# Patient Record
Sex: MALE | Race: BLACK OR AFRICAN AMERICAN | NOT HISPANIC OR LATINO | Employment: OTHER | URBAN - NONMETROPOLITAN AREA
[De-identification: names, ages, dates, MRNs, and addresses within clinical notes are randomized per-mention and may not be internally consistent; named-entity substitution may affect disease eponyms.]

---

## 2018-01-02 ENCOUNTER — APPOINTMENT (OUTPATIENT)
Dept: GENERAL RADIOLOGY | Facility: HOSPITAL | Age: 43
End: 2018-01-02

## 2018-01-02 PROCEDURE — 71046 X-RAY EXAM CHEST 2 VIEWS: CPT

## 2018-06-04 ENCOUNTER — OFFICE VISIT (OUTPATIENT)
Dept: INTERNAL MEDICINE | Facility: CLINIC | Age: 43
End: 2018-06-04

## 2018-06-04 VITALS
WEIGHT: 137.19 LBS | HEART RATE: 56 BPM | OXYGEN SATURATION: 98 % | BODY MASS INDEX: 22.86 KG/M2 | SYSTOLIC BLOOD PRESSURE: 106 MMHG | HEIGHT: 65 IN | TEMPERATURE: 98.5 F | DIASTOLIC BLOOD PRESSURE: 63 MMHG | RESPIRATION RATE: 12 BRPM

## 2018-06-04 DIAGNOSIS — Z00.00 ANNUAL PHYSICAL EXAM: Primary | ICD-10-CM

## 2018-06-04 DIAGNOSIS — R63.8 UNABLE TO MAINTAIN WEIGHT: ICD-10-CM

## 2018-06-04 DIAGNOSIS — R53.83 OTHER FATIGUE: ICD-10-CM

## 2018-06-04 DIAGNOSIS — S46.912A STRAIN OF LEFT SHOULDER, INITIAL ENCOUNTER: ICD-10-CM

## 2018-06-04 DIAGNOSIS — B35.3 TINEA PEDIS OF BOTH FEET: ICD-10-CM

## 2018-06-04 DIAGNOSIS — J45.909 ASTHMA DUE TO ENVIRONMENTAL ALLERGIES: ICD-10-CM

## 2018-06-04 DIAGNOSIS — R45.89 MOODINESS: ICD-10-CM

## 2018-06-04 DIAGNOSIS — F41.9 ANXIETY: ICD-10-CM

## 2018-06-04 DIAGNOSIS — Z23 NEED FOR TDAP VACCINATION: ICD-10-CM

## 2018-06-04 DIAGNOSIS — B35.1 FUNGAL INFECTION OF NAIL: ICD-10-CM

## 2018-06-04 PROBLEM — F12.20 MODERATE TETRAHYDROCANNABINOL (THC) DEPENDENCE (HCC): Status: ACTIVE | Noted: 2018-06-04

## 2018-06-04 PROCEDURE — 90471 IMMUNIZATION ADMIN: CPT | Performed by: NURSE PRACTITIONER

## 2018-06-04 PROCEDURE — 90715 TDAP VACCINE 7 YRS/> IM: CPT | Performed by: NURSE PRACTITIONER

## 2018-06-04 PROCEDURE — 99214 OFFICE O/P EST MOD 30 MIN: CPT | Performed by: NURSE PRACTITIONER

## 2018-06-04 RX ORDER — VENLAFAXINE HYDROCHLORIDE 37.5 MG/1
37.5 CAPSULE, EXTENDED RELEASE ORAL DAILY
Qty: 30 CAPSULE | Refills: 3 | Status: SHIPPED | OUTPATIENT
Start: 2018-06-04 | End: 2018-10-19

## 2018-06-04 RX ORDER — CLOTRIMAZOLE 1 G/ML
SOLUTION TOPICAL EVERY 12 HOURS SCHEDULED
Qty: 30 ML | Refills: 3 | Status: SHIPPED | OUTPATIENT
Start: 2018-06-04 | End: 2018-12-10

## 2018-06-04 RX ORDER — LORATADINE 10 MG/1
10 TABLET ORAL DAILY
Qty: 30 TABLET | Refills: 6 | Status: SHIPPED | OUTPATIENT
Start: 2018-06-04 | End: 2019-06-10

## 2018-06-04 RX ORDER — VENLAFAXINE 50 MG/1
50 TABLET ORAL
Qty: 60 TABLET | Refills: 3 | Status: CANCELLED | OUTPATIENT
Start: 2018-06-04

## 2018-06-04 RX ORDER — MONTELUKAST SODIUM 10 MG/1
10 TABLET ORAL NIGHTLY
Qty: 30 TABLET | Refills: 6 | Status: SHIPPED | OUTPATIENT
Start: 2018-06-04 | End: 2019-06-10 | Stop reason: CLARIF

## 2018-06-04 NOTE — PROGRESS NOTES
CC: left shoulder pain, injury to left should in December.     History:  Jose Pace is a 43 y.o. male who presents today for evaluation of the above problems.    Patient presents today for left shoulder injury.  He states that in December he was weightlifting and felt a pop in the left shoulder chest and back around the shoulder blade.  He did see a chiropractor after the injury.  He reports no imaging was done.  He states back has improved slightly but shoulder remains painful with a ball/firm area over his deltoid muscle.  Range of motion is not limited but tender.  Patient also reports generalized fatigue.  He states that he is an avid weightlifter however he has not been able to gain weight.  He also reports that he has athlete's foot on both feet with yellowing of nails.  Patient also reports that he has issues with anxiety and mood.  He states he has a short fuse and is easy to anger or excite.  No clear of her harming himself or others.  He has tried Wellbutrin in the past however this caused him to be more ford.      ROS:  Review of Systems   Constitutional: Positive for fatigue and unexpected weight change. Negative for activity change, appetite change and fever.   HENT: Negative.    Respiratory: Positive for shortness of breath (after exercise). Negative for cough, chest tightness and wheezing.    Cardiovascular: Negative for chest pain, palpitations and leg swelling.   Gastrointestinal: Negative.    Endocrine: Negative.    Genitourinary: Negative.    Musculoskeletal: Positive for myalgias.   Skin: Positive for rash.   Neurological: Negative for dizziness and headaches.   Psychiatric/Behavioral: Positive for agitation. The patient is nervous/anxious.        No Known Allergies  Past Medical History:   Diagnosis Date   • Asthma      Past Surgical History:   Procedure Laterality Date   • HERNIA REPAIR       No family history on file.   reports that he has never smoked. He has never used smokeless tobacco.  "He reports that he uses drugs, including Marijuana. He reports that he does not drink alcohol.      Current Outpatient Prescriptions:   •  albuterol (PROVENTIL HFA;VENTOLIN HFA) 108 (90 Base) MCG/ACT inhaler, Inhale 2 puffs Every 4 (Four) Hours As Needed for Wheezing., Disp: 1 inhaler, Rfl: 0  •  albuterol (PROVENTIL) (2.5 MG/3ML) 0.083% nebulizer solution, Take 2.5 mg by nebulization Every 4 (Four) Hours As Needed for Wheezing., Disp: 30 vial, Rfl: 0  •  clotrimazole (LOTRIMIN) 1 % external solution, Apply  topically Every 12 (Twelve) Hours., Disp: 30 mL, Rfl: 3  •  loratadine (CLARITIN) 10 MG tablet, Take 1 tablet by mouth Daily., Disp: 30 tablet, Rfl: 6  •  montelukast (SINGULAIR) 10 MG tablet, Take 1 tablet by mouth Every Night., Disp: 30 tablet, Rfl: 6  •  venlafaxine XR (EFFEXOR-XR) 37.5 MG 24 hr capsule, Take 1 capsule by mouth Daily., Disp: 30 capsule, Rfl: 3    OBJECTIVE:  /63 (BP Location: Left arm, Patient Position: Sitting, Cuff Size: Adult)   Pulse 56   Temp 98.5 °F (36.9 °C) (Oral)   Resp 12   Ht 165.1 cm (65\")   Wt 62.2 kg (137 lb 3 oz)   SpO2 98%   BMI 22.83 kg/m²    Physical Exam   Constitutional: He is oriented to person, place, and time. He appears well-developed and well-nourished.   HENT:   Head: Normocephalic and atraumatic.   Eyes: Conjunctivae and EOM are normal. Pupils are equal, round, and reactive to light.   Neck: Normal range of motion. Neck supple.   Cardiovascular: Normal rate, regular rhythm and normal heart sounds.    Pulmonary/Chest: Effort normal and breath sounds normal.   Abdominal: Soft. Bowel sounds are normal.   Musculoskeletal:        Left shoulder: He exhibits tenderness, swelling, deformity (deltoid pain with swelling), pain and spasm.   Neurological: He is alert and oriented to person, place, and time. He has normal reflexes.   Skin: Skin is warm and dry. Rash (athletes foot bilaterally) noted.   Psychiatric: He has a normal mood and affect.   Vitals " reviewed.      Assessment/Plan    Jose was seen today for annual exam.    Diagnoses and all orders for this visit:    Annual physical exam  -     Lipid panel; Future  -     Comprehensive metabolic panel; Future  -     CBC No Differential; Future    Need for Tdap vaccination  -     Tdap Vaccine Greater Than or Equal To 8yo IM    Other fatigue  -     TSH; Future  -     Testosterone; Future  -     Vitamin D 25 hydroxy; Future  -     Vitamin B12; Future    Moodiness  -     Testosterone; Future  -     venlafaxine XR (EFFEXOR-XR) 37.5 MG 24 hr capsule; Take 1 capsule by mouth Daily.    Anxiety  -     venlafaxine XR (EFFEXOR-XR) 37.5 MG 24 hr capsule; Take 1 capsule by mouth Daily.    Strain of left shoulder, initial encounter  -     MRI Shoulder Left Without Contrast; Future    Tinea pedis of both feet  -     clotrimazole (LOTRIMIN) 1 % external solution; Apply  topically Every 12 (Twelve) Hours.    Fungal infection of nail  Will check labs. If lever enzymes normal can do oral terbinafine.     Unable to maintain weight  Labs pending.     Asthma due to environmental allergies  -     loratadine (CLARITIN) 10 MG tablet; Take 1 tablet by mouth Daily.  -     montelukast (SINGULAIR) 10 MG tablet; Take 1 tablet by mouth Every Night.          An After Visit Summary was printed and given to the patient at discharge.  Return in about 6 months (around 12/4/2018) for new patient today was not scheduled as new. May need additional paper work. .         Sanjuanita Canales APRN 6/4/2018

## 2018-06-04 NOTE — PATIENT INSTRUCTIONS
"Other Preventive Recommendations:    · A preventive eye exam performed by an eye specialist is recommended every 1-2 years to screen for glaucoma; cataracts, macular degeneration, and other eye disorders.  · A preventive dental visit is recommended every 6 months.  · Try to get at least 150 minutes of exercise per week or 10,000 steps per day on a pedometer .  · Order or download the FREE \"Exercise & Physical Activity: Your Everyday Guide\" from [employer]. Call 1-655.988.9274 or search [employer] online.  · You need 0926-3839 mg of calcium and 8395-3314 IU of vitamin D per day. It is possible to meet your calcium requirement with diet alone, but a vitamin D supplement is usually necessary to meet this goal.  · When exposed to the sun, use a sunscreen that protects against both UVA and UVB radiation with an SPF of 30 or greater. Reapply every 2 to 3 hours or after sweating, drying off with a towel, or swimming.  · Always wear a seat belt when traveling in a car. Always wear a helmet when riding a bicycle or motorcycle.      Generalized Anxiety Disorder, Adult  Generalized anxiety disorder (ELENI) is a mental health disorder. People with this condition constantly worry about everyday events. Unlike normal anxiety, worry related to ELENI is not triggered by a specific event. These worries also do not fade or get better with time. ELENI interferes with life functions, including relationships, work, and school.  ELENI can vary from mild to severe. People with severe ELENI can have intense waves of anxiety with physical symptoms (panic attacks).  What are the causes?  The exact cause of ELENI is not known.  What increases the risk?  This condition is more likely to develop in:  · Women.  · People who have a family history of anxiety disorders.  · People who are very shy.  · People who experience very stressful life events, such as the death of a loved one.  · People who have a very stressful " family environment.  What are the signs or symptoms?  People with ELENI often worry excessively about many things in their lives, such as their health and family. They may also be overly concerned about:  · Doing well at work.  · Being on time.  · Natural disasters.  · Friendships.  Physical symptoms of ELENI include:  · Fatigue.  · Muscle tension or having muscle twitches.  · Trembling or feeling shaky.  · Being easily startled.  · Feeling like your heart is pounding or racing.  · Feeling out of breath or like you cannot take a deep breath.  · Having trouble falling asleep or staying asleep.  · Sweating.  · Nausea, diarrhea, or irritable bowel syndrome (IBS).  · Headaches.  · Trouble concentrating or remembering facts.  · Restlessness.  · Irritability.  How is this diagnosed?  Your health care provider can diagnose ELENI based on your symptoms and medical history. You will also have a physical exam. The health care provider will ask specific questions about your symptoms, including how severe they are, when they started, and if they come and go. Your health care provider may ask you about your use of alcohol or drugs, including prescription medicines. Your health care provider may refer you to a mental health specialist for further evaluation.  Your health care provider will do a thorough examination and may perform additional tests to rule out other possible causes of your symptoms.  To be diagnosed with ELENI, a person must have anxiety that:  · Is out of his or her control.  · Affects several different aspects of his or her life, such as work and relationships.  · Causes distress that makes him or her unable to take part in normal activities.  · Includes at least three physical symptoms of ELENI, such as restlessness, fatigue, trouble concentrating, irritability, muscle tension, or sleep problems.  Before your health care provider can confirm a diagnosis of ELENI, these symptoms must be present more days than they are not,  and they must last for six months or longer.  How is this treated?  The following therapies are usually used to treat ELENI:  · Medicine. Antidepressant medicine is usually prescribed for long-term daily control. Antianxiety medicines may be added in severe cases, especially when panic attacks occur.  · Talk therapy (psychotherapy). Certain types of talk therapy can be helpful in treating ELENI by providing support, education, and guidance. Options include:  ¨ Cognitive behavioral therapy (CBT). People learn coping skills and techniques to ease their anxiety. They learn to identify unrealistic or negative thoughts and behaviors and to replace them with positive ones.  ¨ Acceptance and commitment therapy (ACT). This treatment teaches people how to be mindful as a way to cope with unwanted thoughts and feelings.  ¨ Biofeedback. This process trains you to manage your body's response (physiological response) through breathing techniques and relaxation methods. You will work with a therapist while machines are used to monitor your physical symptoms.  · Stress management techniques. These include yoga, meditation, and exercise.  A mental health specialist can help determine which treatment is best for you. Some people see improvement with one type of therapy. However, other people require a combination of therapies.  Follow these instructions at home:  · Take over-the-counter and prescription medicines only as told by your health care provider.  · Try to maintain a normal routine.  · Try to anticipate stressful situations and allow extra time to manage them.  · Practice any stress management or self-calming techniques as taught by your health care provider.  · Do not punish yourself for setbacks or for not making progress.  · Try to recognize your accomplishments, even if they are small.  · Keep all follow-up visits as told by your health care provider. This is important.  Contact a health care provider if:  · Your symptoms  do not get better.  · Your symptoms get worse.  · You have signs of depression, such as:  ¨ A persistently sad, cranky, or irritable mood.  ¨ Loss of enjoyment in activities that used to bring you ace.  ¨ Change in weight or eating.  ¨ Changes in sleeping habits.  ¨ Avoiding friends or family members.  ¨ Loss of energy for normal tasks.  ¨ Feelings of guilt or worthlessness.  Get help right away if:  · You have serious thoughts about hurting yourself or others.  If you ever feel like you may hurt yourself or others, or have thoughts about taking your own life, get help right away. You can go to your nearest emergency department or call:  · Your local emergency services (911 in the [place]  · A suicide crisis helpline, such as the National Suicide Prevention Lifeline at 1-922.657.7378. This is open 24 hours a day.  Summary  · Generalized anxiety disorder (ELENI) is a mental health disorder that involves worry that is not triggered by a specific event.  · People with ELENI often worry excessively about many things in their lives, such as their health and family.  · ELENI may cause physical symptoms such as restlessness, trouble concentrating, sleep problems, frequent sweating, nausea, diarrhea, headaches, and trembling or muscle twitching.  · A mental health specialist can help determine which treatment is best for you. Some people see improvement with one type of therapy. However, other people require a combination of therapies.  This information is not intended to replace advice given to you by your health care provider. Make sure you discuss any questions you have with your health care provider.  Document Released: 04/14/2014 Document Revised: 11/07/2017 Document Reviewed: 11/07/2017  [employer] Interactive Patient Education © 2017 [employer]

## 2018-06-05 ENCOUNTER — LAB (OUTPATIENT)
Dept: LAB | Facility: HOSPITAL | Age: 43
End: 2018-06-05

## 2018-06-05 DIAGNOSIS — R53.83 OTHER FATIGUE: ICD-10-CM

## 2018-06-05 DIAGNOSIS — R45.89 MOODINESS: ICD-10-CM

## 2018-06-05 DIAGNOSIS — Z00.00 ANNUAL PHYSICAL EXAM: ICD-10-CM

## 2018-06-05 LAB
25(OH)D3 SERPL-MCNC: 29.7 NG/ML (ref 30–100)
ALBUMIN SERPL-MCNC: 4.3 G/DL (ref 3.5–5)
ALBUMIN/GLOB SERPL: 1.5 G/DL (ref 1.1–2.5)
ALP SERPL-CCNC: 63 U/L (ref 24–120)
ALT SERPL W P-5'-P-CCNC: 35 U/L (ref 0–54)
ANION GAP SERPL CALCULATED.3IONS-SCNC: 9 MMOL/L (ref 4–13)
ARTICHOKE IGE QN: 74 MG/DL (ref 0–99)
AST SERPL-CCNC: 37 U/L (ref 7–45)
BILIRUB SERPL-MCNC: 1.8 MG/DL (ref 0.1–1)
BUN BLD-MCNC: 15 MG/DL (ref 5–21)
BUN/CREAT SERPL: 11 (ref 7–25)
CALCIUM SPEC-SCNC: 9.2 MG/DL (ref 8.4–10.4)
CHLORIDE SERPL-SCNC: 105 MMOL/L (ref 98–110)
CHOLEST SERPL-MCNC: 154 MG/DL (ref 130–200)
CO2 SERPL-SCNC: 30 MMOL/L (ref 24–31)
CREAT BLD-MCNC: 1.36 MG/DL (ref 0.5–1.4)
DEPRECATED RDW RBC AUTO: 37.7 FL (ref 40–54)
ERYTHROCYTE [DISTWIDTH] IN BLOOD BY AUTOMATED COUNT: 11.8 % (ref 12–15)
GFR SERPL CREATININE-BSD FRML MDRD: 70 ML/MIN/1.73
GLOBULIN UR ELPH-MCNC: 2.9 GM/DL
GLUCOSE BLD-MCNC: 101 MG/DL (ref 70–100)
HCT VFR BLD AUTO: 44.9 % (ref 40–52)
HDLC SERPL-MCNC: 60 MG/DL
HGB BLD-MCNC: 14.6 G/DL (ref 14–18)
LDLC/HDLC SERPL: 1.37 {RATIO}
MCH RBC QN AUTO: 28.6 PG (ref 28–32)
MCHC RBC AUTO-ENTMCNC: 32.5 G/DL (ref 33–36)
MCV RBC AUTO: 87.9 FL (ref 82–95)
PLATELET # BLD AUTO: 197 10*3/MM3 (ref 130–400)
PMV BLD AUTO: 10.9 FL (ref 6–12)
POTASSIUM BLD-SCNC: 4.2 MMOL/L (ref 3.5–5.3)
PROT SERPL-MCNC: 7.2 G/DL (ref 6.3–8.7)
RBC # BLD AUTO: 5.11 10*6/MM3 (ref 4.8–5.9)
SODIUM BLD-SCNC: 144 MMOL/L (ref 135–145)
TRIGL SERPL-MCNC: 60 MG/DL (ref 0–149)
TSH SERPL DL<=0.05 MIU/L-ACNC: 1.48 MIU/ML (ref 0.47–4.68)
VIT B12 BLD-MCNC: 595 PG/ML (ref 239–931)
WBC NRBC COR # BLD: 8.45 10*3/MM3 (ref 4.8–10.8)

## 2018-06-05 PROCEDURE — 82306 VITAMIN D 25 HYDROXY: CPT | Performed by: NURSE PRACTITIONER

## 2018-06-05 PROCEDURE — 36415 COLL VENOUS BLD VENIPUNCTURE: CPT

## 2018-06-05 PROCEDURE — 82607 VITAMIN B-12: CPT | Performed by: NURSE PRACTITIONER

## 2018-06-05 PROCEDURE — 84403 ASSAY OF TOTAL TESTOSTERONE: CPT | Performed by: NURSE PRACTITIONER

## 2018-06-05 PROCEDURE — 80061 LIPID PANEL: CPT | Performed by: NURSE PRACTITIONER

## 2018-06-05 PROCEDURE — 80050 GENERAL HEALTH PANEL: CPT | Performed by: NURSE PRACTITIONER

## 2018-06-06 LAB — TESTOST SERPL-MCNC: 547 NG/DL (ref 264–916)

## 2018-06-07 ENCOUNTER — TELEPHONE (OUTPATIENT)
Dept: INTERNAL MEDICINE | Facility: CLINIC | Age: 43
End: 2018-06-07

## 2018-06-07 NOTE — TELEPHONE ENCOUNTER
Patient informed.  Patient asked with his thyroid and testosterone levels being normal, are there any other possible causes for his inability to gain weight?  Patient asked if there are any suggestions for weight gain.

## 2018-06-07 NOTE — PATIENT INSTRUCTIONS
High-Protein and High-Calorie Diet  Eating high-protein and high-calorie foods can help you to gain weight, heal after an injury, and recover after an illness or surgery.  What is my plan?  The specific amount of daily protein and calories you need depends on:  · Your body weight.  · The reason this diet is recommended for you.  Generally, a high-protein, high-calorie diet involves:  · Eating 250-500 extra calories each day.  · Making sure that 10-35% of your daily calories come from protein.  Talk to your health care provider about how much protein and how many calories you need each day. Follow the diet as directed by your health care provider.  What do I need to know about this diet?  · Ask your health care provider if you should take a nutritional supplement.  · Try to eat six small meals each day instead of three large meals.  · Eat a balanced diet, including one food that is high in protein at each meal.  · Keep nutritious snacks handy, such as nuts, trail mixes, dried fruit, and yogurt.  · If you have kidney disease or diabetes, eating too much protein may put extra stress on your kidneys. Talk to your health care provider if you have either of those conditions.  What are some high-protein foods?  Grains   Quinoa. Bulgur wheat.  Vegetables   Soybeans. Peas.  Meats and Other Protein Sources   Beef, pork, and poultry. Fish and seafood. Eggs. Tofu. Textured vegetable protein (TVP). Peanut butter. Nuts and seeds. Dried beans. Protein powders.  Dairy   Whole milk. Whole-milk yogurt. Powdered milk. Cheese. Cottage Cheese. Eggnog.  Beverages   High-protein supplement drinks. Soy milk.  Other   Protein bars.  The items listed above may not be a complete list of recommended foods or beverages. Contact your dietitian for more options.   What are some high-calorie foods?  Grains   Pasta. Quick breads. Muffins. Pancakes. Ready-to-eat cereal.  Vegetables   Vegetables cooked in oil or butter. Fried potatoes.  Fruits    Dried fruit. Fruit leather. Canned fruit in syrup. Fruit juice. Avocados.  Meats and Other Protein Sources   Peanut butter. Nuts and seeds.  Dairy   Heavy cream. Whipped cream. Cream cheese. Sour cream. Ice cream. Custard. Pudding.  Beverages   Meal-replacement beverages. Nutrition shakes. Fruit juice. Sugar-sweetened soft drinks.  Condiments   Salad dressing. Mayonnaise. Ozzy sauce. Fruit preserves or jelly. Honey. Syrup.  Sweets/Desserts   Cake. Cookies. Pie. Pastries. Candy bars. Chocolate.  Fats and Oils   Butter or margarine. Oil. Gravy.  Other   Meal-replacement bars.  The items listed above may not be a complete list of recommended foods or beverages. Contact your dietitian for more options.   What are some tips for including high-protein and high-calorie foods in my diet?  · Add whole milk, half-and-half, or heavy cream to cereal, pudding, soup, or hot cocoa.  · Add whole milk to instant breakfast drinks.  · Add peanut butter to oatmeal or smoothies.  · Add powdered milk to baked goods, smoothies, or milkshakes.  · Add powdered milk, cream, or butter to mashed potatoes.  · Add cheese to cooked vegetables.  · Make whole-milk yogurt parfaits. Top them with granola, fruit, or nuts.  · Add cottage cheese to your fruit.  · Add avocados, cheese, or both to sandwiches or salads.  · Add meat, poultry, or seafood to rice, pasta, casseroles, salads, and soups.  · Use mayonnaise when making egg salad, chicken salad, or tuna salad.  · Use peanut butter as a topping for pretzels, celery, or crackers.  · Add beans to casseroles, dips, and spreads.  · Add pureed beans to sauces and soups.  · Replace calorie-free drinks with calorie-containing drinks, such as milk and fruit juice.  This information is not intended to replace advice given to you by your health care provider. Make sure you discuss any questions you have with your health care provider.  Document Released: 12/18/2006 Document Revised: 05/25/2017 Document  Reviewed: 06/02/2015  [employer] Interactive Patient Education © 2017 [employer]

## 2018-06-07 NOTE — TELEPHONE ENCOUNTER
Diet that increases weight is one way to gain weight. He is considered a health weight at this time.    High-Protein and High-Calorie Diet  Eating high-protein and high-calorie foods can help you to gain weight, heal after an injury, and recover after an illness or surgery.  What is my plan?  The specific amount of daily protein and calories you need depends on:  Your body weight.  The reason this diet is recommended for you.  Generally, a high-protein, high-calorie diet involves:  Eating 250–500 extra calories each day.  Making sure that 10–35% of your daily calories come from protein.  Talk to your health care provider about how much protein and how many calories you need each day. Follow the diet as directed by your health care provider.  What do I need to know about this diet?  Ask your health care provider if you should take a nutritional supplement.  Try to eat six small meals each day instead of three large meals.  Eat a balanced diet, including one food that is high in protein at each meal.  Keep nutritious snacks handy, such as nuts, trail mixes, dried fruit, and yogurt.  If you have kidney disease or diabetes, eating too much protein may put extra stress on your kidneys. Talk to your health care provider if you have either of those conditions.  What are some high-protein foods?  Grains   Quinoa. Bulgur wheat.  Vegetables   Soybeans. Peas.  Meats and Other Protein Sources   Beef, pork, and poultry. Fish and seafood. Eggs. Tofu. Textured vegetable protein (TVP). Peanut butter. Nuts and seeds. Dried beans. Protein powders.  Dairy   Whole milk. Whole-milk yogurt. Powdered milk. Cheese. Cottage Cheese. Eggnog.  Beverages   High-protein supplement drinks. Soy milk.  Other   Protein bars.  The items listed above may not be a complete list of recommended foods or beverages. Contact your dietitian for more options.   What are some high-calorie foods?  Grains   Pasta. Quick breads. Muffins. Pancakes. Ready-to-eat  cereal.  Vegetables   Vegetables cooked in oil or butter. Fried potatoes.  Fruits   Dried fruit. Fruit leather. Canned fruit in syrup. Fruit juice. Avocados.  Meats and Other Protein Sources   Peanut butter. Nuts and seeds.  Dairy   Heavy cream. Whipped cream. Cream cheese. Sour cream. Ice cream. Custard. Pudding.  Beverages   Meal-replacement beverages. Nutrition shakes. Fruit juice. Sugar-sweetened soft drinks.  Condiments   Salad dressing. Mayonnaise. Ozzy sauce. Fruit preserves or jelly. Honey. Syrup.  Sweets/Desserts   Cake. Cookies. Pie. Pastries. Candy bars. Chocolate.  Fats and Oils   Butter or margarine. Oil. Gravy.  Other   Meal-replacement bars.  The items listed above may not be a complete list of recommended foods or beverages. Contact your dietitian for more options.   What are some tips for including high-protein and high-calorie foods in my diet?  Add whole milk, half-and-half, or heavy cream to cereal, pudding, soup, or hot cocoa.  Add whole milk to instant breakfast drinks.  Add peanut butter to oatmeal or smoothies.  Add powdered milk to baked goods, smoothies, or milkshakes.  Add powdered milk, cream, or butter to mashed potatoes.  Add cheese to cooked vegetables.  Make whole-milk yogurt parfaits. Top them with granola, fruit, or nuts.  Add cottage cheese to your fruit.  Add avocados, cheese, or both to sandwiches or salads.  Add meat, poultry, or seafood to rice, pasta, casseroles, salads, and soups.  Use mayonnaise when making egg salad, chicken salad, or tuna salad.  Use peanut butter as a topping for pretzels, celery, or crackers.  Add beans to casseroles, dips, and spreads.  Add pureed beans to sauces and soups.  Replace calorie-free drinks with calorie-containing drinks, such as milk and fruit juice.  This information is not intended to replace advice given to you by your health care provider. Make sure you discuss any questions you have with your health care provider.  Document  Released: 12/18/2006 Document Revised: 05/25/2017 Document Reviewed: 06/02/2015  [employer] Interactive Patient Education © 2017 [employer]

## 2018-06-07 NOTE — TELEPHONE ENCOUNTER
----- Message from ABIMBOLA Escalera sent at 6/7/2018 11:04 AM CDT -----  Please let patient know that his labs are back. He has very slightly low vitamin D. An OTC multivitamin with vitamin D would be best to supplement this at this time. Otherwise labs are normal.

## 2018-06-08 NOTE — TELEPHONE ENCOUNTER
Patient advised he is considered a healthy weight at this time, and was informed the information regarding diet was printed and left at the  for him to  at his convenience if he's interested in doing so.

## 2018-06-11 DIAGNOSIS — J20.9 ACUTE BRONCHITIS, UNSPECIFIED ORGANISM: ICD-10-CM

## 2018-06-11 RX ORDER — ALBUTEROL SULFATE 2.5 MG/3ML
2.5 SOLUTION RESPIRATORY (INHALATION) EVERY 4 HOURS PRN
Qty: 30 VIAL | Refills: 0 | Status: SHIPPED | OUTPATIENT
Start: 2018-06-11 | End: 2018-11-05 | Stop reason: SDUPTHER

## 2018-06-26 ENCOUNTER — TELEPHONE (OUTPATIENT)
Dept: INTERNAL MEDICINE | Facility: CLINIC | Age: 43
End: 2018-06-26

## 2018-06-26 NOTE — TELEPHONE ENCOUNTER
Patient is still having issues with his shoulder. He said that he has noticed more knots throughout the muscles, along with pain and discomfort.    He is also experiencing side effects from the anxiety medication: night sweats, nausea, loss of appetite, fatigue, sleepy, and has recently noticed that his urination flow has slowed tremendously.

## 2018-06-26 NOTE — TELEPHONE ENCOUNTER
MRI was denied on 6/11/18.  I told him about the denial.  I explained to him he needs to try conservative measures before his MRI was approved.  I suggested for him to do physical therapy.  He didn't want to do that at the time, but I explained to him that it would be necessary to obtain an MRI approval.  He said that he would think about the therapy and let me know what he decided.

## 2018-07-17 ENCOUNTER — TELEPHONE (OUTPATIENT)
Dept: INTERNAL MEDICINE | Facility: CLINIC | Age: 43
End: 2018-07-17

## 2018-07-17 ENCOUNTER — LAB (OUTPATIENT)
Dept: LAB | Facility: HOSPITAL | Age: 43
End: 2018-07-17
Attending: INTERNAL MEDICINE

## 2018-07-17 DIAGNOSIS — Z00.00 ENCOUNTER FOR PREVENTIVE HEALTH EXAMINATION: ICD-10-CM

## 2018-07-17 DIAGNOSIS — Z00.00 ENCOUNTER FOR PREVENTIVE HEALTH EXAMINATION: Primary | ICD-10-CM

## 2018-07-17 LAB — HIV1+2 AB SER QL: NEGATIVE

## 2018-07-17 PROCEDURE — 36415 COLL VENOUS BLD VENIPUNCTURE: CPT

## 2018-07-17 PROCEDURE — 87591 N.GONORRHOEAE DNA AMP PROB: CPT | Performed by: INTERNAL MEDICINE

## 2018-07-17 PROCEDURE — 86592 SYPHILIS TEST NON-TREP QUAL: CPT | Performed by: INTERNAL MEDICINE

## 2018-07-17 PROCEDURE — G0432 EIA HIV-1/HIV-2 SCREEN: HCPCS | Performed by: INTERNAL MEDICINE

## 2018-07-17 PROCEDURE — 87491 CHLMYD TRACH DNA AMP PROBE: CPT | Performed by: INTERNAL MEDICINE

## 2018-07-17 NOTE — TELEPHONE ENCOUNTER
I tried calling to inform patient orders have been placed for test and he can go to the lab at his convenience.  There was no answer and the voice mailbox was full so I could not leave a message.

## 2018-07-19 LAB
C TRACH RRNA SPEC DONR QL NAA+PROBE: NEGATIVE
N GONORRHOEA DNA SPEC QL NAA+PROBE: NEGATIVE
RPR SER QL: NON REACTIVE

## 2018-07-20 ENCOUNTER — TELEPHONE (OUTPATIENT)
Dept: INTERNAL MEDICINE | Facility: CLINIC | Age: 43
End: 2018-07-20

## 2018-07-20 NOTE — TELEPHONE ENCOUNTER
----- Message from Reji Hardy DO sent at 7/19/2018  9:37 PM CDT -----  Testing for HIV, syphillis, chlamydia and gonorrhea were negative. We will likely plan on rechecking in December, but this is great news. Please notify if there are any new symptoms before then.      I called and spoke with the patient, informed him of results and the need to f/u for repeat in December.   MM

## 2018-07-31 DIAGNOSIS — J20.9 ACUTE BRONCHITIS, UNSPECIFIED ORGANISM: ICD-10-CM

## 2018-07-31 RX ORDER — ALBUTEROL SULFATE 90 UG/1
2 AEROSOL, METERED RESPIRATORY (INHALATION) EVERY 4 HOURS PRN
Qty: 1 INHALER | Refills: 3 | Status: SHIPPED | OUTPATIENT
Start: 2018-07-31 | End: 2018-11-05 | Stop reason: SDUPTHER

## 2018-10-13 PROCEDURE — 99284 EMERGENCY DEPT VISIT MOD MDM: CPT

## 2018-10-14 ENCOUNTER — APPOINTMENT (OUTPATIENT)
Dept: GENERAL RADIOLOGY | Facility: HOSPITAL | Age: 43
End: 2018-10-14

## 2018-10-14 ENCOUNTER — HOSPITAL ENCOUNTER (EMERGENCY)
Facility: HOSPITAL | Age: 43
Discharge: HOME | End: 2018-10-14
Attending: EMERGENCY MEDICINE | Admitting: EMERGENCY MEDICINE

## 2018-10-14 ENCOUNTER — APPOINTMENT (OUTPATIENT)
Dept: CT IMAGING | Facility: HOSPITAL | Age: 43
End: 2018-10-14

## 2018-10-14 VITALS
HEART RATE: 50 BPM | TEMPERATURE: 98 F | RESPIRATION RATE: 14 BRPM | DIASTOLIC BLOOD PRESSURE: 68 MMHG | SYSTOLIC BLOOD PRESSURE: 114 MMHG | HEIGHT: 65 IN | OXYGEN SATURATION: 98 % | BODY MASS INDEX: 24.16 KG/M2 | WEIGHT: 145 LBS

## 2018-10-14 VITALS
DIASTOLIC BLOOD PRESSURE: 73 MMHG | WEIGHT: 144 LBS | BODY MASS INDEX: 23.99 KG/M2 | SYSTOLIC BLOOD PRESSURE: 136 MMHG | TEMPERATURE: 97.9 F | HEIGHT: 65 IN | OXYGEN SATURATION: 100 % | RESPIRATION RATE: 14 BRPM | HEART RATE: 48 BPM

## 2018-10-14 DIAGNOSIS — S16.1XXA STRAIN OF NECK MUSCLE, INITIAL ENCOUNTER: ICD-10-CM

## 2018-10-14 DIAGNOSIS — S06.0X0D CONCUSSION WITHOUT LOSS OF CONSCIOUSNESS, SUBSEQUENT ENCOUNTER: Primary | ICD-10-CM

## 2018-10-14 DIAGNOSIS — S61.422A LACERATION OF LEFT HAND WITH FOREIGN BODY, INITIAL ENCOUNTER: Primary | ICD-10-CM

## 2018-10-14 DIAGNOSIS — S20.211A CHEST WALL CONTUSION, RIGHT, INITIAL ENCOUNTER: ICD-10-CM
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
## 2018-10-14 DIAGNOSIS — S09.90XA CLOSED HEAD INJURY, INITIAL ENCOUNTER: ICD-10-CM

## 2018-10-14 LAB
ALBUMIN SERPL-MCNC: 4.2 G/DL (ref 3.5–5)
ALBUMIN/GLOB SERPL: 1.4 G/DL (ref 1.1–2.5)
ALP SERPL-CCNC: 64 U/L (ref 24–120)
ALT SERPL W P-5'-P-CCNC: 41 U/L (ref 0–54)
ANION GAP SERPL CALCULATED.3IONS-SCNC: 10 MMOL/L (ref 4–13)
APTT PPP: 29.9 SECONDS (ref 24.1–34.8)
AST SERPL-CCNC: 47 U/L (ref 7–45)
BASOPHILS # BLD AUTO: 0.09 10*3/MM3 (ref 0–0.2)
BASOPHILS NFR BLD AUTO: 0.9 % (ref 0–2)
BILIRUB SERPL-MCNC: 0.9 MG/DL (ref 0.1–1)
BUN BLD-MCNC: 22 MG/DL (ref 5–21)
BUN/CREAT SERPL: 17.3 (ref 7–25)
CALCIUM SPEC-SCNC: 9.2 MG/DL (ref 8.4–10.4)
CHLORIDE SERPL-SCNC: 105 MMOL/L (ref 98–110)
CK SERPL-CCNC: 238 U/L (ref 0–203)
CO2 SERPL-SCNC: 25 MMOL/L (ref 24–31)
CREAT BLD-MCNC: 1.27 MG/DL (ref 0.5–1.4)
CREAT BLDA-MCNC: 1.2 MG/DL (ref 0.6–1.3)
DEPRECATED RDW RBC AUTO: 35.5 FL (ref 40–54)
EOSINOPHIL # BLD AUTO: 0.98 10*3/MM3 (ref 0–0.7)
EOSINOPHIL NFR BLD AUTO: 10 % (ref 0–4)
ERYTHROCYTE [DISTWIDTH] IN BLOOD BY AUTOMATED COUNT: 11.4 % (ref 12–15)
ETHANOL UR QL: <0.01 %
GFR SERPL CREATININE-BSD FRML MDRD: 76 ML/MIN/1.73
GLOBULIN UR ELPH-MCNC: 2.9 GM/DL
GLUCOSE BLD-MCNC: 97 MG/DL (ref 70–100)
HCT VFR BLD AUTO: 42.8 % (ref 40–52)
HGB BLD-MCNC: 14.2 G/DL (ref 14–18)
IMM GRANULOCYTES # BLD: 0.03 10*3/MM3 (ref 0–0.03)
IMM GRANULOCYTES NFR BLD: 0.3 % (ref 0–5)
INR PPP: 1.06 (ref 0.91–1.09)
LIPASE SERPL-CCNC: 76 U/L (ref 23–203)
LYMPHOCYTES # BLD AUTO: 2.96 10*3/MM3 (ref 0.72–4.86)
LYMPHOCYTES NFR BLD AUTO: 30.1 % (ref 15–45)
MCH RBC QN AUTO: 28.5 PG (ref 28–32)
MCHC RBC AUTO-ENTMCNC: 33.2 G/DL (ref 33–36)
MCV RBC AUTO: 85.9 FL (ref 82–95)
MONOCYTES # BLD AUTO: 0.74 10*3/MM3 (ref 0.19–1.3)
MONOCYTES NFR BLD AUTO: 7.5 % (ref 4–12)
MYOGLOBIN SERPL-MCNC: 40.6 NG/ML (ref 0–110)
NEUTROPHILS # BLD AUTO: 5.03 10*3/MM3 (ref 1.87–8.4)
NEUTROPHILS NFR BLD AUTO: 51.2 % (ref 39–78)
NRBC BLD MANUAL-RTO: 0 /100 WBC (ref 0–0)
PLATELET # BLD AUTO: 217 10*3/MM3 (ref 130–400)
PMV BLD AUTO: 10.5 FL (ref 6–12)
POTASSIUM BLD-SCNC: 3.9 MMOL/L (ref 3.5–5.3)
PROT SERPL-MCNC: 7.1 G/DL (ref 6.3–8.7)
PROTHROMBIN TIME: 14.1 SECONDS (ref 11.9–14.6)
RBC # BLD AUTO: 4.98 10*6/MM3 (ref 4.8–5.9)
SODIUM BLD-SCNC: 140 MMOL/L (ref 135–145)
TROPONIN I SERPL-MCNC: <0.012 NG/ML (ref 0–0.03)
WBC NRBC COR # BLD: 9.83 10*3/MM3 (ref 4.8–10.8)

## 2018-10-14 PROCEDURE — 25010000002 IOPAMIDOL 61 % SOLUTION: Performed by: EMERGENCY MEDICINE

## 2018-10-14 PROCEDURE — 80307 DRUG TEST PRSMV CHEM ANLYZR: CPT | Performed by: EMERGENCY MEDICINE

## 2018-10-14 PROCEDURE — 96374 THER/PROPH/DIAG INJ IV PUSH: CPT

## 2018-10-14 PROCEDURE — 25010000002 ONDANSETRON PER 1 MG: Performed by: EMERGENCY MEDICINE

## 2018-10-14 PROCEDURE — 96376 TX/PRO/DX INJ SAME DRUG ADON: CPT

## 2018-10-14 PROCEDURE — 70450 CT HEAD/BRAIN W/O DYE: CPT

## 2018-10-14 PROCEDURE — 99283 EMERGENCY DEPT VISIT LOW MDM: CPT

## 2018-10-14 PROCEDURE — 85610 PROTHROMBIN TIME: CPT | Performed by: EMERGENCY MEDICINE

## 2018-10-14 PROCEDURE — 83874 ASSAY OF MYOGLOBIN: CPT | Performed by: EMERGENCY MEDICINE

## 2018-10-14 PROCEDURE — 73030 X-RAY EXAM OF SHOULDER: CPT

## 2018-10-14 PROCEDURE — 85025 COMPLETE CBC W/AUTO DIFF WBC: CPT | Performed by: EMERGENCY MEDICINE

## 2018-10-14 PROCEDURE — 93010 ELECTROCARDIOGRAM REPORT: CPT | Performed by: INTERNAL MEDICINE

## 2018-10-14 PROCEDURE — 73130 X-RAY EXAM OF HAND: CPT

## 2018-10-14 PROCEDURE — 80053 COMPREHEN METABOLIC PANEL: CPT | Performed by: EMERGENCY MEDICINE

## 2018-10-14 PROCEDURE — 85730 THROMBOPLASTIN TIME PARTIAL: CPT | Performed by: EMERGENCY MEDICINE

## 2018-10-14 PROCEDURE — 82550 ASSAY OF CK (CPK): CPT | Performed by: EMERGENCY MEDICINE

## 2018-10-14 PROCEDURE — 93005 ELECTROCARDIOGRAM TRACING: CPT | Performed by: EMERGENCY MEDICINE

## 2018-10-14 PROCEDURE — 71260 CT THORAX DX C+: CPT

## 2018-10-14 PROCEDURE — 84484 ASSAY OF TROPONIN QUANT: CPT | Performed by: EMERGENCY MEDICINE

## 2018-10-14 PROCEDURE — 82565 ASSAY OF CREATININE: CPT

## 2018-10-14 PROCEDURE — 72125 CT NECK SPINE W/O DYE: CPT

## 2018-10-14 PROCEDURE — 83690 ASSAY OF LIPASE: CPT | Performed by: EMERGENCY MEDICINE

## 2018-10-14 PROCEDURE — 74177 CT ABD & PELVIS W/CONTRAST: CPT

## 2018-10-14 PROCEDURE — 25010000003 MORPHINE 5 MG/ML SOLUTION: Performed by: EMERGENCY MEDICINE

## 2018-10-14 PROCEDURE — 71045 X-RAY EXAM CHEST 1 VIEW: CPT

## 2018-10-14 PROCEDURE — 96361 HYDRATE IV INFUSION ADD-ON: CPT

## 2018-10-14 RX ORDER — ONDANSETRON 2 MG/ML
4 INJECTION INTRAMUSCULAR; INTRAVENOUS ONCE
Status: DISCONTINUED | OUTPATIENT
Start: 2018-10-14 | End: 2018-10-14

## 2018-10-14 RX ORDER — SODIUM CHLORIDE 0.9 % (FLUSH) 0.9 %
10 SYRINGE (ML) INJECTION AS NEEDED
Status: DISCONTINUED | OUTPATIENT
Start: 2018-10-14 | End: 2018-10-14 | Stop reason: HOSPADM

## 2018-10-14 RX ORDER — ONDANSETRON 4 MG/1
4 TABLET, ORALLY DISINTEGRATING ORAL EVERY 8 HOURS PRN
Qty: 12 TABLET | Refills: 0 | Status: SHIPPED | OUTPATIENT
Start: 2018-10-14 | End: 2019-07-08

## 2018-10-14 RX ORDER — ONDANSETRON 2 MG/ML
4 INJECTION INTRAMUSCULAR; INTRAVENOUS ONCE
Status: COMPLETED | OUTPATIENT
Start: 2018-10-14 | End: 2018-10-14

## 2018-10-14 RX ORDER — DIAPER,BRIEF,INFANT-TODD,DISP
EACH MISCELLANEOUS EVERY 12 HOURS SCHEDULED
Status: DISCONTINUED | OUTPATIENT
Start: 2018-10-14 | End: 2018-10-14 | Stop reason: HOSPADM

## 2018-10-14 RX ORDER — LIDOCAINE HYDROCHLORIDE 10 MG/ML
10 INJECTION, SOLUTION INFILTRATION; PERINEURAL ONCE
Status: COMPLETED | OUTPATIENT
Start: 2018-10-14 | End: 2018-10-14

## 2018-10-14 RX ORDER — NAPROXEN 500 MG/1
500 TABLET ORAL 2 TIMES DAILY PRN
Qty: 20 TABLET | Refills: 0 | Status: SHIPPED | OUTPATIENT
Start: 2018-10-14 | End: 2018-10-19 | Stop reason: ALTCHOICE

## 2018-10-14 RX ORDER — HYDROCODONE BITARTRATE AND ACETAMINOPHEN 5; 325 MG/1; MG/1
1 TABLET ORAL ONCE
Status: COMPLETED | OUTPATIENT
Start: 2018-10-14 | End: 2018-10-14

## 2018-10-14 RX ORDER — MORPHINE SULFATE 5 MG/ML
4 INJECTION, SOLUTION INTRAMUSCULAR; INTRAVENOUS ONCE
Status: COMPLETED | OUTPATIENT
Start: 2018-10-14 | End: 2018-10-14

## 2018-10-14 RX ORDER — CEPHALEXIN 500 MG/1
500 CAPSULE ORAL 3 TIMES DAILY
Qty: 15 CAPSULE | Refills: 0 | Status: SHIPPED | OUTPATIENT
Start: 2018-10-14 | End: 2018-10-19

## 2018-10-14 RX ADMIN — ONDANSETRON 4 MG: 2 INJECTION INTRAMUSCULAR; INTRAVENOUS at 18:08

## 2018-10-14 RX ADMIN — LIDOCAINE HYDROCHLORIDE 10 ML: 10 INJECTION, SOLUTION INFILTRATION; PERINEURAL at 03:07

## 2018-10-14 RX ADMIN — ONDANSETRON 4 MG: 2 INJECTION INTRAMUSCULAR; INTRAVENOUS at 19:50

## 2018-10-14 RX ADMIN — MORPHINE SULFATE 4 MG: 5 INJECTION, SOLUTION INTRAMUSCULAR; INTRAVENOUS at 01:12

## 2018-10-14 RX ADMIN — BACITRACIN: 500 OINTMENT TOPICAL at 03:54

## 2018-10-14 RX ADMIN — IOPAMIDOL 100 ML: 612 INJECTION, SOLUTION INTRAVENOUS at 02:05

## 2018-10-14 RX ADMIN — HYDROCODONE BITARTRATE AND ACETAMINOPHEN 1 TABLET: 5; 325 TABLET ORAL at 18:08

## 2018-10-14 RX ADMIN — MORPHINE SULFATE 4 MG: 5 INJECTION, SOLUTION INTRAMUSCULAR; INTRAVENOUS at 02:45

## 2018-10-14 RX ADMIN — SODIUM CHLORIDE 1000 ML: 9 INJECTION, SOLUTION INTRAVENOUS at 18:08
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
## 2018-10-14 NOTE — DISCHARGE INSTRUCTIONS
Please read and follow all directions.  Tylenol or ibuprofen for discomfort as needed.  Start antibiotics and take to prevent wound infection.  Take all home medications as previously prescribed.  Please contact your primary care provider in 2-3 days to arrange for outpatient follow-up.  If you do not have one you may use the list below.  You will also need follow up in about 7-10 days for suture removal.  Return to the emergency department sooner if symptoms worsen, signs of infection in your hand, or for any additional concerns.        Follow up with one of the [facility] physician groups below to setup primary care. If you have trouble following up, please call the [facility] Nurse Line at [phone]    (Dr. Reji Hardy DO,  ABIMBOLA Escalera, and ABIMBOLA Sheth)  [facility], Primary Care   [address] [place] [address], [address] [address] [phone]     (Dr. Lexi Powers MD, ABIMBOLA Tsai, and ABIMBOLA Beebe)  [facility], Primary Care   [address] [place] [address], [place] [address] [phone]    (Dr. Floyd Degroot MD and Dr. George Hwang MD)  [facility], Primary Care  [address], [address]  [phone]    (Dr. Joey Naylor MD)  [facility], Primary Care  [address] [facility], [facility] [place], [address] [phone]

## 2018-10-19 ENCOUNTER — OFFICE VISIT (OUTPATIENT)
Dept: INTERNAL MEDICINE | Facility: CLINIC | Age: 43
End: 2018-10-19

## 2018-10-19 ENCOUNTER — HOSPITAL ENCOUNTER (OUTPATIENT)
Dept: GENERAL RADIOLOGY | Facility: HOSPITAL | Age: 43
Discharge: HOME | End: 2018-10-19

## 2018-10-19 ENCOUNTER — HOSPITAL ENCOUNTER (OUTPATIENT)
Dept: GENERAL RADIOLOGY | Facility: HOSPITAL | Age: 43
Discharge: HOME | End: 2018-10-19
Admitting: NURSE PRACTITIONER

## 2018-10-19 ENCOUNTER — TELEPHONE (OUTPATIENT)
Dept: INTERNAL MEDICINE | Facility: CLINIC | Age: 43
End: 2018-10-19

## 2018-10-19 VITALS
DIASTOLIC BLOOD PRESSURE: 88 MMHG | SYSTOLIC BLOOD PRESSURE: 134 MMHG | RESPIRATION RATE: 12 BRPM | OXYGEN SATURATION: 98 % | HEIGHT: 65 IN | BODY MASS INDEX: 23.25 KG/M2 | WEIGHT: 139.56 LBS | HEART RATE: 54 BPM

## 2018-10-19 DIAGNOSIS — F41.9 ANXIETY: ICD-10-CM
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
## 2018-10-19 PROCEDURE — 70260 X-RAY EXAM OF SKULL: CPT

## 2018-10-19 PROCEDURE — 99214 OFFICE O/P EST MOD 30 MIN: CPT | Performed by: NURSE PRACTITIONER

## 2018-10-19 PROCEDURE — 73630 X-RAY EXAM OF FOOT: CPT

## 2018-10-19 RX ORDER — DICLOFENAC SODIUM 75 MG/1
75 TABLET, DELAYED RELEASE ORAL 2 TIMES DAILY
Qty: 60 TABLET | Refills: 1 | Status: SHIPPED | OUTPATIENT
Start: 2018-10-19 | End: 2019-06-10

## 2018-10-19 RX ORDER — CYCLOBENZAPRINE HCL 10 MG
10 TABLET ORAL 3 TIMES DAILY PRN
Qty: 90 TABLET | Refills: 0 | Status: SHIPPED | OUTPATIENT
Start: 2018-10-19 | End: 2020-02-27 | Stop reason: ALTCHOICE
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
## 2018-10-19 NOTE — TELEPHONE ENCOUNTER
Please advise that his xrays were all fine.  Again, remind him that he should not drive as long as he is experiencing any dizziness or visual changes.

## 2018-10-22 NOTE — TELEPHONE ENCOUNTER
Patient informed.  Patient asked if it is okay with the provider that he sees a Chiropractor.  He already had someone in mind he wanted to see.    Patient informed he may go to a Chiropractor if he chooses to and thinks it will be beneficial, per Reyna.

## 2018-10-22 NOTE — TELEPHONE ENCOUNTER
Patient informed of xray results and advised not to drive when experiencing dizziness or visual changes.  Patient asked if the area of tenderness on his head is any cause for concern.

## 2018-10-22 NOTE — ED NOTES
"ED Call Back Questions    1. How are you doing since leaving the Emergency Department?    Doing well, already saw cristine arnold er visit  2. Do you have any questions about your discharge instructions? No     3. Have you filled your new prescriptions yet? Yes   a. Do you have any questions about those medications? No     4. Were you able to make a follow-up appointment with the physician? Yes     5. Do you have a primary care physician? Yes   a. If No, would you like for me to set you up with one? N/A  i. If Yes, “I will have our ED  give you a call right back at this number to work with you on the best time for an appointment.”    6. We are always looking to get better at what we do. Do you have any suggestions for what we can do to be even better? No   a. If Yes, \"Thank you for sharing your concerns. I apologize. I will follow up with our manager and patient . Would you like someone to call you back?\" N/A    7. Is there anything else I can do for you? No     "

## 2018-10-25 ENCOUNTER — TELEPHONE (OUTPATIENT)
Dept: INTERNAL MEDICINE | Facility: CLINIC | Age: 43
End: 2018-10-25

## 2018-10-25 DIAGNOSIS — M54.2 CERVICALGIA: Primary | ICD-10-CM

## 2018-10-25 DIAGNOSIS — M25.511 CHRONIC RIGHT SHOULDER PAIN: ICD-10-CM

## 2018-10-25 DIAGNOSIS — G89.29 CHRONIC RIGHT SHOULDER PAIN: ICD-10-CM

## 2018-10-25 NOTE — TELEPHONE ENCOUNTER
Patient stopped by the office and is still having issues with his neck and right shoulder. He said that he keeps being told that there is nothing showing up in xrays, etc., but he is in pain. He is not requesting pain medication, but wanting to know if he should maybe see a chiropractor or have scope surgery done to see what is going on.  He also mentioned coligan injections to help rebuild muscle.    He is wanting someone to call him and let him know what can be done.

## 2018-11-05 DIAGNOSIS — J20.9 ACUTE BRONCHITIS, UNSPECIFIED ORGANISM: ICD-10-CM

## 2018-11-05 RX ORDER — ALBUTEROL SULFATE 90 UG/1
2 AEROSOL, METERED RESPIRATORY (INHALATION) EVERY 4 HOURS PRN
Qty: 1 INHALER | Refills: 3 | Status: SHIPPED | OUTPATIENT
Start: 2018-11-05 | End: 2018-11-06

## 2018-11-05 RX ORDER — ALBUTEROL SULFATE 2.5 MG/3ML
2.5 SOLUTION RESPIRATORY (INHALATION) EVERY 4 HOURS PRN
Qty: 30 VIAL | Refills: 0 | Status: SHIPPED | OUTPATIENT
Start: 2018-11-05 | End: 2019-07-08 | Stop reason: SDUPTHER

## 2018-11-05 NOTE — TELEPHONE ENCOUNTER
Patient informed there should still be refills remaining at the pharmacy of [facility], but the Albuterol inhaler and nebulizer refills will be sent to the pharmacy.  Patient advised not to use both the inhaler and nebulizer daily, per Sanjuanita.

## 2018-11-05 NOTE — TELEPHONE ENCOUNTER
Patient stopped by the office and is needing a refill on the following medications:    Albuterol inhaler  Albuterol nebulizer solution  Claritin 10 mg.  Singulair 10 mg.      Patient stated that he is still having issues with his shoulders and is waiting to start PT. He is going to go to the chiropractor in the meantime.

## 2018-11-06 DIAGNOSIS — J45.909 ASTHMA DUE TO ENVIRONMENTAL ALLERGIES: Primary | ICD-10-CM

## 2018-11-06 RX ORDER — ALBUTEROL SULFATE 90 UG/1
2 AEROSOL, METERED RESPIRATORY (INHALATION) EVERY 4 HOURS PRN
Qty: 6.7 G | Refills: 2 | Status: SHIPPED | OUTPATIENT
Start: 2018-11-06 | End: 2019-03-04 | Stop reason: SDUPTHER

## 2018-11-30 ENCOUNTER — HOSPITAL ENCOUNTER (OUTPATIENT)
Dept: PHYSICAL THERAPY | Facility: HOSPITAL | Age: 43
Setting detail: THERAPIES SERIES
Discharge: HOME | End: 2018-11-30

## 2018-11-30 DIAGNOSIS — M54.2 CERVICALGIA: ICD-10-CM

## 2018-11-30 DIAGNOSIS — M25.511 CHRONIC RIGHT SHOULDER PAIN: ICD-10-CM

## 2018-11-30 DIAGNOSIS — S83.511A SPRAIN OF ANTERIOR CRUCIATE LIGAMENT OF RIGHT KNEE, INITIAL ENCOUNTER: Primary | ICD-10-CM

## 2018-11-30 DIAGNOSIS — S13.9XXA NECK SPRAIN, INITIAL ENCOUNTER: ICD-10-CM

## 2018-11-30 DIAGNOSIS — G89.29 CHRONIC RIGHT SHOULDER PAIN: ICD-10-CM

## 2018-11-30 PROCEDURE — 97161 PT EVAL LOW COMPLEX 20 MIN: CPT | Performed by: PHYSICAL THERAPIST

## 2018-12-10 ENCOUNTER — OFFICE VISIT (OUTPATIENT)
Dept: INTERNAL MEDICINE | Facility: CLINIC | Age: 43
End: 2018-12-10

## 2018-12-10 ENCOUNTER — LAB (OUTPATIENT)
Dept: LAB | Facility: HOSPITAL | Age: 43
End: 2018-12-10
Attending: INTERNAL MEDICINE

## 2018-12-10 VITALS
OXYGEN SATURATION: 98 % | DIASTOLIC BLOOD PRESSURE: 74 MMHG | HEART RATE: 76 BPM | RESPIRATION RATE: 16 BRPM | HEIGHT: 65 IN | BODY MASS INDEX: 22.41 KG/M2 | SYSTOLIC BLOOD PRESSURE: 120 MMHG | WEIGHT: 134.5 LBS

## 2018-12-10 DIAGNOSIS — M25.511 CHRONIC RIGHT SHOULDER PAIN: ICD-10-CM
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
## 2018-12-10 DIAGNOSIS — G89.29 CHRONIC RIGHT SHOULDER PAIN: ICD-10-CM

## 2018-12-10 DIAGNOSIS — Z00.00 ENCOUNTER FOR PREVENTIVE HEALTH EXAMINATION: ICD-10-CM

## 2018-12-10 DIAGNOSIS — B35.3 TINEA PEDIS OF BOTH FEET: ICD-10-CM

## 2018-12-10 DIAGNOSIS — M54.2 CERVICALGIA: Primary | ICD-10-CM

## 2018-12-10 DIAGNOSIS — S46.912A STRAIN OF LEFT SHOULDER, INITIAL ENCOUNTER: ICD-10-CM

## 2018-12-10 LAB — HIV1+2 AB SER QL: NEGATIVE

## 2018-12-10 PROCEDURE — 36415 COLL VENOUS BLD VENIPUNCTURE: CPT

## 2018-12-10 PROCEDURE — 99214 OFFICE O/P EST MOD 30 MIN: CPT | Performed by: INTERNAL MEDICINE

## 2018-12-10 PROCEDURE — G0432 EIA HIV-1/HIV-2 SCREEN: HCPCS | Performed by: INTERNAL MEDICINE

## 2018-12-10 RX ORDER — TERBINAFINE HYDROCHLORIDE 250 MG/1
250 TABLET ORAL DAILY
Qty: 14 TABLET | Refills: 1 | Status: SHIPPED | OUTPATIENT
Start: 2018-12-10 | End: 2018-12-26
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
## 2018-12-12 ENCOUNTER — TELEPHONE (OUTPATIENT)
Dept: INTERNAL MEDICINE | Facility: CLINIC | Age: 43
End: 2018-12-12

## 2018-12-13 NOTE — TELEPHONE ENCOUNTER
Patient was informed      ----- Message from Reji Hardy DO sent at 12/10/2018  9:30 PM CST -----  We have confirmed that your HIV test is negative and we will not need to test this in the future unless circumstances change.

## 2018-12-26 ENCOUNTER — OFFICE VISIT (OUTPATIENT)
Dept: INTERNAL MEDICINE | Facility: CLINIC | Age: 43
End: 2018-12-26

## 2018-12-26 VITALS
WEIGHT: 134.4 LBS | SYSTOLIC BLOOD PRESSURE: 127 MMHG | OXYGEN SATURATION: 98 % | RESPIRATION RATE: 16 BRPM | HEIGHT: 65 IN | BODY MASS INDEX: 22.39 KG/M2 | HEART RATE: 75 BPM | DIASTOLIC BLOOD PRESSURE: 81 MMHG

## 2018-12-26 DIAGNOSIS — L01.00 IMPETIGO: Primary | ICD-10-CM

## 2018-12-26 PROCEDURE — 99214 OFFICE O/P EST MOD 30 MIN: CPT | Performed by: NURSE PRACTITIONER

## 2018-12-26 RX ORDER — MUPIROCIN CALCIUM 20 MG/G
CREAM TOPICAL 3 TIMES DAILY
Qty: 30 G | Refills: 0 | Status: SHIPPED | OUTPATIENT
Start: 2018-12-26 | End: 2019-07-08

## 2018-12-26 NOTE — PROGRESS NOTES
CC: rash on face    History:  Jose Pace is a 43 y.o. male who presents today for follow-up for evaluation of the above:  Patient presents today with c/o a rash in his face. He states that this rash on face began two days after starting Lamisil oral two weeks ago.   He does report that his feet are better. He did stopped lamisil yesterday. His face has a stiff feeling. Bumps in his mustache and beard area that rupture with a yellow crust. Right cervical lymphadenopathy. Pain with swallowing. He has been applying OTC neosporin with no improvement and states the rash is worsening.   No fever.    ROS:  Review of Systems   Constitutional: Negative for fever.   HENT: Positive for facial swelling, sore throat and trouble swallowing.    Respiratory: Negative for shortness of breath.    Skin: Positive for rash.       Mr. Pace  reports that  has never smoked. he has never used smokeless tobacco. He reports that he drinks alcohol. He reports that he uses drugs. Drug: Marijuana.      Current Outpatient Medications:   •  albuterol (PROVENTIL HFA;VENTOLIN HFA) 108 (90 Base) MCG/ACT inhaler, Inhale 2 puffs Every 4 (Four) Hours As Needed for Wheezing., Disp: 6.7 g, Rfl: 2  •  albuterol (PROVENTIL) (2.5 MG/3ML) 0.083% nebulizer solution, Take 2.5 mg by nebulization Every 4 (Four) Hours As Needed for Wheezing., Disp: 30 vial, Rfl: 0  •  cyclobenzaprine (FLEXERIL) 10 MG tablet, Take 1 tablet by mouth 3 (Three) Times a Day As Needed for Muscle Spasms., Disp: 90 tablet, Rfl: 0  •  diclofenac (VOLTAREN) 75 MG EC tablet, Take 1 tablet by mouth 2 (Two) Times a Day., Disp: 60 tablet, Rfl: 1  •  loratadine (CLARITIN) 10 MG tablet, Take 1 tablet by mouth Daily., Disp: 30 tablet, Rfl: 6  •  montelukast (SINGULAIR) 10 MG tablet, Take 1 tablet by mouth Every Night., Disp: 30 tablet, Rfl: 6  •  ondansetron ODT (ZOFRAN-ODT) 4 MG disintegrating tablet, Take 1 tablet by mouth Every 8 (Eight) Hours As Needed for Nausea or Vomiting., Disp: 12  "tablet, Rfl: 0        OBJECTIVE:  /81 (BP Location: Left arm, Patient Position: Sitting, Cuff Size: Adult)   Pulse 75   Resp 16   Ht 165.1 cm (65\")   Wt 61 kg (134 lb 6.4 oz)   SpO2 98%   BMI 22.37 kg/m²    Physical Exam   Constitutional: He is oriented to person, place, and time. He appears well-developed and well-nourished.   HENT:   Head: Normocephalic and atraumatic.   Mouth/Throat: No oropharyngeal exudate, posterior oropharyngeal edema or posterior oropharyngeal erythema.   Eyes: Conjunctivae and EOM are normal. Pupils are equal, round, and reactive to light.   Neck: Normal range of motion. Neck supple.   Cardiovascular: Normal rate, regular rhythm and normal heart sounds.   Pulmonary/Chest: Effort normal and breath sounds normal.   Abdominal: Soft. Bowel sounds are normal.   Lymphadenopathy:        Head (right side): Submandibular adenopathy present.     He has cervical adenopathy.        Right cervical: Superficial cervical adenopathy present.   Neurological: He is alert and oriented to person, place, and time. He has normal reflexes.   Skin: Skin is warm and dry. Rash noted. Rash is pustular and vesicular.   Honey colored crust from rash   Psychiatric: He has a normal mood and affect.   Vitals reviewed.      Assessment/Plan    Diagnoses and all orders for this visit:    Impetigo  -     mupirocin (BACTROBAN) 2 % cream; Apply  topically to the appropriate area as directed 3 (Three) Times a Day.  -     dicloxacillin (DYNAPEN) 500 MG capsule; Take 1 capsule by mouth 4 (Four) Times a Day for 7 days.    f/u if no improvement. He has stopped Lamisil. discussed that his symptoms appear to be impetigo and I do not suspect this is due to an allergy to Lamisil but would not recommend continuing this at this time.     An After Visit Summary was printed and given to the patient at discharge.  Return for Next scheduled follow up. Sooner if problems arise.         Sanjuanita DAILY. 12/26/2018     "

## 2019-01-04 ENCOUNTER — HOSPITAL ENCOUNTER (OUTPATIENT)
Dept: PHYSICAL THERAPY | Facility: HOSPITAL | Age: 44
Setting detail: THERAPIES SERIES
Discharge: HOME | End: 2019-01-04

## 2019-01-04 DIAGNOSIS — M25.511 CHRONIC RIGHT SHOULDER PAIN: ICD-10-CM

## 2019-01-04 DIAGNOSIS — S13.9XXA NECK SPRAIN, INITIAL ENCOUNTER: Primary | ICD-10-CM

## 2019-01-04 DIAGNOSIS — S83.511A SPRAIN OF ANTERIOR CRUCIATE LIGAMENT OF RIGHT KNEE, INITIAL ENCOUNTER: ICD-10-CM

## 2019-01-04 DIAGNOSIS — G89.29 CHRONIC RIGHT SHOULDER PAIN: ICD-10-CM

## 2019-01-04 DIAGNOSIS — M54.2 CERVICALGIA: ICD-10-CM

## 2019-01-04 PROCEDURE — 97110 THERAPEUTIC EXERCISES: CPT | Performed by: PHYSICAL THERAPIST

## 2019-01-04 PROCEDURE — 97140 MANUAL THERAPY 1/> REGIONS: CPT | Performed by: PHYSICAL THERAPIST
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
## 2019-01-09 ENCOUNTER — APPOINTMENT (OUTPATIENT)
Dept: PHYSICAL THERAPY | Facility: HOSPITAL | Age: 44
End: 2019-01-09

## 2019-01-10 ENCOUNTER — HOSPITAL ENCOUNTER (OUTPATIENT)
Dept: PHYSICAL THERAPY | Facility: HOSPITAL | Age: 44
Setting detail: THERAPIES SERIES
Discharge: HOME | End: 2019-01-10

## 2019-01-10 DIAGNOSIS — G89.29 CHRONIC RIGHT SHOULDER PAIN: ICD-10-CM

## 2019-01-10 DIAGNOSIS — M54.2 CERVICALGIA: ICD-10-CM

## 2019-01-10 DIAGNOSIS — M25.511 CHRONIC RIGHT SHOULDER PAIN: ICD-10-CM

## 2019-01-10 DIAGNOSIS — S13.9XXA NECK SPRAIN, INITIAL ENCOUNTER: Primary | ICD-10-CM

## 2019-01-10 DIAGNOSIS — S83.511A SPRAIN OF ANTERIOR CRUCIATE LIGAMENT OF RIGHT KNEE, INITIAL ENCOUNTER: ICD-10-CM

## 2019-01-10 PROCEDURE — 97140 MANUAL THERAPY 1/> REGIONS: CPT

## 2019-01-10 PROCEDURE — 97110 THERAPEUTIC EXERCISES: CPT
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
## 2019-01-14 ENCOUNTER — HOSPITAL ENCOUNTER (OUTPATIENT)
Dept: PHYSICAL THERAPY | Facility: HOSPITAL | Age: 44
Setting detail: THERAPIES SERIES
Discharge: HOME | End: 2019-01-14

## 2019-01-14 DIAGNOSIS — S83.511A SPRAIN OF ANTERIOR CRUCIATE LIGAMENT OF RIGHT KNEE, INITIAL ENCOUNTER: ICD-10-CM

## 2019-01-14 DIAGNOSIS — M25.511 CHRONIC RIGHT SHOULDER PAIN: ICD-10-CM

## 2019-01-14 DIAGNOSIS — S13.9XXA NECK SPRAIN, INITIAL ENCOUNTER: Primary | ICD-10-CM

## 2019-01-14 DIAGNOSIS — M54.2 CERVICALGIA: ICD-10-CM

## 2019-01-14 DIAGNOSIS — G89.29 CHRONIC RIGHT SHOULDER PAIN: ICD-10-CM

## 2019-01-14 PROCEDURE — 97032 APPL MODALITY 1+ESTIM EA 15: CPT | Performed by: PHYSICAL THERAPIST

## 2019-01-14 PROCEDURE — 97140 MANUAL THERAPY 1/> REGIONS: CPT | Performed by: PHYSICAL THERAPIST
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
## 2019-01-23 ENCOUNTER — HOSPITAL ENCOUNTER (OUTPATIENT)
Dept: PHYSICAL THERAPY | Facility: HOSPITAL | Age: 44
Setting detail: THERAPIES SERIES
Discharge: HOME | End: 2019-01-23

## 2019-01-23 DIAGNOSIS — S83.511A SPRAIN OF ANTERIOR CRUCIATE LIGAMENT OF RIGHT KNEE, INITIAL ENCOUNTER: ICD-10-CM

## 2019-01-23 DIAGNOSIS — M54.2 CERVICALGIA: Primary | ICD-10-CM

## 2019-01-23 DIAGNOSIS — M25.511 CHRONIC RIGHT SHOULDER PAIN: ICD-10-CM

## 2019-01-23 DIAGNOSIS — S13.9XXA NECK SPRAIN, INITIAL ENCOUNTER: ICD-10-CM

## 2019-01-23 DIAGNOSIS — G89.29 CHRONIC RIGHT SHOULDER PAIN: ICD-10-CM

## 2019-01-23 PROCEDURE — 97110 THERAPEUTIC EXERCISES: CPT

## 2019-01-23 PROCEDURE — 97032 APPL MODALITY 1+ESTIM EA 15: CPT
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
## 2019-01-30 ENCOUNTER — HOSPITAL ENCOUNTER (OUTPATIENT)
Dept: PHYSICAL THERAPY | Facility: HOSPITAL | Age: 44
Setting detail: THERAPIES SERIES
Discharge: HOME | End: 2019-01-30

## 2019-01-30 DIAGNOSIS — S13.9XXA NECK SPRAIN, INITIAL ENCOUNTER: ICD-10-CM

## 2019-01-30 DIAGNOSIS — M25.511 CHRONIC RIGHT SHOULDER PAIN: ICD-10-CM

## 2019-01-30 DIAGNOSIS — G89.29 CHRONIC RIGHT SHOULDER PAIN: ICD-10-CM

## 2019-01-30 DIAGNOSIS — S83.511A SPRAIN OF ANTERIOR CRUCIATE LIGAMENT OF RIGHT KNEE, INITIAL ENCOUNTER: ICD-10-CM

## 2019-01-30 DIAGNOSIS — M54.2 CERVICALGIA: Primary | ICD-10-CM

## 2019-01-30 PROCEDURE — 97140 MANUAL THERAPY 1/> REGIONS: CPT

## 2019-01-30 PROCEDURE — 97110 THERAPEUTIC EXERCISES: CPT
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
## 2019-02-06 ENCOUNTER — HOSPITAL ENCOUNTER (OUTPATIENT)
Dept: PHYSICAL THERAPY | Facility: HOSPITAL | Age: 44
Setting detail: THERAPIES SERIES
Discharge: HOME | End: 2019-02-06

## 2019-02-06 DIAGNOSIS — G89.29 CHRONIC RIGHT SHOULDER PAIN: ICD-10-CM

## 2019-02-06 DIAGNOSIS — M25.511 CHRONIC RIGHT SHOULDER PAIN: ICD-10-CM

## 2019-02-06 DIAGNOSIS — M54.2 CERVICALGIA: Primary | ICD-10-CM

## 2019-02-06 PROCEDURE — 97140 MANUAL THERAPY 1/> REGIONS: CPT

## 2019-02-06 PROCEDURE — 97110 THERAPEUTIC EXERCISES: CPT
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
## 2019-02-13 ENCOUNTER — APPOINTMENT (OUTPATIENT)
Dept: PHYSICAL THERAPY | Facility: HOSPITAL | Age: 44
End: 2019-02-13

## 2019-02-15 ENCOUNTER — HOSPITAL ENCOUNTER (OUTPATIENT)
Dept: PHYSICAL THERAPY | Facility: HOSPITAL | Age: 44
Setting detail: THERAPIES SERIES
Discharge: HOME | End: 2019-02-15

## 2019-02-15 DIAGNOSIS — M54.2 CERVICALGIA: Primary | ICD-10-CM

## 2019-02-15 DIAGNOSIS — S83.511A SPRAIN OF ANTERIOR CRUCIATE LIGAMENT OF RIGHT KNEE, INITIAL ENCOUNTER: ICD-10-CM

## 2019-02-15 DIAGNOSIS — G89.29 CHRONIC RIGHT SHOULDER PAIN: ICD-10-CM

## 2019-02-15 DIAGNOSIS — S13.9XXA NECK SPRAIN, INITIAL ENCOUNTER: ICD-10-CM

## 2019-02-15 DIAGNOSIS — M25.511 CHRONIC RIGHT SHOULDER PAIN: ICD-10-CM

## 2019-02-15 PROCEDURE — 97110 THERAPEUTIC EXERCISES: CPT

## 2019-02-15 PROCEDURE — 97140 MANUAL THERAPY 1/> REGIONS: CPT
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
## 2019-02-22 ENCOUNTER — HOSPITAL ENCOUNTER (OUTPATIENT)
Dept: PHYSICAL THERAPY | Facility: HOSPITAL | Age: 44
Setting detail: THERAPIES SERIES
Discharge: HOME | End: 2019-02-22

## 2019-02-22 DIAGNOSIS — G89.29 CHRONIC RIGHT SHOULDER PAIN: ICD-10-CM

## 2019-02-22 DIAGNOSIS — M25.511 CHRONIC RIGHT SHOULDER PAIN: ICD-10-CM

## 2019-02-22 DIAGNOSIS — M54.2 CERVICALGIA: Primary | ICD-10-CM

## 2019-02-22 PROCEDURE — 97110 THERAPEUTIC EXERCISES: CPT

## 2019-02-22 PROCEDURE — 97140 MANUAL THERAPY 1/> REGIONS: CPT
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
## 2019-02-27 ENCOUNTER — HOSPITAL ENCOUNTER (OUTPATIENT)
Dept: PHYSICAL THERAPY | Facility: HOSPITAL | Age: 44
Setting detail: THERAPIES SERIES
Discharge: HOME | End: 2019-02-27

## 2019-02-27 DIAGNOSIS — S83.511A SPRAIN OF ANTERIOR CRUCIATE LIGAMENT OF RIGHT KNEE, INITIAL ENCOUNTER: ICD-10-CM

## 2019-02-27 DIAGNOSIS — M54.2 CERVICALGIA: Primary | ICD-10-CM

## 2019-02-27 DIAGNOSIS — M25.511 CHRONIC RIGHT SHOULDER PAIN: ICD-10-CM

## 2019-02-27 DIAGNOSIS — G89.29 CHRONIC RIGHT SHOULDER PAIN: ICD-10-CM

## 2019-02-27 PROCEDURE — 97140 MANUAL THERAPY 1/> REGIONS: CPT

## 2019-02-27 PROCEDURE — 97032 APPL MODALITY 1+ESTIM EA 15: CPT
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
## 2019-03-04 DIAGNOSIS — J45.909 ASTHMA DUE TO ENVIRONMENTAL ALLERGIES: ICD-10-CM

## 2019-03-04 RX ORDER — ALBUTEROL SULFATE 90 UG/1
2 AEROSOL, METERED RESPIRATORY (INHALATION) EVERY 4 HOURS PRN
Qty: 6.7 G | Refills: 2 | Status: SHIPPED | OUTPATIENT
Start: 2019-03-04 | End: 2019-05-10 | Stop reason: SDUPTHER

## 2019-03-06 ENCOUNTER — HOSPITAL ENCOUNTER (OUTPATIENT)
Dept: PHYSICAL THERAPY | Facility: HOSPITAL | Age: 44
Setting detail: THERAPIES SERIES
Discharge: HOME | End: 2019-03-06

## 2019-03-06 DIAGNOSIS — M54.2 CERVICALGIA: Primary | ICD-10-CM

## 2019-03-06 DIAGNOSIS — G89.29 CHRONIC RIGHT SHOULDER PAIN: ICD-10-CM

## 2019-03-06 DIAGNOSIS — S13.9XXA NECK SPRAIN, INITIAL ENCOUNTER: ICD-10-CM

## 2019-03-06 DIAGNOSIS — M25.511 CHRONIC RIGHT SHOULDER PAIN: ICD-10-CM

## 2019-03-06 PROCEDURE — 97110 THERAPEUTIC EXERCISES: CPT

## 2019-03-06 PROCEDURE — 97140 MANUAL THERAPY 1/> REGIONS: CPT
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
## 2019-03-13 ENCOUNTER — APPOINTMENT (OUTPATIENT)
Dept: PHYSICAL THERAPY | Facility: HOSPITAL | Age: 44
End: 2019-03-13

## 2019-03-14 ENCOUNTER — HOSPITAL ENCOUNTER (OUTPATIENT)
Dept: PHYSICAL THERAPY | Facility: HOSPITAL | Age: 44
Setting detail: THERAPIES SERIES
Discharge: HOME | End: 2019-03-14

## 2019-03-14 DIAGNOSIS — S13.9XXA NECK SPRAIN, INITIAL ENCOUNTER: ICD-10-CM

## 2019-03-14 DIAGNOSIS — M54.2 CERVICALGIA: Primary | ICD-10-CM

## 2019-03-14 DIAGNOSIS — G89.29 CHRONIC RIGHT SHOULDER PAIN: ICD-10-CM

## 2019-03-14 DIAGNOSIS — S83.511A SPRAIN OF ANTERIOR CRUCIATE LIGAMENT OF RIGHT KNEE, INITIAL ENCOUNTER: ICD-10-CM

## 2019-03-14 DIAGNOSIS — M25.511 CHRONIC RIGHT SHOULDER PAIN: ICD-10-CM

## 2019-03-14 PROCEDURE — 97110 THERAPEUTIC EXERCISES: CPT

## 2019-03-14 PROCEDURE — 97140 MANUAL THERAPY 1/> REGIONS: CPT
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
## 2019-03-20 ENCOUNTER — HOSPITAL ENCOUNTER (OUTPATIENT)
Dept: PHYSICAL THERAPY | Facility: HOSPITAL | Age: 44
Setting detail: THERAPIES SERIES
Discharge: HOME | End: 2019-03-20

## 2019-03-20 DIAGNOSIS — S13.9XXA NECK SPRAIN, INITIAL ENCOUNTER: ICD-10-CM

## 2019-03-20 DIAGNOSIS — M54.2 CERVICALGIA: Primary | ICD-10-CM

## 2019-03-20 DIAGNOSIS — M25.511 CHRONIC RIGHT SHOULDER PAIN: ICD-10-CM

## 2019-03-20 DIAGNOSIS — G89.29 CHRONIC RIGHT SHOULDER PAIN: ICD-10-CM

## 2019-03-20 DIAGNOSIS — S83.511A SPRAIN OF ANTERIOR CRUCIATE LIGAMENT OF RIGHT KNEE, INITIAL ENCOUNTER: ICD-10-CM

## 2019-03-20 PROCEDURE — 97110 THERAPEUTIC EXERCISES: CPT
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
## 2019-03-27 ENCOUNTER — HOSPITAL ENCOUNTER (OUTPATIENT)
Dept: PHYSICAL THERAPY | Facility: HOSPITAL | Age: 44
Setting detail: THERAPIES SERIES
Discharge: HOME | End: 2019-03-27

## 2019-03-27 DIAGNOSIS — G89.29 CHRONIC RIGHT SHOULDER PAIN: ICD-10-CM

## 2019-03-27 DIAGNOSIS — S83.511A SPRAIN OF ANTERIOR CRUCIATE LIGAMENT OF RIGHT KNEE, INITIAL ENCOUNTER: ICD-10-CM

## 2019-03-27 DIAGNOSIS — M54.2 CERVICALGIA: Primary | ICD-10-CM

## 2019-03-27 DIAGNOSIS — S13.9XXA NECK SPRAIN, INITIAL ENCOUNTER: ICD-10-CM

## 2019-03-27 DIAGNOSIS — M25.511 CHRONIC RIGHT SHOULDER PAIN: ICD-10-CM

## 2019-03-27 PROCEDURE — 97140 MANUAL THERAPY 1/> REGIONS: CPT | Performed by: PHYSICAL THERAPIST

## 2019-03-27 PROCEDURE — 97110 THERAPEUTIC EXERCISES: CPT | Performed by: PHYSICAL THERAPIST
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
## 2019-04-03 ENCOUNTER — HOSPITAL ENCOUNTER (OUTPATIENT)
Dept: PHYSICAL THERAPY | Facility: HOSPITAL | Age: 44
Setting detail: THERAPIES SERIES
Discharge: HOME | End: 2019-04-03

## 2019-04-03 DIAGNOSIS — S83.511A SPRAIN OF ANTERIOR CRUCIATE LIGAMENT OF RIGHT KNEE, INITIAL ENCOUNTER: ICD-10-CM

## 2019-04-03 DIAGNOSIS — M54.2 CERVICALGIA: Primary | ICD-10-CM

## 2019-04-03 DIAGNOSIS — S13.9XXA NECK SPRAIN, INITIAL ENCOUNTER: ICD-10-CM

## 2019-04-03 DIAGNOSIS — G89.29 CHRONIC RIGHT SHOULDER PAIN: ICD-10-CM

## 2019-04-03 DIAGNOSIS — M25.511 CHRONIC RIGHT SHOULDER PAIN: ICD-10-CM

## 2019-04-03 PROCEDURE — 97110 THERAPEUTIC EXERCISES: CPT

## 2019-04-03 PROCEDURE — 97140 MANUAL THERAPY 1/> REGIONS: CPT

## 2019-04-03 PROCEDURE — 97140 MANUAL THERAPY 1/> REGIONS: CPT | Performed by: PHYSICAL THERAPIST

## 2019-04-03 PROCEDURE — 97110 THERAPEUTIC EXERCISES: CPT | Performed by: PHYSICAL THERAPIST
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
## 2019-04-10 ENCOUNTER — HOSPITAL ENCOUNTER (OUTPATIENT)
Dept: PHYSICAL THERAPY | Facility: HOSPITAL | Age: 44
Setting detail: THERAPIES SERIES
Discharge: HOME | End: 2019-04-10

## 2019-04-10 DIAGNOSIS — G89.29 CHRONIC RIGHT SHOULDER PAIN: ICD-10-CM

## 2019-04-10 DIAGNOSIS — S83.511A SPRAIN OF ANTERIOR CRUCIATE LIGAMENT OF RIGHT KNEE, INITIAL ENCOUNTER: ICD-10-CM

## 2019-04-10 DIAGNOSIS — M25.511 CHRONIC RIGHT SHOULDER PAIN: ICD-10-CM

## 2019-04-10 DIAGNOSIS — S13.9XXA NECK SPRAIN, INITIAL ENCOUNTER: ICD-10-CM

## 2019-04-10 DIAGNOSIS — M54.2 CERVICALGIA: Primary | ICD-10-CM

## 2019-04-10 PROCEDURE — 97110 THERAPEUTIC EXERCISES: CPT

## 2019-04-10 PROCEDURE — 97140 MANUAL THERAPY 1/> REGIONS: CPT
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
## 2019-04-12 ENCOUNTER — HOSPITAL ENCOUNTER (OUTPATIENT)
Dept: PHYSICAL THERAPY | Facility: HOSPITAL | Age: 44
Setting detail: THERAPIES SERIES
Discharge: HOME | End: 2019-04-12

## 2019-04-12 DIAGNOSIS — G89.29 CHRONIC RIGHT SHOULDER PAIN: ICD-10-CM

## 2019-04-12 DIAGNOSIS — S83.511A SPRAIN OF ANTERIOR CRUCIATE LIGAMENT OF RIGHT KNEE, INITIAL ENCOUNTER: ICD-10-CM

## 2019-04-12 DIAGNOSIS — M25.511 CHRONIC RIGHT SHOULDER PAIN: ICD-10-CM

## 2019-04-12 DIAGNOSIS — M54.2 CERVICALGIA: Primary | ICD-10-CM

## 2019-04-12 DIAGNOSIS — S13.9XXA NECK SPRAIN, INITIAL ENCOUNTER: ICD-10-CM

## 2019-04-12 PROCEDURE — 97110 THERAPEUTIC EXERCISES: CPT
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
## 2019-04-15 ENCOUNTER — HOSPITAL ENCOUNTER (OUTPATIENT)
Dept: PHYSICAL THERAPY | Facility: HOSPITAL | Age: 44
Setting detail: THERAPIES SERIES
Discharge: HOME | End: 2019-04-15

## 2019-04-15 DIAGNOSIS — G89.29 CHRONIC RIGHT SHOULDER PAIN: ICD-10-CM

## 2019-04-15 DIAGNOSIS — S83.511A SPRAIN OF ANTERIOR CRUCIATE LIGAMENT OF RIGHT KNEE, INITIAL ENCOUNTER: ICD-10-CM

## 2019-04-15 DIAGNOSIS — M25.511 CHRONIC RIGHT SHOULDER PAIN: ICD-10-CM

## 2019-04-15 DIAGNOSIS — M54.2 CERVICALGIA: Primary | ICD-10-CM

## 2019-04-15 DIAGNOSIS — S13.9XXA NECK SPRAIN, INITIAL ENCOUNTER: ICD-10-CM

## 2019-04-15 PROCEDURE — 97110 THERAPEUTIC EXERCISES: CPT

## 2019-04-15 PROCEDURE — 97140 MANUAL THERAPY 1/> REGIONS: CPT
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
## 2019-04-18 ENCOUNTER — HOSPITAL ENCOUNTER (OUTPATIENT)
Dept: PHYSICAL THERAPY | Facility: HOSPITAL | Age: 44
Setting detail: THERAPIES SERIES
Discharge: HOME | End: 2019-04-18

## 2019-04-18 DIAGNOSIS — M54.2 CERVICALGIA: Primary | ICD-10-CM

## 2019-04-18 DIAGNOSIS — M25.511 CHRONIC RIGHT SHOULDER PAIN: ICD-10-CM

## 2019-04-18 DIAGNOSIS — G89.29 CHRONIC RIGHT SHOULDER PAIN: ICD-10-CM

## 2019-04-18 PROCEDURE — 97140 MANUAL THERAPY 1/> REGIONS: CPT

## 2019-04-18 PROCEDURE — 97110 THERAPEUTIC EXERCISES: CPT
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
## 2019-04-22 ENCOUNTER — HOSPITAL ENCOUNTER (OUTPATIENT)
Dept: PHYSICAL THERAPY | Facility: HOSPITAL | Age: 44
Setting detail: THERAPIES SERIES
Discharge: HOME | End: 2019-04-22

## 2019-04-22 DIAGNOSIS — S83.511A SPRAIN OF ANTERIOR CRUCIATE LIGAMENT OF RIGHT KNEE, INITIAL ENCOUNTER: ICD-10-CM

## 2019-04-22 DIAGNOSIS — G89.29 CHRONIC RIGHT SHOULDER PAIN: ICD-10-CM

## 2019-04-22 DIAGNOSIS — M54.2 CERVICALGIA: Primary | ICD-10-CM

## 2019-04-22 DIAGNOSIS — S13.9XXA NECK SPRAIN, INITIAL ENCOUNTER: ICD-10-CM

## 2019-04-22 DIAGNOSIS — M25.511 CHRONIC RIGHT SHOULDER PAIN: ICD-10-CM

## 2019-04-22 PROCEDURE — 97110 THERAPEUTIC EXERCISES: CPT

## 2019-04-22 PROCEDURE — 97140 MANUAL THERAPY 1/> REGIONS: CPT
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
## 2019-04-24 ENCOUNTER — APPOINTMENT (OUTPATIENT)
Dept: PHYSICAL THERAPY | Facility: HOSPITAL | Age: 44
End: 2019-04-24

## 2019-04-25 ENCOUNTER — HOSPITAL ENCOUNTER (OUTPATIENT)
Dept: PHYSICAL THERAPY | Facility: HOSPITAL | Age: 44
Setting detail: THERAPIES SERIES
Discharge: HOME | End: 2019-04-25

## 2019-04-25 DIAGNOSIS — G89.29 CHRONIC RIGHT SHOULDER PAIN: ICD-10-CM

## 2019-04-25 DIAGNOSIS — S13.9XXA NECK SPRAIN, INITIAL ENCOUNTER: ICD-10-CM

## 2019-04-25 DIAGNOSIS — M25.511 CHRONIC RIGHT SHOULDER PAIN: ICD-10-CM

## 2019-04-25 DIAGNOSIS — M54.2 CERVICALGIA: Primary | ICD-10-CM

## 2019-04-25 DIAGNOSIS — S83.511A SPRAIN OF ANTERIOR CRUCIATE LIGAMENT OF RIGHT KNEE, INITIAL ENCOUNTER: ICD-10-CM

## 2019-04-25 PROCEDURE — 97110 THERAPEUTIC EXERCISES: CPT
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
## 2019-04-29 ENCOUNTER — HOSPITAL ENCOUNTER (OUTPATIENT)
Dept: PHYSICAL THERAPY | Facility: HOSPITAL | Age: 44
Setting detail: THERAPIES SERIES
Discharge: HOME | End: 2019-04-29

## 2019-04-29 DIAGNOSIS — G89.29 CHRONIC RIGHT SHOULDER PAIN: ICD-10-CM

## 2019-04-29 DIAGNOSIS — M54.2 CERVICALGIA: Primary | ICD-10-CM

## 2019-04-29 DIAGNOSIS — M25.511 CHRONIC RIGHT SHOULDER PAIN: ICD-10-CM

## 2019-04-29 PROCEDURE — 97032 APPL MODALITY 1+ESTIM EA 15: CPT

## 2019-04-29 PROCEDURE — 97140 MANUAL THERAPY 1/> REGIONS: CPT
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
## 2019-05-01 ENCOUNTER — HOSPITAL ENCOUNTER (OUTPATIENT)
Dept: PHYSICAL THERAPY | Facility: HOSPITAL | Age: 44
Setting detail: THERAPIES SERIES
Discharge: HOME | End: 2019-05-01

## 2019-05-01 DIAGNOSIS — M54.2 CERVICALGIA: ICD-10-CM

## 2019-05-01 DIAGNOSIS — S83.511A SPRAIN OF ANTERIOR CRUCIATE LIGAMENT OF RIGHT KNEE, INITIAL ENCOUNTER: ICD-10-CM

## 2019-05-01 DIAGNOSIS — S13.9XXA NECK SPRAIN, INITIAL ENCOUNTER: ICD-10-CM

## 2019-05-01 DIAGNOSIS — G89.29 CHRONIC RIGHT SHOULDER PAIN: Primary | ICD-10-CM

## 2019-05-01 DIAGNOSIS — M25.511 CHRONIC RIGHT SHOULDER PAIN: Primary | ICD-10-CM

## 2019-05-01 PROCEDURE — 97140 MANUAL THERAPY 1/> REGIONS: CPT

## 2019-05-01 PROCEDURE — 97110 THERAPEUTIC EXERCISES: CPT
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
## 2019-05-06 ENCOUNTER — HOSPITAL ENCOUNTER (OUTPATIENT)
Dept: PHYSICAL THERAPY | Facility: HOSPITAL | Age: 44
Setting detail: THERAPIES SERIES
Discharge: HOME | End: 2019-05-06

## 2019-05-06 PROCEDURE — 97110 THERAPEUTIC EXERCISES: CPT

## 2019-05-06 PROCEDURE — 97140 MANUAL THERAPY 1/> REGIONS: CPT
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
## 2019-06-10 ENCOUNTER — OFFICE VISIT (OUTPATIENT)
Dept: INTERNAL MEDICINE | Facility: CLINIC | Age: 44
End: 2019-06-10

## 2019-06-10 VITALS
HEIGHT: 65 IN | DIASTOLIC BLOOD PRESSURE: 72 MMHG | HEART RATE: 92 BPM | WEIGHT: 138.4 LBS | RESPIRATION RATE: 16 BRPM | OXYGEN SATURATION: 97 % | BODY MASS INDEX: 23.06 KG/M2 | SYSTOLIC BLOOD PRESSURE: 124 MMHG

## 2019-06-10 DIAGNOSIS — F12.20 MODERATE TETRAHYDROCANNABINOL (THC) DEPENDENCE (HCC): ICD-10-CM

## 2019-06-10 DIAGNOSIS — Z00.01 ANNUAL VISIT FOR GENERAL ADULT MEDICAL EXAMINATION WITH ABNORMAL FINDINGS: Primary | ICD-10-CM

## 2019-06-10 PROBLEM — S46.912A STRAIN OF LEFT SHOULDER: Status: RESOLVED | Noted: 2018-06-04 | Resolved: 2019-06-10

## 2019-06-10 PROBLEM — M25.511 RIGHT SHOULDER PAIN: Status: RESOLVED | Noted: 2018-10-25 | Resolved: 2019-06-10
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
## 2019-06-10 PROCEDURE — 99396 PREV VISIT EST AGE 40-64: CPT | Performed by: INTERNAL MEDICINE

## 2019-06-10 RX ORDER — FEXOFENADINE HYDROCHLORIDE 60 MG/1
60 TABLET, FILM COATED ORAL DAILY
COMMUNITY
End: 2020-07-08 | Stop reason: SDUPTHER
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
## 2019-06-12 ENCOUNTER — LAB (OUTPATIENT)
Dept: LAB | Facility: HOSPITAL | Age: 44
End: 2019-06-12

## 2019-06-12 DIAGNOSIS — Z00.01 ANNUAL VISIT FOR GENERAL ADULT MEDICAL EXAMINATION WITH ABNORMAL FINDINGS: ICD-10-CM

## 2019-06-12 DIAGNOSIS — B35.3 TINEA PEDIS OF BOTH FEET: ICD-10-CM

## 2019-06-12 LAB
ALBUMIN SERPL-MCNC: 4.2 G/DL (ref 3.5–5)
ALBUMIN/GLOB SERPL: 1.5 G/DL (ref 1.1–2.5)
ALP SERPL-CCNC: 66 U/L (ref 24–120)
ALT SERPL W P-5'-P-CCNC: 113 U/L (ref 0–54)
ANION GAP SERPL CALCULATED.3IONS-SCNC: 5 MMOL/L (ref 4–13)
ARTICHOKE IGE QN: 78 MG/DL (ref 0–99)
AST SERPL-CCNC: 90 U/L (ref 7–45)
BILIRUB SERPL-MCNC: 1 MG/DL (ref 0.1–1)
BUN BLD-MCNC: 22 MG/DL (ref 5–21)
BUN/CREAT SERPL: 17.1 (ref 7–25)
CALCIUM SPEC-SCNC: 9.3 MG/DL (ref 8.4–10.4)
CHLORIDE SERPL-SCNC: 104 MMOL/L (ref 98–110)
CHOLEST SERPL-MCNC: 146 MG/DL (ref 130–200)
CO2 SERPL-SCNC: 28 MMOL/L (ref 24–31)
CREAT BLD-MCNC: 1.29 MG/DL (ref 0.5–1.4)
GFR SERPL CREATININE-BSD FRML MDRD: 74 ML/MIN/1.73
GLOBULIN UR ELPH-MCNC: 2.8 GM/DL
GLUCOSE BLD-MCNC: 107 MG/DL (ref 70–100)
HBA1C MFR BLD: 4.5 %
HDLC SERPL-MCNC: 48 MG/DL
LDLC/HDLC SERPL: 1.75 {RATIO}
POTASSIUM BLD-SCNC: 4.5 MMOL/L (ref 3.5–5.3)
PROT SERPL-MCNC: 7 G/DL (ref 6.3–8.7)
SODIUM BLD-SCNC: 137 MMOL/L (ref 135–145)
TRIGL SERPL-MCNC: 70 MG/DL (ref 0–149)

## 2019-06-12 PROCEDURE — 80053 COMPREHEN METABOLIC PANEL: CPT | Performed by: INTERNAL MEDICINE

## 2019-06-12 PROCEDURE — 83036 HEMOGLOBIN GLYCOSYLATED A1C: CPT | Performed by: INTERNAL MEDICINE

## 2019-06-12 PROCEDURE — 80061 LIPID PANEL: CPT | Performed by: INTERNAL MEDICINE

## 2019-06-12 PROCEDURE — 36415 COLL VENOUS BLD VENIPUNCTURE: CPT

## 2019-07-02 ENCOUNTER — TELEPHONE (OUTPATIENT)
Dept: INTERNAL MEDICINE | Facility: CLINIC | Age: 44
End: 2019-07-02

## 2019-07-02 DIAGNOSIS — J45.909 ASTHMA DUE TO ENVIRONMENTAL ALLERGIES: ICD-10-CM

## 2019-07-02 RX ORDER — ALBUTEROL SULFATE 90 UG/1
2 AEROSOL, METERED RESPIRATORY (INHALATION) EVERY 4 HOURS PRN
Qty: 6.7 G | Refills: 0 | Status: SHIPPED | OUTPATIENT
Start: 2019-07-02 | End: 2019-08-06 | Stop reason: SDUPTHER

## 2019-07-02 NOTE — TELEPHONE ENCOUNTER
Needing refill for albuterol inhaler sent to [facility].    He would also like for someone to contact him to discuss numbness in both hands and pain and discomfort that he is still experiencing from his neck.

## 2019-07-02 NOTE — TELEPHONE ENCOUNTER
Patient informed a refill of the albuterol inhaler has been sent to the pharmacy and an OV is needed to address new symptoms. Patient was transferred to the front office to schedule an appointment.

## 2019-07-08 ENCOUNTER — OFFICE VISIT (OUTPATIENT)
Dept: INTERNAL MEDICINE | Facility: CLINIC | Age: 44
End: 2019-07-08

## 2019-07-08 VITALS
RESPIRATION RATE: 16 BRPM | HEIGHT: 65 IN | TEMPERATURE: 98.2 F | WEIGHT: 140 LBS | HEART RATE: 55 BPM | BODY MASS INDEX: 23.32 KG/M2 | OXYGEN SATURATION: 98 % | DIASTOLIC BLOOD PRESSURE: 68 MMHG | SYSTOLIC BLOOD PRESSURE: 126 MMHG

## 2019-07-08 DIAGNOSIS — J20.9 ACUTE BRONCHITIS, UNSPECIFIED ORGANISM: ICD-10-CM

## 2019-07-08 DIAGNOSIS — R20.0 NUMBNESS AND TINGLING OF BOTH UPPER EXTREMITIES: ICD-10-CM

## 2019-07-08 DIAGNOSIS — R20.2 NUMBNESS AND TINGLING OF BOTH UPPER EXTREMITIES: ICD-10-CM

## 2019-07-08 DIAGNOSIS — M54.2 CERVICALGIA: Primary | ICD-10-CM

## 2019-07-08 PROCEDURE — 99214 OFFICE O/P EST MOD 30 MIN: CPT | Performed by: NURSE PRACTITIONER

## 2019-07-08 RX ORDER — ALBUTEROL SULFATE 2.5 MG/3ML
2.5 SOLUTION RESPIRATORY (INHALATION) EVERY 4 HOURS PRN
Qty: 30 VIAL | Refills: 0 | Status: SHIPPED | OUTPATIENT
Start: 2019-07-08 | End: 2022-07-13 | Stop reason: SDUPTHER
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
## 2019-07-11 ENCOUNTER — DOCUMENTATION (OUTPATIENT)
Dept: PHYSICAL THERAPY | Facility: HOSPITAL | Age: 44
End: 2019-07-11

## 2019-07-11 DIAGNOSIS — S13.9XXA NECK SPRAIN, INITIAL ENCOUNTER: ICD-10-CM

## 2019-07-11 DIAGNOSIS — S83.511A SPRAIN OF ANTERIOR CRUCIATE LIGAMENT OF RIGHT KNEE, INITIAL ENCOUNTER: ICD-10-CM

## 2019-07-11 DIAGNOSIS — G89.29 CHRONIC RIGHT SHOULDER PAIN: Primary | ICD-10-CM

## 2019-07-11 DIAGNOSIS — M54.2 CERVICALGIA: ICD-10-CM

## 2019-07-11 DIAGNOSIS — M25.511 CHRONIC RIGHT SHOULDER PAIN: Primary | ICD-10-CM

## 2019-07-11 DIAGNOSIS — J45.909 ASTHMA DUE TO ENVIRONMENTAL ALLERGIES: ICD-10-CM
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
## 2019-07-12 RX ORDER — MONTELUKAST SODIUM 10 MG/1
10 TABLET ORAL NIGHTLY
Qty: 30 TABLET | Refills: 0 | OUTPATIENT
Start: 2019-07-12

## 2019-07-12 RX ORDER — LORATADINE 10 MG/1
TABLET ORAL
Qty: 30 TABLET | Refills: 0 | OUTPATIENT
Start: 2019-07-12

## 2019-07-18 ENCOUNTER — OFFICE VISIT (OUTPATIENT)
Dept: PULMONOLOGY | Facility: CLINIC | Age: 44
End: 2019-07-18

## 2019-07-18 VITALS
HEIGHT: 65 IN | HEART RATE: 55 BPM | BODY MASS INDEX: 22.99 KG/M2 | WEIGHT: 138 LBS | DIASTOLIC BLOOD PRESSURE: 80 MMHG | OXYGEN SATURATION: 99 % | SYSTOLIC BLOOD PRESSURE: 116 MMHG

## 2019-07-18 DIAGNOSIS — R06.02 SHORTNESS OF BREATH: ICD-10-CM

## 2019-07-18 DIAGNOSIS — J45.909 ASTHMA, UNSPECIFIED ASTHMA SEVERITY, UNSPECIFIED WHETHER COMPLICATED, UNSPECIFIED WHETHER PERSISTENT: ICD-10-CM

## 2019-07-18 DIAGNOSIS — R07.9 CHEST PAIN, UNSPECIFIED TYPE: ICD-10-CM

## 2019-07-18 DIAGNOSIS — J44.9 STAGE 2 MODERATE COPD BY GOLD CLASSIFICATION (HCC): Primary | ICD-10-CM

## 2019-07-18 DIAGNOSIS — J45.909 ASTHMA, UNSPECIFIED ASTHMA SEVERITY, UNSPECIFIED WHETHER COMPLICATED, UNSPECIFIED WHETHER PERSISTENT: Primary | ICD-10-CM

## 2019-07-18 LAB
DIFF CAP.CO: NORMAL ML/MMHG SEC
FEV1/FVC: 68.46 %
FEV1: NORMAL LITERS
FVC VOL RESPIRATORY: NORMAL LITERS
TLC: NORMAL LITERS

## 2019-07-18 PROCEDURE — 99214 OFFICE O/P EST MOD 30 MIN: CPT | Performed by: NURSE PRACTITIONER

## 2019-07-18 PROCEDURE — 94727 GAS DIL/WSHOT DETER LNG VOL: CPT | Performed by: NURSE PRACTITIONER

## 2019-07-18 PROCEDURE — 94010 BREATHING CAPACITY TEST: CPT | Performed by: NURSE PRACTITIONER

## 2019-07-18 PROCEDURE — 94664 DEMO&/EVAL PT USE INHALER: CPT | Performed by: NURSE PRACTITIONER

## 2019-07-18 PROCEDURE — 94729 DIFFUSING CAPACITY: CPT | Performed by: NURSE PRACTITIONER

## 2019-07-18 NOTE — PROGRESS NOTES
" [facility]  [facility]   [facility]  [address] [place] [address]  Phone: [phone]  Fax: [phone]     Jose Pace is a 44 y.o. male.   CC:   Chief Complaint   Patient presents with   • New Patient Established per Shortness of Breath        HPI: This is a pleasant new patient referral with a chief complaint of shortness of breath \"most of the time\".  The patient feels perplexed over this because he has always been very active running, weight lifting and previously even did some cage fighting.  He has a history of asthma and uses a rescue inhaler and/or albuterol nebs as needed.  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  He smoked cigarettes for a short period of time but had ongoing secondhand exposure throughout his childhood years in his home.  He does admit to smoking marijuana frequently.  He is established with Dr. Hardy for routine medical care.  He has had complete pulmonary function testing today that indicates moderate COPD.    The following portions of the patient's history were reviewed and updated as appropriate: allergies, current medications, past family history, past medical history, past social history, past surgical history and problem list.    Past Medical History:   Diagnosis Date   • Asthma        Family History   Problem Relation Age of Onset   • Heart murmur Mother    • Stroke Mother    • Hypertension Mother        Social History     Socioeconomic History   • Marital status:      Spouse name: Not on file   • Number of children: Not on file   • Years of education: Not on file   • Highest education level: Not on file   Tobacco Use   • Smoking status: Former Smoker     Packs/day: 0.25     Years: 5.00     Pack years: 1.25     Types: Cigarettes     Last attempt to quit: 2009     Years since quitting: 10.5   • Smokeless tobacco: Never Used   Substance and Sexual Activity " "  • Alcohol use: Yes     Frequency: 2-3 times a week   • Drug use: Yes     Types: Marijuana     Comment: Occ   • Sexual activity: Defer       Review of Systems   Constitutional: Positive for fatigue. Negative for appetite change, chills and fever.   HENT: Negative for trouble swallowing and voice change.    Eyes: Negative for visual disturbance.   Respiratory: Positive for cough, shortness of breath and wheezing.    Cardiovascular: Negative for chest pain and leg swelling.   Gastrointestinal: Negative for abdominal distention, abdominal pain, nausea and vomiting.   Genitourinary: Negative.    Musculoskeletal: Negative for gait problem and myalgias.   Skin: Negative.    Neurological: Negative for weakness.   Hematological: Negative.    Psychiatric/Behavioral: The patient is not nervous/anxious.        /80   Pulse 55   Ht 163.8 cm (64.5\")   Wt 62.6 kg (138 lb)   SpO2 99% Comment:   BMI 23.32 kg/m²     Physical Exam   Constitutional: He is oriented to person, place, and time. He appears well-developed and well-nourished. No distress.   HENT:   Head: Normocephalic and atraumatic.   Eyes: Pupils are equal, round, and reactive to light. No scleral icterus.   Neck: Normal range of motion. Neck supple.   Cardiovascular: Normal rate, regular rhythm, S1 normal and S2 normal.   Pulmonary/Chest: Effort normal and breath sounds normal. No tachypnea. He has no wheezes. He has no rhonchi. He has no rales.   Abdominal: Soft. Bowel sounds are normal. He exhibits no distension.   Musculoskeletal: Normal range of motion. He exhibits no edema.   Lymphadenopathy:     He has no cervical adenopathy.   Neurological: He is alert and oriented to person, place, and time.   Skin: Skin is warm and dry. No rash noted. No cyanosis. Nails show no clubbing.   Psychiatric: He has a normal mood and affect. His behavior is normal.   Vitals reviewed.      My interpretation of  Pulmonary Functions Testing: Spirometry shows a moderate " obstructive defect with FEV1 of 75% predicted.  Mid flows are decreased at 39% predicted.  Actual FEV1/FVC is 68.46.  Lung volumes reveal normal total lung capacity and functional residual capacity with a decrease in vital capacity.  Residual volume is increased which would be indicative of air trapping.  Diffusion capacity is within normal limits.    FEV1   Date Value Ref Range Status   07/18/2019 75% liters Final     FVC   Date Value Ref Range Status   07/18/2019 91% liters Final     FEV1/FVC   Date Value Ref Range Status   07/18/2019 68.46 % Final     TLC   Date Value Ref Range Status   07/18/2019 96% liters Final     DLCO   Date Value Ref Range Status   07/18/2019 82% ml/mmHg sec Final       Jose was seen today for new patient established per shortness of breath.    Diagnoses and all orders for this visit:    Stage 2 moderate COPD by GOLD classification (CMS/[facility])  -     Cancel: Alpha - 1 - Antitrypsin; Future  -     XR Chest 2 View; Future  -     Home Nebulizer Accessories    Asthma, unspecified asthma severity, unspecified whether complicated, unspecified whether persistent  -     Pulmonary Function Test  -     Home Nebulizer Accessories  -     Alpha - 1 - Antitrypsin; Future    Chest pain, unspecified type  -     XR Chest 2 View; Future    Shortness of breath    Other orders  -     umeclidinium-vilanterol (ANORO ELLIPTA) 62.5-25 MCG/INH aerosol powder  inhaler; Inhale 1 puff Daily for 30 days.      Patient's Body mass index is 23.32 kg/m². BMI is within normal parameters. No follow-up required..      Follow-up/Plan: Alpha-1 testing was obtained today.  The patient is being started on Anoro, 1 puff once a day.  Proper demonstration of the Ellipta device was shown to the patient and a prescription was sent to pharmacy as I did not have any samples for the patient.  A prescription for new nebulizer materials was sent to the patient's pharmacy.  The patient was given educational handouts including a book on  living with COPD.  I will plan to see him back in approximately 6 weeks.    Mike Forbes, [facility]  7/18/2019  11:02 AM

## 2019-07-23 ENCOUNTER — RESULTS ENCOUNTER (OUTPATIENT)
Dept: PULMONOLOGY | Facility: CLINIC | Age: 44
End: 2019-07-23

## 2019-07-23 DIAGNOSIS — J45.909 ASTHMA, UNSPECIFIED ASTHMA SEVERITY, UNSPECIFIED WHETHER COMPLICATED, UNSPECIFIED WHETHER PERSISTENT: ICD-10-CM

## 2019-07-29 ENCOUNTER — HOSPITAL ENCOUNTER (OUTPATIENT)
Dept: GENERAL RADIOLOGY | Facility: HOSPITAL | Age: 44
Discharge: HOME | End: 2019-07-29
Admitting: NURSE PRACTITIONER

## 2019-07-29 DIAGNOSIS — J44.9 STAGE 2 MODERATE COPD BY GOLD CLASSIFICATION (HCC): ICD-10-CM

## 2019-07-29 DIAGNOSIS — R07.9 CHEST PAIN, UNSPECIFIED TYPE: ICD-10-CM

## 2019-07-29 PROCEDURE — 71046 X-RAY EXAM CHEST 2 VIEWS: CPT

## 2019-08-06 DIAGNOSIS — J45.909 ASTHMA DUE TO ENVIRONMENTAL ALLERGIES: ICD-10-CM

## 2019-08-06 RX ORDER — ALBUTEROL SULFATE 90 UG/1
2 AEROSOL, METERED RESPIRATORY (INHALATION) 4 TIMES DAILY PRN
Qty: 1 INHALER | Refills: 11 | Status: SHIPPED | OUTPATIENT
Start: 2019-08-06 | End: 2019-08-29 | Stop reason: SDUPTHER

## 2019-08-13 DIAGNOSIS — J44.9 STAGE 2 MODERATE COPD BY GOLD CLASSIFICATION (HCC): ICD-10-CM
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
## 2019-08-29 ENCOUNTER — OFFICE VISIT (OUTPATIENT)
Dept: PULMONOLOGY | Facility: CLINIC | Age: 44
End: 2019-08-29

## 2019-08-29 VITALS
SYSTOLIC BLOOD PRESSURE: 122 MMHG | HEIGHT: 65 IN | OXYGEN SATURATION: 99 % | BODY MASS INDEX: 22.16 KG/M2 | DIASTOLIC BLOOD PRESSURE: 70 MMHG | HEART RATE: 70 BPM | WEIGHT: 133 LBS

## 2019-08-29 DIAGNOSIS — J45.909 ASTHMA, UNSPECIFIED ASTHMA SEVERITY, UNSPECIFIED WHETHER COMPLICATED, UNSPECIFIED WHETHER PERSISTENT: ICD-10-CM

## 2019-08-29 DIAGNOSIS — F12.20 MODERATE TETRAHYDROCANNABINOL (THC) DEPENDENCE (HCC): ICD-10-CM

## 2019-08-29 DIAGNOSIS — R76.8 ELEVATED IGE LEVEL: ICD-10-CM

## 2019-08-29 DIAGNOSIS — J45.909 ASTHMA DUE TO ENVIRONMENTAL ALLERGIES: ICD-10-CM

## 2019-08-29 DIAGNOSIS — J44.9 STAGE 2 MODERATE COPD BY GOLD CLASSIFICATION (HCC): Primary | ICD-10-CM

## 2019-08-29 PROCEDURE — 99214 OFFICE O/P EST MOD 30 MIN: CPT | Performed by: NURSE PRACTITIONER

## 2019-08-29 RX ORDER — ALBUTEROL SULFATE 90 UG/1
2 AEROSOL, METERED RESPIRATORY (INHALATION) 4 TIMES DAILY PRN
Qty: 1 INHALER | Refills: 11 | Status: SHIPPED | OUTPATIENT
Start: 2019-08-29 | End: 2020-07-08 | Stop reason: SDUPTHER

## 2019-08-29 NOTE — PATIENT INSTRUCTIONS
Umeclidinium; Vilanterol inhalation powder  What is this medicine?  UMECLIDINIUM; VILANTEROL (ue MEK li DIN ee um; vye ROLANDO ter ol) inhalation is a combination of two medicines that decrease inflammation and help to open up the airways of your lungs. It is for chronic obstructive pulmonary disease (COPD), including chronic bronchitis or emphysema. Do NOT use for asthma or an acute asthma attack. Do NOT use for a COPD attack.  This medicine may be used for other purposes; ask your health care provider or pharmacist if you have questions.  COMMON BRAND NAME(S): EJ PADRON  What should I tell my health care provider before I take this medicine?  They need to know if you have any of these conditions:  -bladder problems or difficulty passing urine  -diabetes  -glaucoma  -heart disease or irregular heartbeat  -high blood pressure  -kidney disease  -pheochromocytoma  -prostate disease  -seizures  -thyroid disease  -an unusual or allergic reaction to umeclidinium, vilanterol, lactose, milk proteins, other medicines, foods, dyes, or preservatives  -pregnant or trying to get pregnant  -breast-feeding  How should I use this medicine?  This medicine is inhaled through the mouth. It is used once per day. Follow the directions on the prescription label. Do not use a spacer device with this inhaler. Take your medicine at regular intervals. Do not take your medicine more often than directed. Do not stop taking except on your doctor's advice. Make sure that you are using your inhaler correctly. Ask you doctor or health care provider if you have any questions.  A special MedGuide will be given to you by the pharmacist with each prescription and refill. Be sure to read this information carefully each time.  Talk to your pediatrician regarding the use of this medicine in children. Special care may be needed.  Overdosage: If you think you have taken too much of this medicine contact a poison control center or emergency room at  once.  NOTE: This medicine is only for you. Do not share this medicine with others.  What if I miss a dose?  If you miss a dose, use it as soon as you can. If it is almost time for your next dose, use only that dose and continue with your regular schedule. Do not use double or extra doses.  What may interact with this medicine?  Do not take this medicine with any of the following medications:  -cisapride  -dofetilide  -dronedarone  -MAOIs like Carbex, Eldepryl, Marplan, Nardil, and Parnate  -pimozide  -thioridazine  -ziprasidone  This medicine may also interact with the following medications:  -antihistamines for allergy  -antiviral medicines for HIV or AIDS  -atropine  -beta-blockers like metoprolol and propranolol  -certain medicines for bladder problems like oxybutynin, tolterodine  -certain medicines for depression, anxiety, or psychotic disturbances  -certain medicines for Parkinson's disease like benztropine, trihexyphenidyl  -certain medicines for stomach problems like dicyclomine, hyoscyamine  -certain medicines for travel sickness like scopolamine  -diuretics  -ipratropium  -medicines for colds  -medicines for fungal infections like ketoconazole and itraconazole  -other medicines for breathing problems  -other medicines that prolong the QT interval (cause an abnormal heart rhythm)  -tiotropium  This list may not describe all possible interactions. Give your health care provider a list of all the medicines, herbs, non-prescription drugs, or dietary supplements you use. Also tell them if you smoke, drink alcohol, or use illegal drugs. Some items may interact with your medicine.  What should I watch for while using this medicine?  Visit your doctor or health care professional for regular checkups. Tell your doctor or health care professional if your symptoms do not get better.  If your symptoms get worse or if you need your short-acting inhalers more often, call your doctor right away. Do not use this medicine  more than once every 24 hours.  What side effects may I notice from receiving this medicine?  Side effects that you should report to your doctor or health care professional as soon as possible:  -allergic reactions like skin rash or hives, swelling of the face, lips, or tongue  -breathing problems right after inhaling your medicine  -changes in vision  -chest pain  -fast, irregular heartbeat  -feeling faint or lightheaded, falls  -fever or chills  -nausea, vomiting  -trouble passing urine or change in the amount of urine  Side effects that usually do not require medical attention (report to your doctor or health care professional if they continue or are bothersome):  -constipation  -cough  -diarrhea  -headache  -muscle cramps  -nervousness  -sore throat  -tremor  This list may not describe all possible side effects. Call your doctor for medical advice about side effects. You may report side effects to FDA at 6-503-FDA-6744.  Where should I keep my medicine?  Keep out of the reach of children.  Store at room temperature between 15 and 30 degrees C (59 and 86 degrees F). Store in a dry place away from direct heat or sunlight. Throw away 6 weeks after you remove the inhaler from the foil tray, or after the dose indicator reads 0, whichever comes first. Throw away any unopened packages after the expiration date.  NOTE: This sheet is a summary. It may not cover all possible information. If you have questions about this medicine, talk to your doctor, pharmacist, or health care provider.     © 2017, [employer]. (2017-01-19 08:33:54)

## 2019-09-04 LAB
A ALTERNATA IGE QN: 55.8 KU/L
A FUMIGATUS IGE QN: 6.88 KU/L
BERMUDA GRASS IGE QN: 15.1 KU/L
BOXELDER IGE QN: 5.9 KU/L
C HERBARUM IGE QN: 3.65 KU/L
CAT DANDER IGE QN: 20.7 KU/L
COMMON RAGWEED IGE QN: 6.71 KU/L
CONV CLASS DESCRIPTION: ABNORMAL
COTTONWOOD IGE QN: 6.53 KU/L
D FARINAE IGE QN: 2.26 KU/L
D PTERONYSS IGE QN: 1.69 KU/L
DOG DANDER IGE QN: 7.37 KU/L
IGE SERPL-ACNC: 2301 IU/ML (ref 6–495)
MT JUNIPER IGE QN: 4.36 KU/L
NETTLE IGE QN: 0.82 KU/L
P NOTATUM IGE QN: 0.8 KU/L
ROACH IGE QN: 0.13 KU/L
SALTWORT IGE QN: 4.72 KU/L
SHEEP SORREL IGE QN: 0.95 KU/L
TIMOTHY IGE QN: 76.2 KU/L
WHITE ASH IGE QN: 13.9 KU/L
WHITE ELM IGE QN: 17 KU/L
WHITE MULBERRY IGE QN: <0.1 KU/L
WHITE OAK IGE QN: 14 KU/L

## 2020-02-27 ENCOUNTER — OFFICE VISIT (OUTPATIENT)
Dept: PULMONOLOGY | Facility: CLINIC | Age: 45
End: 2020-02-27

## 2020-02-27 VITALS
SYSTOLIC BLOOD PRESSURE: 120 MMHG | BODY MASS INDEX: 23.32 KG/M2 | OXYGEN SATURATION: 98 % | HEIGHT: 65 IN | WEIGHT: 140 LBS | DIASTOLIC BLOOD PRESSURE: 82 MMHG | HEART RATE: 66 BPM

## 2020-02-27 DIAGNOSIS — F12.20 MODERATE TETRAHYDROCANNABINOL (THC) DEPENDENCE (HCC): ICD-10-CM

## 2020-02-27 DIAGNOSIS — J45.909 ASTHMA DUE TO ENVIRONMENTAL ALLERGIES: ICD-10-CM

## 2020-02-27 DIAGNOSIS — J45.909 ASTHMA, UNSPECIFIED ASTHMA SEVERITY, UNSPECIFIED WHETHER COMPLICATED, UNSPECIFIED WHETHER PERSISTENT: Primary | ICD-10-CM

## 2020-02-27 PROCEDURE — 94010 BREATHING CAPACITY TEST: CPT | Performed by: NURSE PRACTITIONER

## 2020-02-27 PROCEDURE — 99213 OFFICE O/P EST LOW 20 MIN: CPT | Performed by: NURSE PRACTITIONER

## 2020-02-27 RX ORDER — BECLOMETHASONE DIPROPIONATE HFA 80 UG/1
AEROSOL, METERED RESPIRATORY (INHALATION)
COMMUNITY
Start: 2019-11-25 | End: 2020-07-08 | Stop reason: SDUPTHER

## 2020-02-27 NOTE — PROCEDURES
Pulmonary Function Test  Performed by: Mike Forbes APRN  Authorized by: Mike Forbes APRN      Pre Drug    FVC: 97%   FEV1: 89%   FEF 25-75%: 59%   FEV1/FVC: 76.17%

## 2020-02-27 NOTE — PROGRESS NOTES
ABIMBOLA Jackson  [facility]   Respiratory Disease Clinic  [address] [place] [address]  Phone: [phone]  Fax: [phone]     Jose Pace is a 45 y.o. male.   CC:   Chief Complaint   Patient presents with   • COPD     patient did cryotherapy earlier this week.        HPI: He has gotten established with Dr. Tello and is getting allergy shots and is feeling much better.  He had follow-up pulmonary function testing today that shows a tremendous improvement in FEV1 and mid flows.  Initially, I felt that the patient had COPD with asthma overlap and now that he has improved so much I believe this is straight asthma that is aggravated by environmental allergies.  He is using Qvar daily and a rescue inhaler as needed.  He does not take vaccines.  He is followed by Dr. Hardy for routine medical care.    The following portions of the patient's history were reviewed and updated as appropriate: allergies, current medications, past family history, past medical history, past social history, past surgical history and problem list.    Past Medical History:   Diagnosis Date   • Asthma        Family History   Problem Relation Age of Onset   • Heart murmur Mother    • Stroke Mother    • Hypertension Mother        Social History     Socioeconomic History   • Marital status:      Spouse name: Not on file   • Number of children: Not on file   • Years of education: Not on file   • Highest education level: Not on file   Tobacco Use   • Smoking status: Former Smoker     Packs/day: 0.25     Years: 5.00     Pack years: 1.25     Types: Cigarettes     Last attempt to quit: 2009     Years since quittin.1   • Smokeless tobacco: Never Used   Substance and Sexual Activity   • Alcohol use: Yes     Frequency: 2-3 times a week   • Drug use: Yes     Types: Marijuana     Comment: Occ   • Sexual activity: Defer       Review of Systems   Constitutional: Negative for appetite change, chills,  "fatigue and fever.   HENT: Negative for swollen glands, trouble swallowing and voice change.    Eyes: Negative for visual disturbance.   Respiratory: Positive for shortness of breath (With activity). Negative for cough and wheezing.    Cardiovascular: Negative for chest pain and leg swelling.   Gastrointestinal: Negative for abdominal distention, abdominal pain, nausea and vomiting.   Genitourinary: Negative.    Musculoskeletal: Negative for gait problem and myalgias.   Skin: Negative.    Neurological: Negative for weakness.   Hematological: Negative.    Psychiatric/Behavioral: The patient is not nervous/anxious.        /82   Pulse 66   Ht 163.8 cm (64.5\")   Wt 63.5 kg (140 lb)   SpO2 98% Comment: RA  BMI 23.66 kg/m²     Physical Exam   Constitutional: He is oriented to person, place, and time. He appears well-developed and well-nourished. No distress.   HENT:   Head: Normocephalic and atraumatic.   Eyes: Pupils are equal, round, and reactive to light. No scleral icterus.   Neck: Normal range of motion. Neck supple.   Cardiovascular: Normal rate, regular rhythm, S1 normal and S2 normal.   Pulmonary/Chest: Effort normal and breath sounds normal. No tachypnea. He has no wheezes. He has no rhonchi. He has no rales.   Abdominal: Soft. Bowel sounds are normal. He exhibits no distension.   Musculoskeletal: Normal range of motion. He exhibits no edema.   Lymphadenopathy:     He has no cervical adenopathy.   Neurological: He is alert and oriented to person, place, and time.   Skin: Skin is warm and dry. No rash noted. No cyanosis. Nails show no clubbing.   Psychiatric: He has a normal mood and affect. His behavior is normal.   Vitals reviewed.        PFT Values        Some values may be hidden. Unless noted otherwise, only the newest values recorded on each date are displayed.         Old Values PFT Results 7/18/19 2/27/20   FVC 91%    FEV1 75%    FEV1/FVC 68.46    DLCO 82%    TLC 96%       Pre Drug PFT Results " 7/18/19 2/27/20   FVC  97   FEV1  89   FEF 25-75%  59   FEV1/FVC  76.17      Post Drug PFT Results 7/18/19 2/27/20   No data to display.      Other Tests PFT Results 7/18/19 2/27/20   No data to display.           My interpretation of  Pulmonary Functions Testing: Spirometry shows a decrease in FEF max and mid flows.  Actual FEV1/FVC has normalized at 76.17.    Jose was seen today for copd.    Diagnoses and all orders for this visit:    Asthma, unspecified asthma severity, unspecified whether complicated, unspecified whether persistent  -     Pulmonary Function Test    Asthma due to environmental allergies    Moderate tetrahydrocannabinol (THC) dependence (CMS/[facility])      Patient's Body mass index is 23.66 kg/m². BMI is within normal parameters. No follow-up required..      Follow-up/Plan: Continue daily Qvar and use rescue as needed.  Return to clinic in 1 year with flow volume loop.    Mike Forbes, ABIMBOLA  2/27/2020  9:32 AM

## 2020-06-29 ENCOUNTER — TELEPHONE (OUTPATIENT)
Dept: INTERNAL MEDICINE | Facility: CLINIC | Age: 45
End: 2020-06-29

## 2020-06-29 NOTE — TELEPHONE ENCOUNTER
PLEASE CALL PATIENT REGARDING SOME LAB WORK HE WOULD LIKE TO HAVE DONE PRIOR TO HIS APPOINTMENT ON 7/8/20.0

## 2020-06-29 NOTE — TELEPHONE ENCOUNTER
Patient requested an earlier appointment to be evaluated for STI's. He stated he started being sexually active recently after being celibate for 3 years and thinks he may have a STI.     Patient informed we will see him at 11:15 tomorrow morning, per Sanjuanita.

## 2020-06-30 ENCOUNTER — LAB (OUTPATIENT)
Dept: LAB | Facility: HOSPITAL | Age: 45
End: 2020-06-30

## 2020-06-30 ENCOUNTER — OFFICE VISIT (OUTPATIENT)
Dept: INTERNAL MEDICINE | Facility: CLINIC | Age: 45
End: 2020-06-30

## 2020-06-30 VITALS
SYSTOLIC BLOOD PRESSURE: 106 MMHG | DIASTOLIC BLOOD PRESSURE: 68 MMHG | OXYGEN SATURATION: 98 % | BODY MASS INDEX: 22.89 KG/M2 | TEMPERATURE: 97 F | HEIGHT: 65 IN | HEART RATE: 70 BPM | RESPIRATION RATE: 16 BRPM | WEIGHT: 137.4 LBS

## 2020-06-30 DIAGNOSIS — N48.9 PENILE LESION: ICD-10-CM

## 2020-06-30 DIAGNOSIS — J34.89 FRONTAL SINUS PAIN: ICD-10-CM

## 2020-06-30 DIAGNOSIS — N48.9 PENILE LESION: Primary | ICD-10-CM

## 2020-06-30 DIAGNOSIS — Z72.51 HIGH RISK SEXUAL BEHAVIOR, UNSPECIFIED TYPE: ICD-10-CM

## 2020-06-30 DIAGNOSIS — J45.909 ASTHMA DUE TO ENVIRONMENTAL ALLERGIES: ICD-10-CM

## 2020-06-30 LAB
HAV IGM SERPL QL IA: NORMAL
HBV CORE IGM SERPL QL IA: NORMAL
HBV SURFACE AG SERPL QL IA: NORMAL
HCV AB SER DONR QL: NORMAL
RPR SER QL: NORMAL

## 2020-06-30 PROCEDURE — 87661 TRICHOMONAS VAGINALIS AMPLIF: CPT | Performed by: NURSE PRACTITIONER

## 2020-06-30 PROCEDURE — 36415 COLL VENOUS BLD VENIPUNCTURE: CPT

## 2020-06-30 PROCEDURE — 86695 HERPES SIMPLEX TYPE 1 TEST: CPT | Performed by: NURSE PRACTITIONER

## 2020-06-30 PROCEDURE — 86696 HERPES SIMPLEX TYPE 2 TEST: CPT | Performed by: NURSE PRACTITIONER

## 2020-06-30 PROCEDURE — 87591 N.GONORRHOEAE DNA AMP PROB: CPT | Performed by: NURSE PRACTITIONER

## 2020-06-30 PROCEDURE — 99214 OFFICE O/P EST MOD 30 MIN: CPT | Performed by: NURSE PRACTITIONER

## 2020-06-30 PROCEDURE — 86592 SYPHILIS TEST NON-TREP QUAL: CPT | Performed by: NURSE PRACTITIONER

## 2020-06-30 PROCEDURE — G0432 EIA HIV-1/HIV-2 SCREEN: HCPCS | Performed by: NURSE PRACTITIONER

## 2020-06-30 PROCEDURE — 80074 ACUTE HEPATITIS PANEL: CPT | Performed by: NURSE PRACTITIONER

## 2020-06-30 PROCEDURE — 87491 CHLMYD TRACH DNA AMP PROBE: CPT | Performed by: NURSE PRACTITIONER

## 2020-06-30 RX ORDER — DIAPER,BRIEF,INFANT-TODD,DISP
EACH MISCELLANEOUS 2 TIMES DAILY
Qty: 28 G | Refills: 0 | Status: SHIPPED | OUTPATIENT
Start: 2020-06-30 | End: 2020-07-05

## 2020-06-30 NOTE — PROGRESS NOTES
CC: penile lesion, possible exposure to STD    History:  Jose Pace is a 45 y.o. male who presents today for follow-up for evaluation of the above:  Patient presents today with c/o possible exposure to STD and penile lesions. He reports 3 weeks of sexual exposure to new partner. He reports that his sexual partner denies any symptoms. Her reports his symptoms started as sinus congestion and reports he had a lump that was tender in his right axilla.   Thursday started seeing lesions on penis. X4 lesions noted today. Sporatic location on penis. Not clustered. Itching. No papular lesion. Not taking anything otc for this.             ROS:  Review of Systems   HENT: Positive for sinus pressure and sinus pain.    Genitourinary: Positive for genital sores. Negative for discharge and dysuria.       Mr. Pace  reports that he quit smoking about 11 years ago. His smoking use included cigarettes. He has a 1.25 pack-year smoking history. He has never used smokeless tobacco. He reports that he drinks alcohol. He reports that he has current or past drug history. Drug: Marijuana.      Current Outpatient Medications:   •  albuterol (PROVENTIL) (2.5 MG/3ML) 0.083% nebulizer solution, Take 2.5 mg by nebulization Every 4 (Four) Hours As Needed for Wheezing., Disp: 30 vial, Rfl: 0  •  albuterol sulfate  (90 Base) MCG/ACT inhaler, Inhale 2 puffs 4 (Four) Times a Day As Needed for Wheezing or Shortness of Air., Disp: 1 inhaler, Rfl: 11  •  QVAR REDIHALER 80 MCG/ACT inhaler, USE 2 PUFFS BID RINSE MOUTH AFTER USE, Disp: , Rfl:   •  fexofenadine (ALLEGRA) 60 MG tablet, Take 60 mg by mouth Daily., Disp: , Rfl:   •  fluticasone (FLONASE) 50 MCG/ACT nasal spray, 2 sprays into the nostril(s) as directed by provider Daily. 2 puffs each nostril, Disp: 1 bottle, Rfl: 0  •  hydrocortisone 1 % ointment, Apply  topically to the appropriate area as directed 2 (Two) Times a Day for 5 days., Disp: 28 g, Rfl: 0      OBJECTIVE:  /68 (BP  "Location: Left arm, Patient Position: Sitting, Cuff Size: Adult)   Pulse 70   Temp 97 °F (36.1 °C) (Temporal)   Resp 16   Ht 163.8 cm (64.5\")   Wt 62.3 kg (137 lb 6.4 oz)   SpO2 98%   BMI 23.22 kg/m²    Physical Exam   Constitutional: He is oriented to person, place, and time. No distress.   HENT:   Nose: Right sinus exhibits frontal sinus tenderness. Left sinus exhibits frontal sinus tenderness.   Pulmonary/Chest: No respiratory distress.   Genitourinary: No penile tenderness. No discharge found.         Genitourinary Comments: Slightly raised circular lesions. No drainage. One is scabbed over after patient \"popped\" it.      Neurological: He is alert and oriented to person, place, and time.   Psychiatric: He has a normal mood and affect.       Assessment/Plan    Jose was seen today for exposure to std.    Diagnoses and all orders for this visit:    Penile lesion  High risk sexual behavior, unspecified type  -     Hepatitis panel, acute; Future  -     HIV-1/O/2 ANTIGEN/ANTIBODY, 4TH GENERATION; Future  -     HSV 1 and 2 IgM, Abs, Indirect; Future  -     HSV 1 and 2-Specific Ab, IgG; Future  -     RPR; Future  -     Trichomonas vaginalis, PCR - Urine, Urine, Clean Catch; Future  -     Chlamydia trachomatis, Neisseria gonorrhoeae, PCR - Urine, Urine, Clean Catch; Future  Lesions do not appear herpetic. Consistent with scabies bites but not present in partner, no tunneling seen. Areas do itch. To apply hydrocortisone to the areas BID for 5 days. Notify office if not improving.     Asthma due to environmental allergies  Frontal sinus pain  Patient to resume allegra and flonase.        An After Visit Summary was printed and given to the patient at discharge.  Return for Next scheduled follow up. Sooner if problems arise.          Sanjuanita DAILY. 6/30/2020   Electronically Signed  "

## 2020-07-01 LAB
C TRACH RRNA CVX QL NAA+PROBE: NOT DETECTED
HIV1+2 AB SER QL: NORMAL
HOLD SPECIMEN: NORMAL
HSV1 IGG SER IA-ACNC: 1.18 INDEX (ref 0–0.9)
HSV2 IGG SER IA-ACNC: 21.2 INDEX (ref 0–0.9)
N GONORRHOEA RRNA SPEC QL NAA+PROBE: NOT DETECTED
TRICHOMONAS VAGINALIS PCR: NOT DETECTED

## 2020-07-02 ENCOUNTER — TELEPHONE (OUTPATIENT)
Dept: INTERNAL MEDICINE | Facility: CLINIC | Age: 45
End: 2020-07-02

## 2020-07-02 LAB
HSV1 IGM TITR SER IF: NORMAL TITER
HSV2 IGM TITR SER IF: NORMAL TITER

## 2020-07-02 NOTE — TELEPHONE ENCOUNTER
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  Test to see if currently active is still pending at this time. All other std results are negative.   Will update him as soon as results available.

## 2020-07-08 ENCOUNTER — OFFICE VISIT (OUTPATIENT)
Dept: INTERNAL MEDICINE | Facility: CLINIC | Age: 45
End: 2020-07-08

## 2020-07-08 VITALS
RESPIRATION RATE: 16 BRPM | HEIGHT: 65 IN | OXYGEN SATURATION: 99 % | HEART RATE: 66 BPM | BODY MASS INDEX: 23.16 KG/M2 | SYSTOLIC BLOOD PRESSURE: 116 MMHG | DIASTOLIC BLOOD PRESSURE: 78 MMHG | WEIGHT: 139 LBS

## 2020-07-08 DIAGNOSIS — R53.83 FATIGUE, UNSPECIFIED TYPE: ICD-10-CM

## 2020-07-08 DIAGNOSIS — L65.9 HAIR LOSS: ICD-10-CM

## 2020-07-08 DIAGNOSIS — J45.909 ASTHMA DUE TO ENVIRONMENTAL ALLERGIES: ICD-10-CM

## 2020-07-08 DIAGNOSIS — Z00.01 ENCOUNTER FOR ANNUAL GENERAL MEDICAL EXAMINATION WITH ABNORMAL FINDINGS IN ADULT: Primary | ICD-10-CM

## 2020-07-08 DIAGNOSIS — R89.4 HERPES SIMPLEX ANTIBODY POSITIVE: ICD-10-CM

## 2020-07-08 PROCEDURE — 99396 PREV VISIT EST AGE 40-64: CPT | Performed by: NURSE PRACTITIONER

## 2020-07-08 PROCEDURE — 99214 OFFICE O/P EST MOD 30 MIN: CPT | Performed by: NURSE PRACTITIONER

## 2020-07-08 RX ORDER — FLUTICASONE PROPIONATE 50 MCG
2 SPRAY, SUSPENSION (ML) NASAL DAILY
Qty: 1 BOTTLE | Refills: 3 | Status: SHIPPED | OUTPATIENT
Start: 2020-07-08 | End: 2021-07-20 | Stop reason: SDUPTHER

## 2020-07-08 RX ORDER — BECLOMETHASONE DIPROPIONATE HFA 80 UG/1
2 AEROSOL, METERED RESPIRATORY (INHALATION)
Qty: 10.6 G | Refills: 11 | Status: SHIPPED | OUTPATIENT
Start: 2020-07-08 | End: 2021-08-16

## 2020-07-08 RX ORDER — FEXOFENADINE HYDROCHLORIDE 60 MG/1
60 TABLET, FILM COATED ORAL DAILY
Qty: 90 TABLET | Refills: 3 | Status: SHIPPED | OUTPATIENT
Start: 2020-07-08 | End: 2021-02-18

## 2020-07-08 RX ORDER — ALBUTEROL SULFATE 90 UG/1
2 AEROSOL, METERED RESPIRATORY (INHALATION) 4 TIMES DAILY PRN
Qty: 1 INHALER | Refills: 11 | Status: SHIPPED | OUTPATIENT
Start: 2020-07-08 | End: 2020-08-05

## 2020-07-08 NOTE — PROGRESS NOTES
CC: annual exam, f/u penile lesions    History:  Jose Pace is a 45 y.o. male who presents today for follow-up for evaluation of the above:  Patient presents today for annual exam. He reports that he has been feeling more fatigue and run down lately. The lesions on his penis are still present though he reports he did not  the cream recommended until yesterday.  He continues on albuterol for asthma PRN.       ROS:  Review of Systems   Constitutional: Negative for activity change, appetite change, fatigue, fever and unexpected weight change.   HENT: Negative.    Respiratory: Negative for cough, chest tightness, shortness of breath and wheezing.    Cardiovascular: Negative for chest pain, palpitations and leg swelling.   Gastrointestinal: Negative.    Endocrine: Negative.    Genitourinary: Positive for penile pain (lesions that itch).   Musculoskeletal: Negative.    Skin: Negative.    Neurological: Negative for dizziness and headaches.   Psychiatric/Behavioral: Negative.        Mr. Pace  reports that he quit smoking about 11 years ago. His smoking use included cigarettes. He has a 1.25 pack-year smoking history. He has never used smokeless tobacco. He reports that he drinks alcohol. He reports that he has current or past drug history. Drug: Marijuana.      Current Outpatient Medications:   •  albuterol (PROVENTIL) (2.5 MG/3ML) 0.083% nebulizer solution, Take 2.5 mg by nebulization Every 4 (Four) Hours As Needed for Wheezing., Disp: 30 vial, Rfl: 0  •  albuterol sulfate  (90 Base) MCG/ACT inhaler, Inhale 2 puffs 4 (Four) Times a Day As Needed for Wheezing or Shortness of Air., Disp: 1 inhaler, Rfl: 11  •  fexofenadine (ALLEGRA) 60 MG tablet, Take 1 tablet by mouth Daily., Disp: 90 tablet, Rfl: 3  •  fluticasone (Flonase) 50 MCG/ACT nasal spray, 2 sprays into the nostril(s) as directed by provider Daily. 2 puffs each nostril, Disp: 1 bottle, Rfl: 3  •  QVAR REDIHALER 80 MCG/ACT inhaler, Inhale 2  "puffs 2 (Two) Times a Day., Disp: 10.6 g, Rfl: 11      OBJECTIVE:  /78 (BP Location: Left arm, Patient Position: Sitting, Cuff Size: Adult)   Pulse 66   Resp 16   Ht 163.8 cm (64.5\")   Wt 63 kg (139 lb)   SpO2 99%   BMI 23.49 kg/m²    Physical Exam   Constitutional: He is oriented to person, place, and time. He appears well-developed and well-nourished.   HENT:   Head: Normocephalic and atraumatic.   Eyes: Pupils are equal, round, and reactive to light. Conjunctivae and EOM are normal.   Neck: Normal range of motion. Neck supple.   Cardiovascular: Normal rate, regular rhythm and normal heart sounds.   Pulmonary/Chest: Effort normal and breath sounds normal.   Abdominal: Soft. Bowel sounds are normal.   Neurological: He is alert and oriented to person, place, and time. He has normal reflexes.   Skin: Skin is warm and dry.   Psychiatric: He has a normal mood and affect.   Vitals reviewed.      Assessment/Plan    Jose was seen today for annual exam and labs only.    Diagnoses and all orders for this visit:    Encounter for annual general medical examination with abnormal findings in adult  -     Lipid panel; Future  -     Comprehensive metabolic panel; Future  -     CBC w AUTO Differential; Future  Immunizations:      - Tetanus: Unknown or >10 years ago. Recommend to have at pharmacy or on injury.      - Influenza: Recommend yearly.      - Pneumovax: Once after age 65      - Prevnar: Once after age 65      - Zostavax: Once after age 60  Colonoscopy: Discuss at 50  Prostate: Discuss at 55 or on symptoms.    Asthma due to environmental allergies  -     albuterol sulfate  (90 Base) MCG/ACT inhaler; Inhale 2 puffs 4 (Four) Times a Day As Needed for Wheezing or Shortness of Air.  -     fexofenadine (ALLEGRA) 60 MG tablet; Take 1 tablet by mouth Daily.  -     QVAR REDIHALER 80 MCG/ACT inhaler; Inhale 2 puffs 2 (Two) Times a Day.    Hair loss  -     TSH; Future  -     Testosterone; Future    Fatigue, " unspecified type  -     Testosterone; Future    Herpes simplex antibody positive  Discussion about STD screening. encouraged to always wear a barrier method with intercourse. Lesions are not herpetic.          An After Visit Summary was printed and given to the patient at discharge.  Return in about 1 year (around 7/8/2021). Sooner if problems arise.          Sanjuanita Canales APRN. 7/8/2020   Electronically Signed

## 2020-07-08 NOTE — PATIENT INSTRUCTIONS
Cold Sore    A cold sore, also called a fever blister, is a small, fluid-filled sore that forms inside the mouth or on the lips, gums, nose, chin, or cheeks. Cold sores can spread to other parts of the body, such as the eyes or fingers. In some people who have other medical conditions, cold sores can spread to multiple other body sites, including the genitals.  Cold sores can spread from person to person (are contagious) until the sores crust over completely. Most cold sores go away within 2 weeks.  What are the causes?  Cold sores are caused by an infection from a common type of herpes simplex virus (HSV-1). HSV-1 is closely related to the HSV-2virus, which is the virus that causes genital herpes, but these viruses are not the same. Once a person is infected with HSV-1, the virus remains permanently in the body.  HSV-1 is spread from person to person through close contact, such as through kissing, touching the affected area, or sharing personal items such as lip balm, razors, a drinking glass, or eating utensils.  What increases the risk?  You are more likely to develop this condition if you:  · Are tired, stressed, or sick.  · Are menstruating.  · Are pregnant.  · Take certain medicines.  · Are exposed to cold weather or too much sun.  What are the signs or symptoms?  Symptoms of a cold sore outbreak go through different stages. These are the stages of a cold sore:  · Tingling, itching, or burning is felt 1-2 days before the outbreak.  · Fluid-filled blisters appear on the lips, inside the mouth, on the nose, or on the cheeks.  · The blisters start to ooze clear fluid.  · The blisters dry up, and a yellow crust appears in their place.  · The crust falls off.  In some cases, other symptoms can develop during a cold sore outbreak. These can include:  · Fever.  · Sore throat.  · Headache.  · Muscle aches.  · Swollen neck glands.  How is this diagnosed?  This condition is diagnosed based on your medical history and a  physical exam. Your health care provider may do a blood test or may swab some fluid from your sore and then examine the swab in the lab.  How is this treated?  There is no cure for cold sores or HSV-1. There is also no vaccine for HSV-1. Most cold sores go away on their own without treatment within 2 weeks. Medicines cannot make the infection go away, but your health care provider may prescribe medicines to:  · Help relieve some of the pain associated with the sores.  · Work to stop the virus from multiplying.  · Shorten healing time.  Medicines may be in the form of creams, gels, pills, or a shot.  Follow these instructions at home:  Medicines  · Take or apply over-the-counter and prescription medicines only as told by your health care provider.  · Use a cotton-tip swab to apply creams or gels to your sores.  · Ask your health care provider if you can take lysine supplements. Research has found that lysine may help heal the cold sore faster and prevent outbreaks.  Sore care    · Do not touch the sores or pick the scabs.  · Wash your hands often. Do not touch your eyes without washing your hands first.  · Keep the sores clean and dry.  · If directed, apply ice to the sores:  ? Put ice in a plastic bag.  ? Place a towel between your skin and the bag.  ? Leave the ice on for 20 minutes, 2-3 times a day.  Eating and drinking  · Eat a soft, bland diet. Avoid eating hot, cold, or salty foods.  · Use a straw if it hurts to drink out of a glass.  · Eat foods that are rich in lysine, such as meat, fish, and dairy products.  · Avoid sugary foods, chocolates, nuts, and grains. These foods are rich in a nutrient called arginine, which can cause the virus to multiply.  Lifestyle  · Do not kiss, have oral sex, or share personal items until your sores heal.  · Stress, poor sleep, and being out in the sun can trigger outbreaks. Make sure you:  ? Do activities that help you relax, such as deep breathing exercises or  meditation.  ? Get enough sleep.  ? Apply sunscreen on your lips before you go out in the sun.  Contact a health care provider if:  · You have symptoms for more than 2 weeks.  · You have pus coming from the sores.  · You have redness that is spreading.  · You have pain or irritation in your eye.  · You get sores on your genitals.  · Your sores do not heal within 2 weeks.  · You have frequent cold sore outbreaks.  Get help right away if you have:  · A fever and your symptoms suddenly get worse.  · A headache and confusion.  · Fatigue or loss of appetite.  · A stiff neck or sensitivity to light.  Summary  · A cold sore, also called a fever blister, is a small, fluid-filled sore that forms inside the mouth or on the lips, gums, nose, chin, or cheeks.  · Most cold sores go away on their own without treatment within 2 weeks. Your health care provider may prescribe medicines to help relieve some of the pain, work to stop the virus from multiplying, and shorten healing time.  · Wash your hands often. Do not touch your eyes without washing your hands first.  · Do not kiss, have oral sex, or share personal items until your sores heal.  · Contact a health care provider if your sores do not heal within 2 weeks.  This information is not intended to replace advice given to you by your health care provider. Make sure you discuss any questions you have with your health care provider.  Document Released: 12/15/2001 Document Revised: 04/08/2020 Document Reviewed: 05/20/2019  Elsevier Patient Education © 2020 [employer]

## 2020-08-01 ENCOUNTER — LAB (OUTPATIENT)
Dept: LAB | Facility: HOSPITAL | Age: 45
End: 2020-08-01

## 2020-08-01 DIAGNOSIS — R53.83 FATIGUE, UNSPECIFIED TYPE: ICD-10-CM

## 2020-08-01 DIAGNOSIS — Z00.01 ENCOUNTER FOR ANNUAL GENERAL MEDICAL EXAMINATION WITH ABNORMAL FINDINGS IN ADULT: ICD-10-CM

## 2020-08-01 DIAGNOSIS — L65.9 HAIR LOSS: ICD-10-CM

## 2020-08-01 LAB
ALBUMIN SERPL-MCNC: 4.5 G/DL (ref 3.5–5.2)
ALBUMIN/GLOB SERPL: 1.8 G/DL
ALP SERPL-CCNC: 56 U/L (ref 39–117)
ALT SERPL W P-5'-P-CCNC: 20 U/L (ref 1–41)
ANION GAP SERPL CALCULATED.3IONS-SCNC: 10.3 MMOL/L (ref 5–15)
AST SERPL-CCNC: 23 U/L (ref 1–40)
BASOPHILS # BLD AUTO: 0.02 10*3/MM3 (ref 0–0.2)
BASOPHILS NFR BLD AUTO: 0.2 % (ref 0–1.5)
BILIRUB SERPL-MCNC: 0.7 MG/DL (ref 0–1.2)
BUN SERPL-MCNC: 14 MG/DL (ref 6–20)
BUN/CREAT SERPL: 10.8 (ref 7–25)
CALCIUM SPEC-SCNC: 9.2 MG/DL (ref 8.6–10.5)
CHLORIDE SERPL-SCNC: 104 MMOL/L (ref 98–107)
CHOLEST SERPL-MCNC: 161 MG/DL (ref 0–200)
CO2 SERPL-SCNC: 26.7 MMOL/L (ref 22–29)
CREAT SERPL-MCNC: 1.3 MG/DL (ref 0.76–1.27)
DEPRECATED RDW RBC AUTO: 39.2 FL (ref 37–54)
EOSINOPHIL # BLD AUTO: 0 10*3/MM3 (ref 0–0.4)
EOSINOPHIL NFR BLD AUTO: 0 % (ref 0.3–6.2)
ERYTHROCYTE [DISTWIDTH] IN BLOOD BY AUTOMATED COUNT: 11.9 % (ref 12.3–15.4)
GFR SERPL CREATININE-BSD FRML MDRD: 73 ML/MIN/1.73
GLOBULIN UR ELPH-MCNC: 2.5 GM/DL
GLUCOSE SERPL-MCNC: 81 MG/DL (ref 65–99)
HCT VFR BLD AUTO: 44.4 % (ref 37.5–51)
HDLC SERPL-MCNC: 57 MG/DL (ref 40–60)
HGB BLD-MCNC: 13.8 G/DL (ref 13–17.7)
IMM GRANULOCYTES # BLD AUTO: 0.03 10*3/MM3 (ref 0–0.05)
IMM GRANULOCYTES NFR BLD AUTO: 0.4 % (ref 0–0.5)
LDLC SERPL CALC-MCNC: 91 MG/DL (ref 0–100)
LDLC/HDLC SERPL: 1.6 {RATIO}
LYMPHOCYTES # BLD AUTO: 2.34 10*3/MM3 (ref 0.7–3.1)
LYMPHOCYTES NFR BLD AUTO: 27.6 % (ref 19.6–45.3)
MCH RBC QN AUTO: 28.1 PG (ref 26.6–33)
MCHC RBC AUTO-ENTMCNC: 31.1 G/DL (ref 31.5–35.7)
MCV RBC AUTO: 90.4 FL (ref 79–97)
MONOCYTES # BLD AUTO: 0.79 10*3/MM3 (ref 0.1–0.9)
MONOCYTES NFR BLD AUTO: 9.3 % (ref 5–12)
NEUTROPHILS NFR BLD AUTO: 5.31 10*3/MM3 (ref 1.7–7)
NEUTROPHILS NFR BLD AUTO: 62.5 % (ref 42.7–76)
NRBC BLD AUTO-RTO: 0 /100 WBC (ref 0–0.2)
PLATELET # BLD AUTO: 213 10*3/MM3 (ref 140–450)
PMV BLD AUTO: 10.6 FL (ref 6–12)
POTASSIUM SERPL-SCNC: 4.2 MMOL/L (ref 3.5–5.2)
PROT SERPL-MCNC: 7 G/DL (ref 6–8.5)
RBC # BLD AUTO: 4.91 10*6/MM3 (ref 4.14–5.8)
SODIUM SERPL-SCNC: 141 MMOL/L (ref 136–145)
TESTOST SERPL-MCNC: 426 NG/DL (ref 249–836)
TRIGL SERPL-MCNC: 63 MG/DL (ref 0–150)
VLDLC SERPL-MCNC: 12.6 MG/DL (ref 5–40)
WBC # BLD AUTO: 8.49 10*3/MM3 (ref 3.4–10.8)

## 2020-08-01 PROCEDURE — 85025 COMPLETE CBC W/AUTO DIFF WBC: CPT | Performed by: NURSE PRACTITIONER

## 2020-08-01 PROCEDURE — 80053 COMPREHEN METABOLIC PANEL: CPT | Performed by: NURSE PRACTITIONER

## 2020-08-01 PROCEDURE — 84403 ASSAY OF TOTAL TESTOSTERONE: CPT | Performed by: NURSE PRACTITIONER

## 2020-08-01 PROCEDURE — 80061 LIPID PANEL: CPT | Performed by: NURSE PRACTITIONER

## 2020-08-05 DIAGNOSIS — J45.909 ASTHMA DUE TO ENVIRONMENTAL ALLERGIES: ICD-10-CM

## 2020-08-05 RX ORDER — ALBUTEROL SULFATE 90 UG/1
AEROSOL, METERED RESPIRATORY (INHALATION)
Qty: 1 INHALER | Refills: 11 | Status: SHIPPED | OUTPATIENT
Start: 2020-08-05 | End: 2021-07-16 | Stop reason: SDUPTHER

## 2020-08-18 ENCOUNTER — APPOINTMENT (OUTPATIENT)
Dept: GENERAL RADIOLOGY | Facility: HOSPITAL | Age: 45
End: 2020-08-18

## 2020-08-18 ENCOUNTER — APPOINTMENT (OUTPATIENT)
Dept: CT IMAGING | Facility: HOSPITAL | Age: 45
End: 2020-08-18

## 2020-08-18 ENCOUNTER — HOSPITAL ENCOUNTER (EMERGENCY)
Facility: HOSPITAL | Age: 45
Discharge: HOME | End: 2020-08-18
Attending: EMERGENCY MEDICINE | Admitting: EMERGENCY MEDICINE

## 2020-08-18 VITALS
OXYGEN SATURATION: 99 % | TEMPERATURE: 98.1 F | BODY MASS INDEX: 23.32 KG/M2 | WEIGHT: 140 LBS | HEIGHT: 65 IN | HEART RATE: 66 BPM | DIASTOLIC BLOOD PRESSURE: 68 MMHG | RESPIRATION RATE: 18 BRPM | SYSTOLIC BLOOD PRESSURE: 126 MMHG

## 2020-08-18 DIAGNOSIS — S39.012A BACK STRAIN, INITIAL ENCOUNTER: ICD-10-CM
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
## 2020-08-18 DIAGNOSIS — S16.1XXA STRAIN OF NECK MUSCLE, INITIAL ENCOUNTER: ICD-10-CM

## 2020-08-18 DIAGNOSIS — T14.8XXA AVULSION FRACTURE: ICD-10-CM

## 2020-08-18 PROCEDURE — 71045 X-RAY EXAM CHEST 1 VIEW: CPT

## 2020-08-18 PROCEDURE — 72131 CT LUMBAR SPINE W/O DYE: CPT

## 2020-08-18 PROCEDURE — 72128 CT CHEST SPINE W/O DYE: CPT

## 2020-08-18 PROCEDURE — 90715 TDAP VACCINE 7 YRS/> IM: CPT | Performed by: EMERGENCY MEDICINE

## 2020-08-18 PROCEDURE — 99284 EMERGENCY DEPT VISIT MOD MDM: CPT

## 2020-08-18 PROCEDURE — 73080 X-RAY EXAM OF ELBOW: CPT

## 2020-08-18 PROCEDURE — 70450 CT HEAD/BRAIN W/O DYE: CPT

## 2020-08-18 PROCEDURE — 25010000002 TDAP 5-2.5-18.5 LF-MCG/0.5 SUSPENSION: Performed by: EMERGENCY MEDICINE

## 2020-08-18 PROCEDURE — 73090 X-RAY EXAM OF FOREARM: CPT

## 2020-08-18 PROCEDURE — 72125 CT NECK SPINE W/O DYE: CPT

## 2020-08-18 PROCEDURE — 90471 IMMUNIZATION ADMIN: CPT | Performed by: EMERGENCY MEDICINE

## 2020-08-18 RX ORDER — OXYCODONE AND ACETAMINOPHEN 7.5; 325 MG/1; MG/1
1 TABLET ORAL ONCE
Status: COMPLETED | OUTPATIENT
Start: 2020-08-18 | End: 2020-08-18

## 2020-08-18 RX ORDER — HYDROCODONE BITARTRATE AND ACETAMINOPHEN 7.5; 325 MG/1; MG/1
1 TABLET ORAL EVERY 8 HOURS PRN
Qty: 10 TABLET | Refills: 0 | Status: SHIPPED | OUTPATIENT
Start: 2020-08-18 | End: 2020-11-19

## 2020-08-18 RX ADMIN — TETANUS TOXOID, REDUCED DIPHTHERIA TOXOID AND ACELLULAR PERTUSSIS VACCINE, ADSORBED 0.5 ML: 5; 2.5; 8; 8; 2.5 SUSPENSION INTRAMUSCULAR at 16:44

## 2020-08-18 RX ADMIN — OXYCODONE HYDROCHLORIDE AND ACETAMINOPHEN 1 TABLET: 7.5; 325 TABLET ORAL at 16:44
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
## 2020-08-19 ENCOUNTER — LAB (OUTPATIENT)
Dept: LAB | Facility: HOSPITAL | Age: 45
End: 2020-08-19

## 2020-08-19 ENCOUNTER — OFFICE VISIT (OUTPATIENT)
Dept: INTERNAL MEDICINE | Facility: CLINIC | Age: 45
End: 2020-08-19

## 2020-08-19 VITALS
RESPIRATION RATE: 16 BRPM | BODY MASS INDEX: 23.49 KG/M2 | SYSTOLIC BLOOD PRESSURE: 116 MMHG | HEART RATE: 60 BPM | WEIGHT: 141 LBS | DIASTOLIC BLOOD PRESSURE: 72 MMHG | HEIGHT: 65 IN

## 2020-08-19 DIAGNOSIS — M54.9 ACUTE MIDLINE BACK PAIN, UNSPECIFIED BACK LOCATION: ICD-10-CM

## 2020-08-19 DIAGNOSIS — M54.2 ACUTE NECK PAIN: ICD-10-CM

## 2020-08-19 DIAGNOSIS — R55 SYNCOPE, UNSPECIFIED SYNCOPE TYPE: ICD-10-CM

## 2020-08-19 DIAGNOSIS — T14.8XXA AVULSION FRACTURE: ICD-10-CM
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
## 2020-08-19 LAB
ALBUMIN SERPL-MCNC: 4.3 G/DL (ref 3.5–5.2)
ALBUMIN/GLOB SERPL: 1.8 G/DL
ALP SERPL-CCNC: 57 U/L (ref 39–117)
ALT SERPL W P-5'-P-CCNC: 27 U/L (ref 1–41)
ANION GAP SERPL CALCULATED.3IONS-SCNC: 14.5 MMOL/L (ref 5–15)
AST SERPL-CCNC: 27 U/L (ref 1–40)
BILIRUB SERPL-MCNC: 1 MG/DL (ref 0–1.2)
BUN SERPL-MCNC: 17 MG/DL (ref 6–20)
BUN/CREAT SERPL: 10.6 (ref 7–25)
CALCIUM SPEC-SCNC: 9.1 MG/DL (ref 8.6–10.5)
CHLORIDE SERPL-SCNC: 104 MMOL/L (ref 98–107)
CO2 SERPL-SCNC: 23.5 MMOL/L (ref 22–29)
CREAT SERPL-MCNC: 1.61 MG/DL (ref 0.76–1.27)
DEPRECATED RDW RBC AUTO: 36.5 FL (ref 37–54)
ERYTHROCYTE [DISTWIDTH] IN BLOOD BY AUTOMATED COUNT: 11.4 % (ref 12.3–15.4)
GFR SERPL CREATININE-BSD FRML MDRD: 57 ML/MIN/1.73
GLOBULIN UR ELPH-MCNC: 2.4 GM/DL
GLUCOSE SERPL-MCNC: 90 MG/DL (ref 65–99)
HCT VFR BLD AUTO: 43 % (ref 37.5–51)
HGB BLD-MCNC: 14.4 G/DL (ref 13–17.7)
IRON 24H UR-MRATE: 47 MCG/DL (ref 59–158)
IRON SATN MFR SERPL: 14 % (ref 20–50)
MCH RBC QN AUTO: 29.6 PG (ref 26.6–33)
MCHC RBC AUTO-ENTMCNC: 33.5 G/DL (ref 31.5–35.7)
MCV RBC AUTO: 88.5 FL (ref 79–97)
PLATELET # BLD AUTO: 210 10*3/MM3 (ref 140–450)
PMV BLD AUTO: 12.3 FL (ref 6–12)
POTASSIUM SERPL-SCNC: 3.9 MMOL/L (ref 3.5–5.2)
PROT SERPL-MCNC: 6.7 G/DL (ref 6–8.5)
RBC # BLD AUTO: 4.86 10*6/MM3 (ref 4.14–5.8)
SODIUM SERPL-SCNC: 142 MMOL/L (ref 136–145)
TIBC SERPL-MCNC: 328 MCG/DL (ref 298–536)
TRANSFERRIN SERPL-MCNC: 220 MG/DL (ref 200–360)
TSH SERPL DL<=0.05 MIU/L-ACNC: 0.97 UIU/ML (ref 0.27–4.2)
WBC # BLD AUTO: 13.81 10*3/MM3 (ref 3.4–10.8)

## 2020-08-19 PROCEDURE — 83540 ASSAY OF IRON: CPT | Performed by: NURSE PRACTITIONER

## 2020-08-19 PROCEDURE — 84466 ASSAY OF TRANSFERRIN: CPT | Performed by: NURSE PRACTITIONER

## 2020-08-19 PROCEDURE — 80053 COMPREHEN METABOLIC PANEL: CPT | Performed by: NURSE PRACTITIONER

## 2020-08-19 PROCEDURE — 36415 COLL VENOUS BLD VENIPUNCTURE: CPT

## 2020-08-19 PROCEDURE — 85027 COMPLETE CBC AUTOMATED: CPT | Performed by: NURSE PRACTITIONER

## 2020-08-19 PROCEDURE — 84443 ASSAY THYROID STIM HORMONE: CPT | Performed by: NURSE PRACTITIONER

## 2020-08-19 PROCEDURE — 99214 OFFICE O/P EST MOD 30 MIN: CPT | Performed by: NURSE PRACTITIONER
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
## 2020-09-04 ENCOUNTER — TELEPHONE (OUTPATIENT)
Dept: INTERNAL MEDICINE | Facility: CLINIC | Age: 45
End: 2020-09-04

## 2020-09-04 NOTE — TELEPHONE ENCOUNTER
PATIENT WAS CALLED AND STATED THAT HE WAS EVALUATED FOR HIS HIP AT PHYSICAL THERAPY.  HE  IS JUST NEEDING A NOTE STATING THAT HE HAS BEEN OFF SINCE 08/18/20.  THIS IS FOR HIS WORK.
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
## 2020-09-04 NOTE — TELEPHONE ENCOUNTER
I cannot give him a note for being off work related to something we have never evaluated. If he was evaluated at PT or by [facility] for this, he could seek there. Otherwise, we would need to see him in order to be able to comment on his ability or inability to work.

## 2020-09-04 NOTE — TELEPHONE ENCOUNTER
He has never been evaluated for his hip. He had elbow pain and also reported some back pain. We would need to be able to evaluate before we can provide any documentation for work.

## 2020-11-19 ENCOUNTER — OFFICE VISIT (OUTPATIENT)
Dept: INTERNAL MEDICINE | Facility: CLINIC | Age: 45
End: 2020-11-19

## 2020-11-19 VITALS
DIASTOLIC BLOOD PRESSURE: 82 MMHG | HEIGHT: 65 IN | RESPIRATION RATE: 16 BRPM | SYSTOLIC BLOOD PRESSURE: 122 MMHG | BODY MASS INDEX: 24.41 KG/M2 | TEMPERATURE: 97.8 F | OXYGEN SATURATION: 98 % | HEART RATE: 92 BPM | WEIGHT: 146.5 LBS

## 2020-11-19 DIAGNOSIS — R89.4 POSITIVE TEST FOR HERPES SIMPLEX VIRUS (HSV) ANTIBODY: ICD-10-CM

## 2020-11-19 DIAGNOSIS — J45.909 ASTHMA DUE TO ENVIRONMENTAL ALLERGIES: Primary | ICD-10-CM

## 2020-11-19 PROCEDURE — 99213 OFFICE O/P EST LOW 20 MIN: CPT | Performed by: INTERNAL MEDICINE
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
## 2021-02-24 ENCOUNTER — OFFICE VISIT (OUTPATIENT)
Dept: PULMONOLOGY | Facility: CLINIC | Age: 46
End: 2021-02-24

## 2021-02-24 VITALS
HEART RATE: 62 BPM | DIASTOLIC BLOOD PRESSURE: 82 MMHG | HEIGHT: 65 IN | BODY MASS INDEX: 23.32 KG/M2 | TEMPERATURE: 97.5 F | SYSTOLIC BLOOD PRESSURE: 105 MMHG | OXYGEN SATURATION: 98 % | WEIGHT: 140 LBS

## 2021-02-24 DIAGNOSIS — R76.8 ELEVATED IGE LEVEL: ICD-10-CM

## 2021-02-24 DIAGNOSIS — F12.20 MODERATE TETRAHYDROCANNABINOL (THC) DEPENDENCE (HCC): ICD-10-CM

## 2021-02-24 DIAGNOSIS — J45.909 ASTHMA DUE TO ENVIRONMENTAL ALLERGIES: Primary | Chronic | ICD-10-CM

## 2021-02-24 PROCEDURE — 99213 OFFICE O/P EST LOW 20 MIN: CPT | Performed by: NURSE PRACTITIONER
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
## 2021-07-16 DIAGNOSIS — J45.909 ASTHMA DUE TO ENVIRONMENTAL ALLERGIES: ICD-10-CM

## 2021-07-16 RX ORDER — ALBUTEROL SULFATE 90 UG/1
2 AEROSOL, METERED RESPIRATORY (INHALATION) 4 TIMES DAILY PRN
Qty: 18 G | Refills: 11 | Status: SHIPPED | OUTPATIENT
Start: 2021-07-16 | End: 2022-07-13 | Stop reason: SDUPTHER

## 2021-07-20 ENCOUNTER — OFFICE VISIT (OUTPATIENT)
Dept: INTERNAL MEDICINE | Facility: CLINIC | Age: 46
End: 2021-07-20

## 2021-07-20 VITALS
DIASTOLIC BLOOD PRESSURE: 80 MMHG | OXYGEN SATURATION: 98 % | BODY MASS INDEX: 23.24 KG/M2 | RESPIRATION RATE: 16 BRPM | WEIGHT: 139.5 LBS | TEMPERATURE: 97.6 F | SYSTOLIC BLOOD PRESSURE: 120 MMHG | HEIGHT: 65 IN | HEART RATE: 51 BPM

## 2021-07-20 DIAGNOSIS — Z00.01 ANNUAL VISIT FOR GENERAL ADULT MEDICAL EXAMINATION WITH ABNORMAL FINDINGS: Primary | ICD-10-CM

## 2021-07-20 DIAGNOSIS — Z91.030 ALLERGY TO BEE STING: ICD-10-CM

## 2021-07-20 DIAGNOSIS — J45.909 ASTHMA DUE TO ENVIRONMENTAL ALLERGIES: ICD-10-CM

## 2021-07-20 DIAGNOSIS — R39.198 DECREASED URINE STREAM: ICD-10-CM

## 2021-07-20 DIAGNOSIS — J30.2 SEASONAL ALLERGIES: ICD-10-CM

## 2021-07-20 PROCEDURE — 99396 PREV VISIT EST AGE 40-64: CPT | Performed by: INTERNAL MEDICINE

## 2021-07-20 PROCEDURE — 99213 OFFICE O/P EST LOW 20 MIN: CPT | Performed by: INTERNAL MEDICINE

## 2021-07-20 RX ORDER — AZELASTINE 1 MG/ML
2 SPRAY, METERED NASAL 2 TIMES DAILY
Qty: 30 ML | Refills: 3 | Status: SHIPPED | OUTPATIENT
Start: 2021-07-20

## 2021-07-20 RX ORDER — TIOTROPIUM BROMIDE 18 UG/1
1 CAPSULE ORAL; RESPIRATORY (INHALATION) DAILY
COMMUNITY
Start: 2021-07-13

## 2021-07-20 RX ORDER — IPRATROPIUM/ALBUTEROL SULFATE 20-100 MCG
1 MIST INHALER (GRAM) INHALATION DAILY
Qty: 4 G | Refills: 11 | Status: SHIPPED | OUTPATIENT
Start: 2021-07-20 | End: 2021-12-30

## 2021-07-20 RX ORDER — EPINEPHRINE 0.3 MG/.3ML
0.3 INJECTION SUBCUTANEOUS ONCE
Qty: 1 EACH | Refills: 0 | Status: SHIPPED | OUTPATIENT
Start: 2021-07-20 | End: 2021-07-20

## 2021-07-20 RX ORDER — FLUTICASONE PROPIONATE 50 MCG
2 SPRAY, SUSPENSION (ML) NASAL DAILY
Qty: 16 G | Refills: 3 | Status: SHIPPED | OUTPATIENT
Start: 2021-07-20 | End: 2022-08-11 | Stop reason: SDUPTHER

## 2021-07-20 RX ORDER — IPRATROPIUM/ALBUTEROL SULFATE 20-100 MCG
1 MIST INHALER (GRAM) INHALATION DAILY
COMMUNITY
Start: 2021-07-10 | End: 2021-07-20 | Stop reason: SDUPTHER

## 2021-07-20 NOTE — PROGRESS NOTES
CC: f/u preventive health AND allergy to bee sting    History:  Jose Pace is a 46 y.o. male who presents today for evaluation of the above problems.  He notes he has been doing reasonably well and has had ongoing issues with allergies and asthma, but is using his inhaled and nasal sprays with some control.  He has black mold in his apartment and does still smoke some marijuana, which he admits does not help with his breathing.      ROS:  Review of Systems   Constitutional: Negative for chills and fever.   HENT: Negative for congestion and sore throat.    Eyes: Negative for visual disturbance.   Respiratory: Positive for wheezing. Negative for cough and shortness of breath.    Cardiovascular: Negative for chest pain and palpitations.   Gastrointestinal: Negative for abdominal pain, constipation and nausea.   Endocrine: Negative for cold intolerance and heat intolerance.   Genitourinary: Negative for difficulty urinating and frequency.   Musculoskeletal: Negative for arthralgias and back pain.   Skin: Negative for rash.   Neurological: Negative for dizziness and headaches.   Psychiatric/Behavioral: Negative for dysphoric mood. The patient is not nervous/anxious.        No Known Allergies  Past Medical History:   Diagnosis Date   • Asthma    • COPD (chronic obstructive pulmonary disease) (CMS/[facility])      Past Surgical History:   Procedure Laterality Date   • HERNIA REPAIR       Family History   Problem Relation Age of Onset   • Heart murmur Mother    • Stroke Mother    • Hypertension Mother       reports that he quit smoking about 12 years ago. His smoking use included cigarettes. He has a 1.25 pack-year smoking history. He has never used smokeless tobacco. He reports current alcohol use. He reports current drug use. Drug: Marijuana.      Current Outpatient Medications:   •  albuterol (PROVENTIL) (2.5 MG/3ML) 0.083% nebulizer solution, Take 2.5 mg by nebulization Every 4 (Four) Hours As Needed for Wheezing.  "(Patient taking differently: Take 2.5 mg by nebulization Every 4 (Four) Hours As Needed for Wheezing. Dr Tello), Disp: 30 vial, Rfl: 0  •  albuterol sulfate  (90 Base) MCG/ACT inhaler, Inhale 2 puffs 4 (Four) Times a Day As Needed for Wheezing. (Patient taking differently: Inhale 2 puffs 4 (Four) Times a Day As Needed for Wheezing. Dr Tello), Disp: 18 g, Rfl: 11  •  ALLERGY SERUM INJECTION, Inject  under the skin into the appropriate area as directed 1 (One) Time., Disp: , Rfl:   •  azelastine (ASTELIN) 0.1 % nasal spray, 2 sprays into the nostril(s) as directed by provider 2 (Two) Times a Day. Use in each nostril as directed, Disp: 30 mL, Rfl: 3  •  Combivent Respimat  MCG/ACT inhaler, Inhale 1 puff Daily., Disp: 4 g, Rfl: 11  •  fluticasone (Flonase) 50 MCG/ACT nasal spray, 2 sprays into the nostril(s) as directed by provider Daily. 2 puffs each nostril, Disp: 16 g, Rfl: 3  •  QVAR REDIHALER 80 MCG/ACT inhaler, Inhale 2 puffs 2 (Two) Times a Day. (Patient taking differently: Inhale 2 puffs 2 (Two) Times a Day. Dr Tello), Disp: 10.6 g, Rfl: 11  •  Spiriva HandiHaler 18 MCG per inhalation capsule, Place 1 capsule into inhaler and inhale Daily., Disp: , Rfl:   •  EPINEPHrine (EpiPen 2-Joaquin) 0.3 MG/0.3ML solution auto-injector injection, Inject 0.3 mL into the appropriate muscle as directed by prescriber 1 (One) Time for 1 dose., Disp: 1 each, Rfl: 0    OBJECTIVE:  /80 (BP Location: Left arm, Patient Position: Sitting, Cuff Size: Adult)   Pulse 51   Temp 97.6 °F (36.4 °C)   Resp 16   Ht 165.1 cm (65\")   Wt 63.3 kg (139 lb 8 oz)   SpO2 98%   BMI 23.21 kg/m²    Physical Exam  Constitutional:       General: He is not in acute distress.     Appearance: Normal appearance.   HENT:      Head: Normocephalic and atraumatic.      Right Ear: Ear canal and external ear normal.      Left Ear: Ear canal and external ear normal.   Eyes:      General: No scleral icterus.     Extraocular Movements: " Extraocular movements intact.   Cardiovascular:      Rate and Rhythm: Normal rate and regular rhythm.      Heart sounds: Normal heart sounds. No murmur heard.     Pulmonary:      Effort: Pulmonary effort is normal. No respiratory distress.      Breath sounds: Normal breath sounds. No wheezing.   Abdominal:      General: There is no distension.      Palpations: Abdomen is soft.      Tenderness: There is no abdominal tenderness.   Musculoskeletal:         General: Normal range of motion.      Cervical back: Normal range of motion and neck supple.      Right lower leg: No edema.      Left lower leg: No edema.   Skin:     General: Skin is warm and dry.   Neurological:      Mental Status: He is alert and oriented to person, place, and time.      Gait: Gait normal.   Psychiatric:         Mood and Affect: Mood normal.         Behavior: Behavior normal.         Assessment/Plan     Diagnoses and all orders for this visit:    1. Annual visit for general adult medical examination with abnormal findings (Primary)  -     Comprehensive Metabolic Panel; Future  -     Hemoglobin A1c; Future  -     Lipid Panel; Future  -     CBC & Differential; Future  Immunizations:      - Tetanus: Received in 2020      - Influenza: Recommend yearly.      - Pneumovax: Due, but refused.      - Prevnar: Once after age 65      - Shingrix: Series after 50  Colonoscopy: Due at 45  Prostate: Discuss at 55 or on symptoms.    2. Asthma due to environmental allergies  3. Seasonal allergies  -     fluticasone (Flonase) 50 MCG/ACT nasal spray; 2 sprays into the nostril(s) as directed by provider Daily. 2 puffs each nostril  Dispense: 16 g; Refill: 3  -     Combivent Respimat  MCG/ACT inhaler; Inhale 1 puff Daily.  Dispense: 4 g; Refill: 11  -     azelasne (ASTELIN) 0.1 % nasal spray; 2 sprays into the nostril(s) as directed by provider 2 (Two) Times a Day. Use in each nostril as directed  Dispense: 30 mL; Refill: 3  Cotninue therapies through our  office, pulmonary and allergy.     4. Allergy to bee sting  -     EPINEPHrine (EpiPen 2-Joaquin) 0.3 MG/0.3ML solution auto-injector injection; Inject 0.3 mL into the appropriate muscle as directed by prescriber 1 (One) Time for 1 dose.  Dispense: 1 each; Refill: 0  Epi pen in the event of anaphylaxis as he did have some trouble breathing with the last sting.     5. Decreased urine stream  -     Urinalysis With Culture If Indicated -; Future  UA for evaluation.        An After Visit Summary was printed and given to the patient at discharge.  Return in about 1 year (around 7/20/2022) for Annual physical with me.         Reji Hardy D.O. 7/20/2021   Electronically signed.

## 2021-07-21 ENCOUNTER — LAB (OUTPATIENT)
Dept: LAB | Facility: HOSPITAL | Age: 46
End: 2021-07-21

## 2021-07-21 DIAGNOSIS — Z00.01 ANNUAL VISIT FOR GENERAL ADULT MEDICAL EXAMINATION WITH ABNORMAL FINDINGS: ICD-10-CM

## 2021-07-21 DIAGNOSIS — R39.198 DECREASED URINE STREAM: ICD-10-CM

## 2021-07-21 LAB
ALBUMIN SERPL-MCNC: 4.6 G/DL (ref 3.5–5.2)
ALBUMIN/GLOB SERPL: 2.1 G/DL
ALP SERPL-CCNC: 82 U/L (ref 39–117)
ALT SERPL W P-5'-P-CCNC: 23 U/L (ref 1–41)
ANION GAP SERPL CALCULATED.3IONS-SCNC: 9 MMOL/L (ref 5–15)
AST SERPL-CCNC: 27 U/L (ref 1–40)
BASOPHILS # BLD AUTO: 0.05 10*3/MM3 (ref 0–0.2)
BASOPHILS NFR BLD AUTO: 0.4 % (ref 0–1.5)
BILIRUB SERPL-MCNC: 1.1 MG/DL (ref 0–1.2)
BILIRUB UR QL STRIP: NEGATIVE
BUN SERPL-MCNC: 17 MG/DL (ref 6–20)
BUN/CREAT SERPL: 14.8 (ref 7–25)
CALCIUM SPEC-SCNC: 9.3 MG/DL (ref 8.6–10.5)
CHLORIDE SERPL-SCNC: 102 MMOL/L (ref 98–107)
CHOLEST SERPL-MCNC: 147 MG/DL (ref 0–200)
CLARITY UR: CLEAR
CO2 SERPL-SCNC: 27 MMOL/L (ref 22–29)
COLOR UR: YELLOW
CREAT SERPL-MCNC: 1.15 MG/DL (ref 0.76–1.27)
DEPRECATED RDW RBC AUTO: 39.1 FL (ref 37–54)
EOSINOPHIL # BLD AUTO: 0 10*3/MM3 (ref 0–0.4)
EOSINOPHIL NFR BLD AUTO: 0 % (ref 0.3–6.2)
ERYTHROCYTE [DISTWIDTH] IN BLOOD BY AUTOMATED COUNT: 11.9 % (ref 12.3–15.4)
GFR SERPL CREATININE-BSD FRML MDRD: 84 ML/MIN/1.73
GLOBULIN UR ELPH-MCNC: 2.2 GM/DL
GLUCOSE SERPL-MCNC: 94 MG/DL (ref 65–99)
GLUCOSE UR STRIP-MCNC: NEGATIVE MG/DL
HBA1C MFR BLD: 4.7 % (ref 4.8–5.6)
HCT VFR BLD AUTO: 46.1 % (ref 37.5–51)
HDLC SERPL-MCNC: 56 MG/DL (ref 40–60)
HGB BLD-MCNC: 14.8 G/DL (ref 13–17.7)
HGB UR QL STRIP.AUTO: NEGATIVE
IMM GRANULOCYTES # BLD AUTO: 0.09 10*3/MM3 (ref 0–0.05)
IMM GRANULOCYTES NFR BLD AUTO: 0.8 % (ref 0–0.5)
KETONES UR QL STRIP: NEGATIVE
LDLC SERPL CALC-MCNC: 50 MG/DL (ref 0–100)
LDLC/HDLC SERPL: 0.66 {RATIO}
LEUKOCYTE ESTERASE UR QL STRIP.AUTO: NEGATIVE
LYMPHOCYTES # BLD AUTO: 2.98 10*3/MM3 (ref 0.7–3.1)
LYMPHOCYTES NFR BLD AUTO: 25.4 % (ref 19.6–45.3)
MCH RBC QN AUTO: 28.8 PG (ref 26.6–33)
MCHC RBC AUTO-ENTMCNC: 32.1 G/DL (ref 31.5–35.7)
MCV RBC AUTO: 89.9 FL (ref 79–97)
MONOCYTES # BLD AUTO: 1.07 10*3/MM3 (ref 0.1–0.9)
MONOCYTES NFR BLD AUTO: 9.1 % (ref 5–12)
NEUTROPHILS NFR BLD AUTO: 64.3 % (ref 42.7–76)
NEUTROPHILS NFR BLD AUTO: 7.54 10*3/MM3 (ref 1.7–7)
NITRITE UR QL STRIP: NEGATIVE
NRBC BLD AUTO-RTO: 0 /100 WBC (ref 0–0.2)
PH UR STRIP.AUTO: 7.5 [PH] (ref 5–8)
PLATELET # BLD AUTO: 216 10*3/MM3 (ref 140–450)
PMV BLD AUTO: 10.8 FL (ref 6–12)
POTASSIUM SERPL-SCNC: 4.4 MMOL/L (ref 3.5–5.2)
PROT SERPL-MCNC: 6.8 G/DL (ref 6–8.5)
PROT UR QL STRIP: NEGATIVE
RBC # BLD AUTO: 5.13 10*6/MM3 (ref 4.14–5.8)
SODIUM SERPL-SCNC: 138 MMOL/L (ref 136–145)
SP GR UR STRIP: 1.01 (ref 1–1.03)
TRIGL SERPL-MCNC: 269 MG/DL (ref 0–150)
UROBILINOGEN UR QL STRIP: NORMAL
VLDLC SERPL-MCNC: 41 MG/DL (ref 5–40)
WBC # BLD AUTO: 11.73 10*3/MM3 (ref 3.4–10.8)

## 2021-07-21 PROCEDURE — 85025 COMPLETE CBC W/AUTO DIFF WBC: CPT

## 2021-07-21 PROCEDURE — 81003 URINALYSIS AUTO W/O SCOPE: CPT

## 2021-07-21 PROCEDURE — 80053 COMPREHEN METABOLIC PANEL: CPT

## 2021-07-21 PROCEDURE — 83036 HEMOGLOBIN GLYCOSYLATED A1C: CPT

## 2021-07-21 PROCEDURE — 80061 LIPID PANEL: CPT

## 2021-07-21 PROCEDURE — 36415 COLL VENOUS BLD VENIPUNCTURE: CPT

## 2021-08-16 DIAGNOSIS — J45.909 ASTHMA DUE TO ENVIRONMENTAL ALLERGIES: ICD-10-CM

## 2021-08-16 RX ORDER — BECLOMETHASONE DIPROPIONATE HFA 80 UG/1
AEROSOL, METERED RESPIRATORY (INHALATION)
Qty: 10.6 G | Refills: 11 | Status: SHIPPED | OUTPATIENT
Start: 2021-08-16 | End: 2022-02-23 | Stop reason: SDUPTHER

## 2021-12-30 PROCEDURE — U0004 COV-19 TEST NON-CDC HGH THRU: HCPCS | Performed by: NURSE PRACTITIONER

## 2022-01-03 ENCOUNTER — TELEMEDICINE (OUTPATIENT)
Dept: INTERNAL MEDICINE | Facility: CLINIC | Age: 47
End: 2022-01-03

## 2022-01-03 ENCOUNTER — TELEPHONE (OUTPATIENT)
Dept: INTERNAL MEDICINE | Facility: CLINIC | Age: 47
End: 2022-01-03

## 2022-01-03 DIAGNOSIS — R11.2 NAUSEA AND VOMITING, INTRACTABILITY OF VOMITING NOT SPECIFIED, UNSPECIFIED VOMITING TYPE: ICD-10-CM

## 2022-01-03 DIAGNOSIS — U07.1 COVID-19: ICD-10-CM

## 2022-01-03 DIAGNOSIS — J06.9 UPPER RESPIRATORY TRACT INFECTION, UNSPECIFIED TYPE: Primary | ICD-10-CM

## 2022-01-03 PROCEDURE — 99213 OFFICE O/P EST LOW 20 MIN: CPT | Performed by: NURSE PRACTITIONER

## 2022-01-03 RX ORDER — PROMETHAZINE HYDROCHLORIDE 12.5 MG/1
12.5 TABLET ORAL EVERY 6 HOURS PRN
Qty: 15 TABLET | Refills: 1 | Status: SHIPPED | OUTPATIENT
Start: 2022-01-03 | End: 2022-07-11

## 2022-01-03 RX ORDER — GUAIFENESIN 600 MG/1
1200 TABLET, EXTENDED RELEASE ORAL 2 TIMES DAILY
Qty: 30 TABLET | Refills: 0 | Status: SHIPPED | OUTPATIENT
Start: 2022-01-03 | End: 2022-06-06

## 2022-01-03 NOTE — TELEPHONE ENCOUNTER
I contacted Angelita with [facility] and she said their first available appointment is on Friday which is day 11 from symptom onset per Sanjuanita from information obtained from patient's UC note on 12/30. Angelita said patient would be outside the window to receive an infusion here, but said we can contact [facility] to see if they have appointments available before Friday.

## 2022-01-03 NOTE — PROGRESS NOTES
Chief Complaint   Patient presents with   • Illness     intermittent cough, congestion, fatigue, vomiting, headache       History:  Jose Pace is a 46 y.o. male who presents today for follow-up for evaluation of the above:    Illness  This is a new problem. The current episode started in the past 7 days. The problem occurs constantly. The problem has been gradually worsening. Associated symptoms include abdominal pain, headaches, myalgias and vomiting. The symptoms are aggravated by eating and drinking. He has tried NSAIDs and acetaminophen (zofran) for the symptoms. The treatment provided mild relief.      Symptom onset 12/27/2021  positive COVID test result 12/30/21  Unvaccinated.         ROS:  Review of Systems   Gastrointestinal: Positive for abdominal pain and vomiting.   Musculoskeletal: Positive for back pain and myalgias.   Neurological: Positive for dizziness and headaches.       Mr. Pace  reports that he quit smoking about 13 years ago. His smoking use included cigarettes. He has a 1.25 pack-year smoking history. He has never used smokeless tobacco. He reports current alcohol use. He reports current drug use. Drug: Marijuana.      Current Outpatient Medications:   •  albuterol (PROVENTIL) (2.5 MG/3ML) 0.083% nebulizer solution, Take 2.5 mg by nebulization Every 4 (Four) Hours As Needed for Wheezing. (Patient taking differently: Take 2.5 mg by nebulization Every 4 (Four) Hours As Needed for Wheezing. Dr Tello), Disp: 30 vial, Rfl: 0  •  albuterol sulfate  (90 Base) MCG/ACT inhaler, Inhale 2 puffs 4 (Four) Times a Day As Needed for Wheezing. (Patient taking differently: Inhale 2 puffs 4 (Four) Times a Day As Needed for Wheezing. Dr Tello), Disp: 18 g, Rfl: 11  •  ALLERGY SERUM INJECTION, Inject  under the skin into the appropriate area as directed 1 (One) Time., Disp: , Rfl:   •  azelastine (ASTELIN) 0.1 % nasal spray, 2 sprays into the nostril(s) as directed by provider 2 (Two) Times a Day.  Use in each nostril as directed, Disp: 30 mL, Rfl: 3  •  fluticasone (Flonase) 50 MCG/ACT nasal spray, 2 sprays into the nostril(s) as directed by provider Daily. 2 puffs each nostril, Disp: 16 g, Rfl: 3  •  ibuprofen (ADVIL,MOTRIN) 600 MG tablet, Take 1 tablet by mouth Every 6 (Six) Hours As Needed for Mild Pain ., Disp: 30 tablet, Rfl: 0  •  ondansetron ODT (Zofran ODT) 4 MG disintegrating tablet, Place 1 tablet on the tongue Every 8 (Eight) Hours As Needed for Nausea or Vomiting., Disp: 20 tablet, Rfl: 0  •  Qvar RediHaler 80 MCG/ACT inhaler, INHALE 2 PUFFS BY MOUTH TWICE DAILY, Disp: 10.6 g, Rfl: 11  •  Spiriva HandiHaler 18 MCG per inhalation capsule, Place 1 capsule into inhaler and inhale Daily., Disp: , Rfl:   •  guaiFENesin (Mucinex) 600 MG 12 hr tablet, Take 2 tablets by mouth 2 (Two) Times a Day., Disp: 30 tablet, Rfl: 0  •  promethazine (PHENERGAN) 12.5 MG tablet, Take 1 tablet by mouth Every 6 (Six) Hours As Needed for Nausea or Vomiting., Disp: 15 tablet, Rfl: 1      OBJECTIVE:  There were no vitals taken for this visit.   Physical Exam  Constitutional:       Appearance: He is ill-appearing.   Pulmonary:      Effort: No respiratory distress.   Neurological:      Mental Status: He is oriented to person, place, and time.   Psychiatric:         Behavior: Behavior normal.         Assessment/Plan    Diagnoses and all orders for this visit:    1. Upper respiratory tract infection, unspecified type (Primary)  -     guaiFENesin (Mucinex) 600 MG 12 hr tablet; Take 2 tablets by mouth 2 (Two) Times a Day.  Dispense: 30 tablet; Refill: 0    2. COVID-19  -     guaiFENesin (Mucinex) 600 MG 12 hr tablet; Take 2 tablets by mouth 2 (Two) Times a Day.  Dispense: 30 tablet; Refill: 0    3. Nausea and vomiting, intractability of vomiting not specified, unspecified vomiting type  -     promethazine (PHENERGAN) 12.5 MG tablet; Take 1 tablet by mouth Every 6 (Six) Hours As Needed for Nausea or Vomiting.  Dispense: 15 tablet;  Refill: 1    Discussed continued use of tylenol and mucinex. Avoid NSAIDs due to GI upset until able to tolerate PO intake.   He does not appear in respiratory distress. He does have Zofran but has had increased n/v and abdominal pain. Scant bleeding from sinuses.     Phenergan sent.   [facility] does not have infusion available inside his 10 day window.  [facility] has a 5 day from symptom onset policy and he is also outside this window.       Discussed that should he experience severe shortness of breath or respiratory distress to go to the ER.       Symptom Relief for Viral Illnesses       1. DIAGNOSIS  2. GENERAL INSTRUCTIONS   · Cold or cough  · Middle ear fluid ([place] [employer] with Effusion, OME)  · Flu  · Viral sore throat  · Bronchitis    You have been diagnosed with an illness caused by a virus.  Antibiotics do not work on viruses.  When antibiotics aren't needed, they won't help you, and the side effects could still hurt you.  The treatments prescribed below will help you feel better while your body fights off the virus.   · Drink extra water and fluids  · Use a cool mist vaporizer or saline nasal spray to relieve congestion  · For sore throats in older children and adults, use ice chips, sore throat spray, or lozenges  · Use honey to relieve cough.  Do not give honey to an infant younger than 1 year.      3. SPECIFIC MEDICINES  4. FOLLOW UP   Use over-the-counter medications specific to your symptoms. Use medicines according to the package instructions or as directed by your healthcare professional.  Stop the medication when the symptoms get better.   If not improved in 3-5 days, if new symptoms occur, or if you have other concerns, please call or return to the office for a recheck.         To learn more about antibiotic prescribing and use, visit www.cdc.gov/antibiotic-use         An After Visit Summary was printed and given to the patient at discharge.  Return if symptoms worsen or fail to improve, for  Next scheduled follow up. Sooner if problems arise.          Sanjuanita Canales APRN. 1/3/2022   Electronically Signed

## 2022-01-03 NOTE — TELEPHONE ENCOUNTER
I called [facility] and they are requiring patients be within a 5 day window from symptom onset and patient is already on day 7.  If he would like to discuss other treatments he can schedule a virtual visit with our office.   If experiencing severe symptoms or shortness of breath he needs to seek emergency care.

## 2022-01-03 NOTE — PATIENT INSTRUCTIONS
Symptom Relief for Viral Illnesses       1. DIAGNOSIS  2. GENERAL INSTRUCTIONS   · Cold or cough  · Middle ear fluid ([place] [employer] with Effusion, OME)  · Flu  · Viral sore throat  · Bronchitis    You have been diagnosed with an illness caused by a virus.  Antibiotics do not work on viruses.  When antibiotics aren't needed, they won't help you, and the side effects could still hurt you.  The treatments prescribed below will help you feel better while your body fights off the virus.   · Drink extra water and fluids  · Use a cool mist vaporizer or saline nasal spray to relieve congestion  · For sore throats in older children and adults, use ice chips, sore throat spray, or lozenges  · Use honey to relieve cough.  Do not give honey to an infant younger than 1 year.      3. SPECIFIC MEDICINES  4. FOLLOW UP   Use over-the-counter medications specific to your symptoms. Use medicines according to the package instructions or as directed by your healthcare professional.  Stop the medication when the symptoms get better.   If not improved in 3-5 days, if new symptoms occur, or if you have other concerns, please call or return to the office for a recheck.         To learn more about antibiotic prescribing and use, visit www.cdc.gov/antibiotic-use

## 2022-01-03 NOTE — TELEPHONE ENCOUNTER
Caller: Jose Pace    Relationship: Self    Best call back number: [phone]    What is the best time to reach you: AS SOON AS POSSIBLE PLEASE     Who are you requesting to speak with (clinical staff, provider,  specific staff member): PROVIDER OR CLINICAL       What was the call regarding: PATIENT REQUESTING A CALL BACK TO DISCUSS IF HE QUALIFIES AND GET AN ORDER FOR THE COVID INFUSION DUE TO HIS ASTHMA  PATIENT STATES HE TESTED POSITIVE ON Thursday     Do you require a callback YES

## 2022-02-23 ENCOUNTER — OFFICE VISIT (OUTPATIENT)
Dept: PULMONOLOGY | Facility: CLINIC | Age: 47
End: 2022-02-23

## 2022-02-23 VITALS
BODY MASS INDEX: 23.93 KG/M2 | HEIGHT: 65 IN | SYSTOLIC BLOOD PRESSURE: 132 MMHG | WEIGHT: 143.6 LBS | OXYGEN SATURATION: 99 % | DIASTOLIC BLOOD PRESSURE: 84 MMHG | HEART RATE: 78 BPM

## 2022-02-23 DIAGNOSIS — Z20.822 ENCOUNTER FOR PREOPERATIVE SCREENING LABORATORY TESTING FOR COVID-19 VIRUS: ICD-10-CM

## 2022-02-23 DIAGNOSIS — Z86.16 HISTORY OF COVID-19: ICD-10-CM

## 2022-02-23 DIAGNOSIS — J45.909 ASTHMA DUE TO ENVIRONMENTAL ALLERGIES: Primary | Chronic | ICD-10-CM

## 2022-02-23 DIAGNOSIS — F12.20 MODERATE TETRAHYDROCANNABINOL (THC) DEPENDENCE: ICD-10-CM

## 2022-02-23 DIAGNOSIS — Z01.812 ENCOUNTER FOR PREOPERATIVE SCREENING LABORATORY TESTING FOR COVID-19 VIRUS: ICD-10-CM

## 2022-02-23 PROCEDURE — 99213 OFFICE O/P EST LOW 20 MIN: CPT | Performed by: NURSE PRACTITIONER

## 2022-02-23 RX ORDER — BECLOMETHASONE DIPROPIONATE HFA 80 UG/1
2 AEROSOL, METERED RESPIRATORY (INHALATION) 2 TIMES DAILY
Qty: 10.6 G | Refills: 11 | Status: SHIPPED | OUTPATIENT
Start: 2022-02-23 | End: 2022-06-06 | Stop reason: SDUPTHER

## 2022-02-23 RX ORDER — DEXAMETHASONE 6 MG/1
TABLET ORAL
Qty: 13 TABLET | Refills: 0 | Status: SHIPPED | OUTPATIENT
Start: 2022-02-23 | End: 2022-04-12

## 2022-02-23 NOTE — PROGRESS NOTES
"Chief Complaint  Asthma and stage 2 moderate copd    Subjective    History of Present Illness      Jose Pace presents to [facility] for:    History of Present Illness   Annual visit for management of typically well-controlled asthma.  He was Covid positive in December and really had a tough time with it his symptoms were predominantly GI.  He feels that he is still recovering from the Covid and as result is having some thick sputum production.  He was due for PFTs but could not get them done due to the recent Covid diagnosis.  He continues using Qvar daily and rescue inhaler as needed.  I am going to put him on a round of steroids to see if it will help with this sputum and chest congestion.  He does not take vaccines.  He has been encouraged to reschedule with Dr. Tello to resume allergy injections.  Objective   Vital Signs:   /84   Pulse 78   Ht 165.1 cm (65\")   Wt 65.1 kg (143 lb 9.6 oz)   SpO2 99% Comment: ra  BMI 23.90 kg/m²     Physical Exam  Vitals reviewed.   Constitutional:       General: He is not in acute distress.     Appearance: Normal appearance.   HENT:      Head: Normocephalic and atraumatic.      Comments: Wearing a mask  Cardiovascular:      Rate and Rhythm: Normal rate and regular rhythm.   Pulmonary:      Effort: Pulmonary effort is normal.      Breath sounds: Normal breath sounds.   Musculoskeletal:      Cervical back: Normal range of motion.   Skin:     General: Skin is warm and dry.   Neurological:      Mental Status: He is alert and oriented to person, place, and time.   Psychiatric:         Mood and Affect: Mood normal.         Behavior: Behavior normal.        Result Review :       Results for orders placed in visit on 02/27/20    Pulmonary Function Test    Narrative  Pulmonary Function Test  Performed by: Mike Forbes APRN  Authorized by: Mike Forbes APRN    Pre Drug  FVC: 97%  FEV1: 89%  FEF " 25-75%: 59%  FEV1/FVC: 76.17%      Results for orders placed in visit on 07/18/19    Pulmonary Function Test               Assessment and Plan      Diagnoses and all orders for this visit:    1. Asthma due to environmental allergies (Primary)  Comments:  Typically stable but currently with some symptoms likely from recent Covid infection.  Continue Qvar, rescue inhaler & nasal sprays.  Dexamethasone taper.   Orders:  -     Pulmonary Function Test  -     Qvar RediHaler 80 MCG/ACT inhaler; Inhale 2 puffs 2 (Two) Times a Day.  Dispense: 10.6 g; Refill: 11  -     dexamethasone (DECADRON) 6 MG tablet; Take 1 tablet by mouth twice daily x4 days, take 1 tablet by mouth daily x3 days, take half a tablet once daily x3 days and discontinue.  Dispense: 13 tablet; Refill: 0    2. Encounter for preoperative screening laboratory testing for COVID-19 virus  Comments:  This was not performed as the patient recently had COVID and was not eligible for pulmonary function testing.  Orders:  -     COVID PRE-OP / PRE-PROCEDURE SCREENING ORDER (NO ISOLATION) - Swab, Nasal Cavity; Future    3. History of COVID-19  Comments:  Recovered without hospitalization. Predominant symptoms were GI and not respiratory.  He may however be having some residual increased sputum & chest congestion  Orders:  -     XR Chest 2 View; Future    4. Moderate tetrahydrocannabinol (THC) dependence (HCC)  Comments:  Obtain chest x-ray prior to follow-up visit.  Orders:  -     XR Chest 2 View; Future        Mike Forbes, APRN  2/24/2022  15:19 CST    Follow Up   Return in about 1 year (around 2/23/2023) for FVL on return, CXR.    Patient was given instructions and counseling regarding his condition or for health maintenance advice. Please see specific information pulled into the AVS if appropriate.

## 2022-04-12 ENCOUNTER — OFFICE VISIT (OUTPATIENT)
Dept: INTERNAL MEDICINE | Facility: CLINIC | Age: 47
End: 2022-04-12

## 2022-04-12 ENCOUNTER — HOSPITAL ENCOUNTER (OUTPATIENT)
Dept: GENERAL RADIOLOGY | Facility: HOSPITAL | Age: 47
Discharge: HOME | End: 2022-04-12

## 2022-04-12 VITALS
SYSTOLIC BLOOD PRESSURE: 106 MMHG | OXYGEN SATURATION: 95 % | WEIGHT: 144 LBS | HEART RATE: 88 BPM | BODY MASS INDEX: 23.99 KG/M2 | HEIGHT: 65 IN | DIASTOLIC BLOOD PRESSURE: 62 MMHG

## 2022-04-12 DIAGNOSIS — M54.50 ACUTE LOW BACK PAIN, UNSPECIFIED BACK PAIN LATERALITY, UNSPECIFIED WHETHER SCIATICA PRESENT: ICD-10-CM

## 2022-04-12 DIAGNOSIS — M51.9 LUMBAR DISC DISEASE: Primary | ICD-10-CM

## 2022-04-12 DIAGNOSIS — M54.6 ACUTE LEFT-SIDED THORACIC BACK PAIN: ICD-10-CM

## 2022-04-12 PROCEDURE — 72100 X-RAY EXAM L-S SPINE 2/3 VWS: CPT

## 2022-04-12 PROCEDURE — 99214 OFFICE O/P EST MOD 30 MIN: CPT | Performed by: NURSE PRACTITIONER

## 2022-04-12 PROCEDURE — 72072 X-RAY EXAM THORAC SPINE 3VWS: CPT

## 2022-04-12 RX ORDER — METHYLPREDNISOLONE 4 MG/1
TABLET ORAL
Qty: 1 EACH | Refills: 0 | Status: SHIPPED | OUTPATIENT
Start: 2022-04-12 | End: 2022-06-06

## 2022-04-12 RX ORDER — CYCLOBENZAPRINE HCL 10 MG
10 TABLET ORAL 3 TIMES DAILY PRN
Qty: 30 TABLET | Refills: 0 | Status: SHIPPED | OUTPATIENT
Start: 2022-04-12
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
## 2022-04-26 ENCOUNTER — TREATMENT (OUTPATIENT)
Dept: PHYSICAL THERAPY | Facility: CLINIC | Age: 47
End: 2022-04-26

## 2022-04-26 DIAGNOSIS — M51.9 LUMBAR DISC DISEASE: Primary | ICD-10-CM

## 2022-04-26 DIAGNOSIS — M54.50 LUMBAR BACK PAIN: ICD-10-CM

## 2022-04-26 DIAGNOSIS — G89.29 CHRONIC LEFT-SIDED THORACIC BACK PAIN: ICD-10-CM

## 2022-04-26 DIAGNOSIS — M54.6 CHRONIC LEFT-SIDED THORACIC BACK PAIN: ICD-10-CM

## 2022-04-26 PROCEDURE — 97161 PT EVAL LOW COMPLEX 20 MIN: CPT | Performed by: PHYSICAL THERAPIST
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
## 2022-05-05 ENCOUNTER — TREATMENT (OUTPATIENT)
Dept: PHYSICAL THERAPY | Facility: CLINIC | Age: 47
End: 2022-05-05

## 2022-05-05 DIAGNOSIS — M54.50 LUMBAR BACK PAIN: ICD-10-CM

## 2022-05-05 DIAGNOSIS — M54.6 CHRONIC LEFT-SIDED THORACIC BACK PAIN: ICD-10-CM

## 2022-05-05 DIAGNOSIS — G89.29 CHRONIC LEFT-SIDED THORACIC BACK PAIN: ICD-10-CM

## 2022-05-05 DIAGNOSIS — M51.9 LUMBAR DISC DISEASE: Primary | ICD-10-CM

## 2022-05-05 PROCEDURE — 97014 ELECTRIC STIMULATION THERAPY: CPT | Performed by: PHYSICAL THERAPIST

## 2022-05-05 PROCEDURE — 97140 MANUAL THERAPY 1/> REGIONS: CPT | Performed by: PHYSICAL THERAPIST

## 2022-05-05 NOTE — PROGRESS NOTES
"                                                                Physical Therapy Treatment Note    Patient: Jose Pace                                                                     Visit Date: 2022  :     1975    Referring practitioner:    ABIMBOLA Campoverde  Date of Initial Visit:          Type: THERAPY  Noted: 2022    Patient seen for 2 sessions    Visit Diagnoses:    ICD-10-CM ICD-9-CM   1. Lumbar disc disease  M51.9 722.93   2. Lumbar back pain  M54.50 724.2   3. Chronic left-sided thoracic back pain  M54.6 724.1    G89.29 338.29     SUBJECTIVE     Subjective He reports he sneezes and \"it's over\" pain from his neck to his butt. Reports he has gotten to where he can't do anything in the gym without increasing his pain    PAIN: 7/10         OBJECTIVE     Objective      Manual Therapy     20418  Comments   STM B lower lumbar region prone                   Timed Minutes 15        Modalities Comments   E-stim pre-modulated quadripolar setup 18 mA with MH prone       Minutes 15       Therapy Education/Self Care 00049   Details:    Date:    Given postural retraining  symptom relief   Progress: New   Education provided to:  Patient   Level of understanding Verbalized   Timed Minutes        Total Timed Treatment:     15   mins  Total Time of Visit:             30   mins         ASSESSMENT/PLAN     GOALS  Goals                                                  Progress Note: 22                                                               Re-Cert due: 22     STG by: 3 weeks Comments Status Date   Pt to demonstrate decreased lumbar paraspinal muscle guarding upon palpation.                             LTG by: 6 weeks         Pt to demonstrate independence with comprehensive HEP.         Pt to demonstrate B hip abductor strength of 4+/5.         Pt to demonstrate B glute max strength of 4+/5.         Pt demonstrates pain free lumbar ROM in all planes.          Pt to score < 5/50 on " the [facility].               Assessment/Plan     ASSESSMENT: No goals have been met at this time but today was the first treatment session. Due to an error at the desk he did have a truncated treatment session.    PLAN: Continue a conservative approach as activity increases his pain. In addition, he will attend consecutive sessions on consecutive days and any symptom flares during PT will cause him not to buy into the PT  POC.    SIGNATURE: Sixto Oliveira [facility] [place], [place] License #: [id number]  Electronically Signed on 5/5/2022        [address] [place] [address]. [address]  [phone]

## 2022-05-06 ENCOUNTER — TREATMENT (OUTPATIENT)
Dept: PHYSICAL THERAPY | Facility: CLINIC | Age: 47
End: 2022-05-06

## 2022-05-06 DIAGNOSIS — M54.50 LUMBAR BACK PAIN: ICD-10-CM

## 2022-05-06 DIAGNOSIS — M54.6 CHRONIC LEFT-SIDED THORACIC BACK PAIN: ICD-10-CM

## 2022-05-06 DIAGNOSIS — M51.9 LUMBAR DISC DISEASE: Primary | ICD-10-CM

## 2022-05-06 DIAGNOSIS — G89.29 CHRONIC LEFT-SIDED THORACIC BACK PAIN: ICD-10-CM

## 2022-05-06 PROCEDURE — 97140 MANUAL THERAPY 1/> REGIONS: CPT | Performed by: PHYSICAL THERAPIST

## 2022-05-06 PROCEDURE — 97110 THERAPEUTIC EXERCISES: CPT | Performed by: PHYSICAL THERAPIST

## 2022-05-06 NOTE — PROGRESS NOTES
Physical Therapy Treatment Note    Patient: Jose Pace                                                                     Visit Date: 2022  :     1975    Referring practitioner:    ABIMBOLA Campoverde  Date of Initial Visit:          Type: THERAPY  Noted: 2022    Patient seen for 3 sessions    Visit Diagnoses:    ICD-10-CM ICD-9-CM   1. Lumbar disc disease  M51.9 722.93   2. Lumbar back pain  M54.50 724.2   3. Chronic left-sided thoracic back pain  M54.6 724.1    G89.29 338.29     SUBJECTIVE     Subjective  He didn't sleep well last night, he slept about 4-5 hours of sleep but not continuous. He wears his back brace sometimes when mowing.  PAIN: 7-8/10 lower thoracic and lumbar area         OBJECTIVE     Objective      Manual Therapy     03140  Comments   STM B lower lumbar region Prone, one pillow under abdomen, bolster under feet.  Mod pressure.                    Timed Minutes 15     Therapeutic Exercises    23105 Comments   Towel roll along spine in sitting Decreases pain slightly   PPT  X 10 added to HEP   TA  contraction X 5 - causes increases pain   Stressed good posture Work on daily       Timed Minutes 25        Therapy Education/Self Care 72011   Details: See HEP listed below   Date: 22   Given [facility] HEP (access code [email or web address])   Progress: New   Education provided to:  Patient   Level of understanding Verbalized and Demonstrated   Timed Minutes          Access Code: [email or web address]  URL: [email or web address]  Date: 2022  Prepared by: Berenice Garcia    Program Notes  Sit it a straight back chair with a towel roll along spine.      Exercises  Supine Posterior Pelvic Tilt - 2 x daily - 7 x weekly - 1 sets - 10 reps  Bent Knee Fallouts with Alternating Legs - 2 x daily - 7 x weekly - 3 sets - 10 reps  Correct Standing Posture - 7 x weekly      Total Timed Treatment:     40   mins  Total Time of Visit:              45   mins         ASSESSMENT/PLAN     GOALS  Goals                                                  Progress Note: 5/26/22                                                               Re-Cert due: 7/24/22     STG by: 3 weeks Comments Status Date   Pt to demonstrate decreased lumbar paraspinal muscle guarding upon palpation.  Continues to have muscle guarding 5/6/22 Ongoing                       LTG by: 6 weeks         Pt to demonstrate independence with comprehensive HEP. Started on HEP today. 5/6/22 Ongoing   Pt to demonstrate B hip abductor strength of 4+/5.         Pt to demonstrate B glute max strength of 4+/5.         Pt demonstrates pain free lumbar ROM in all planes.          Pt to score < 5/50 on the SUPRIYA.               Assessment/Plan     ASSESSMENT:  Patient is not getting much sleep and has high pain levels.  Today we worked on finding exercises for an HEP that he was able to do without pain.  He has poor posture and needs verbal cues, he will try to use the towel roll at home to help him work on posture.  He mows and wears a back brace because the mowing causes increase pain.  Will see how he does with his HEP and progress his exercises if able.     PLAN: Assess how patient is doing with his HEP, will progress his exercises as well as add stretches for flexibility.  Use manual work for pain.     SIGNATURE: Berenice Garcia PTA, [facility] [place] License #: [id number]  Electronically Signed on 5/6/2022        [address] [place] [address] [place]  [place] [address]  [phone]

## 2022-05-11 ENCOUNTER — TREATMENT (OUTPATIENT)
Dept: PHYSICAL THERAPY | Facility: CLINIC | Age: 47
End: 2022-05-11

## 2022-05-11 DIAGNOSIS — M54.6 CHRONIC LEFT-SIDED THORACIC BACK PAIN: ICD-10-CM

## 2022-05-11 DIAGNOSIS — G89.29 CHRONIC LEFT-SIDED THORACIC BACK PAIN: ICD-10-CM

## 2022-05-11 DIAGNOSIS — M54.50 LUMBAR BACK PAIN: ICD-10-CM

## 2022-05-11 DIAGNOSIS — M51.9 LUMBAR DISC DISEASE: Primary | ICD-10-CM

## 2022-05-11 PROCEDURE — 97110 THERAPEUTIC EXERCISES: CPT | Performed by: PHYSICAL THERAPIST

## 2022-05-11 PROCEDURE — 97140 MANUAL THERAPY 1/> REGIONS: CPT | Performed by: PHYSICAL THERAPIST

## 2022-05-11 NOTE — PROGRESS NOTES
Physical Therapy Treatment Note    Patient: Jose Pace                                                                     Visit Date: 2022  :     1975    Referring practitioner:    ABIMBOLA Campoverde  Date of Initial Visit:          Type: THERAPY  Noted: 2022    Patient seen for 4 sessions    Visit Diagnoses:    ICD-10-CM ICD-9-CM   1. Lumbar disc disease  M51.9 722.93   2. Lumbar back pain  M54.50 724.2   3. 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 He tried to work out some but had pain in the lat pulls and rows.  The leg press was ok, he is working on his posture.     PAIN: 5/10 lower thoracic and lumbar area  Pain 5/10 after treatment         OBJECTIVE     Objective      Manual Therapy     06837  Comments   STM B lower lumbar region Prone, one pillow under abdomen, bolster under feet.  Mod pressure.                    Timed Minutes 15     Therapeutic Exercises    10646 Comments   Gentle self piriformis stretch X 3 each LE , needs cues to do stretch slowly and not too aggressive.  Added to HEP.   Gentle long sitting hamstring stretch X 3 each LE , needs cues to do stretch slowly and not too aggressive.  Added to HEP.   Clamshells X 10 each side, added to HEP   Prayer stretch X 2 , added to HEP       Timed Minutes 30        Therapy Education/Self Care 38060   Details: See HEP listed below   Date: 22   Given [facility] (access code [email or web address])   Progress: New   Education provided to:  Patient   Level of understanding Verbalized and Demonstrated   Timed Minutes          Access Code: [email or web address]  URL: [email or web address]  Date: 2022  Prepared by: Berenice Garcia    Program Notes  Sit it a straight back chair with a towel roll along spine.      Exercises  Supine Posterior Pelvic Tilt - 2 x daily - 7 x weekly - 1 sets - 10  reps  Bent Knee Fallouts with Alternating Legs - 2 x daily - 7 x weekly - 3 sets - 10 reps  Correct Standing Posture - 7 x weekly  Supine Piriformis Stretch with Foot on Ground - 2 x daily - 7 x weekly - 1 sets - 5 reps  Seated Table Hamstring Stretch - 2 x daily - 7 x weekly - 1 sets - 5 reps  Beginner Clam - 2 x daily - 7 x weekly - 1 sets - 10 reps  Child's Pose Stretch - 2 x daily - 7 x weekly - 1 sets - 3 reps - 15 second hold        Total Timed Treatment:     45   mins  Total Time of Visit:             45   mins         ASSESSMENT/PLAN     GOALS  Goals                                                  Progress Note: 5/26/22                                                               Re-Cert due: 7/24/22     STG by: 3 weeks Comments Status Date   Pt to demonstrate decreased lumbar paraspinal muscle guarding upon palpation.  Continues to have muscle guarding 5/6/22 Ongoing                       LTG by: 6 weeks         Pt to demonstrate independence with comprehensive HEP. Progressed HEP 5/11/22 Ongoing   Pt to demonstrate B hip abductor strength of 4+/5.         Pt to demonstrate B glute max strength of 4+/5.         Pt demonstrates pain free lumbar ROM in all planes.          Pt to score < 5/50 on the SUPRIYA.               Assessment/Plan     ASSESSMENT:  Patient needs cues when working on his new stretches, he has a tendency to be too aggressive with the stretches.  He compensates when doing the hamstrings as he rotates his trunk at end ranges, with cues he is able to do the exercises correctly. When patient is too aggressive with the stretches he has increase pain in the L low back which happened today.  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  Patient is being more cautious when working out at the gym and if he has increase pain he does not continue with the exercise.  Patient is using ice at home for pain at home and work.      PLAN: Assess how patient is doing with his HEP, use manual work for pain.     SIGNATURE: Berenice Garcia PTA, [facility] [place] License #: [id number]  Electronically Signed on 5/11/2022        [address] [facility] [place] [address]  [phone]

## 2022-05-20 ENCOUNTER — TREATMENT (OUTPATIENT)
Dept: PHYSICAL THERAPY | Facility: CLINIC | Age: 47
End: 2022-05-20

## 2022-05-20 DIAGNOSIS — M54.6 CHRONIC LEFT-SIDED THORACIC BACK PAIN: ICD-10-CM

## 2022-05-20 DIAGNOSIS — M51.9 LUMBAR DISC DISEASE: Primary | ICD-10-CM

## 2022-05-20 DIAGNOSIS — M54.50 LUMBAR BACK PAIN: ICD-10-CM

## 2022-05-20 DIAGNOSIS — G89.29 CHRONIC LEFT-SIDED THORACIC BACK PAIN: ICD-10-CM

## 2022-05-20 PROCEDURE — 97140 MANUAL THERAPY 1/> REGIONS: CPT | Performed by: PHYSICAL THERAPIST

## 2022-05-20 NOTE — PROGRESS NOTES
Physical Therapy Treatment Note    Patient: Jose Pace                                                                     Visit Date: 2022  :     1975    Referring practitioner:    ABIMBOLA Campoverde  Date of Initial Visit:          Type: THERAPY  Noted: 2022    Patient seen for 5 sessions    Visit Diagnoses:    ICD-10-CM ICD-9-CM   1. Lumbar disc disease  M51.9 722.93   2. Lumbar back pain  M54.50 724.2   3. Chronic left-sided thoracic back pain  M54.6 724.1    G89.29 338.29     SUBJECTIVE     Subjective He reports not being as tender in his low back. He notices he is tender around his ribcage    PAIN: 6/10         OBJECTIVE     Objective      Manual Therapy     74988  Comments   STM B lower lumbar region prone   Thoracic extension mobilizations Grade 2 prone   B hip ER/IR stretches with intermittent inferior lateral glides Grade 2 HL            Timed Minutes 45          Therapy Education/Self Care 58954   Details:    Date last updated:    Given postural retraining   Progress: New   Education provided to:  Patient   Level of understanding Verbalized   Timed Minutes      Access Code: [email or web address]  URL: [email or web address]  Date: 2022  Prepared by: Berenice Garcia     Program Notes  Sit it a straight back chair with a towel roll along spine.        Exercises  Supine Posterior Pelvic Tilt - 2 x daily - 7 x weekly - 1 sets - 10 reps  Bent Knee Fallouts with Alternating Legs - 2 x daily - 7 x weekly - 3 sets - 10 reps  Correct Standing Posture - 7 x weekly  Supine Piriformis Stretch with Foot on Ground - 2 x daily - 7 x weekly - 1 sets - 5 reps  Seated Table Hamstring Stretch - 2 x daily - 7 x weekly - 1 sets - 5 reps  Beginner Clam - 2 x daily - 7 x weekly - 1 sets - 10 reps  Child's Pose Stretch - 2 x daily - 7 x weekly - 1 sets - 3 reps - 15 second hold        Total Timed Treatment:     45   mins  Total Time of Visit:              45   mins         ASSESSMENT/PLAN     GOALS  Goals                                                  Progress Note: 5/26/22                                                               Re-Cert due: 7/24/22     STG by: 3 weeks Comments Status Date   Pt to demonstrate decreased lumbar paraspinal muscle guarding upon palpation.  Continues to have muscle guarding 5/6/22 Ongoing                       LTG by: 6 weeks         Pt to demonstrate independence with comprehensive HEP. reinforced 5/20/22 Ongoing   Pt to demonstrate B hip abductor strength of 4+/5.         Pt to demonstrate B glute max strength of 4+/5.         Pt demonstrates pain free lumbar ROM in all planes.          Pt to score < 5/50 on the SUPRIYA.             Assessment/Plan     ASSESSMENT: He continues to present with LBP and point tenderness. However, there wasn't any guarding in his lower lumbar region today with STM.  In addition, there was a minimal amount of soft tissue restrictions in his lower lumbar region today.    PLAN: Continue to progress as symptoms allow.    SIGNATURE: Sixto Oliveira PTA, [place] License #: [id number]  Electronically Signed on 5/20/2022        [address] [place] [address]. [address]  [phone]

## 2022-05-23 ENCOUNTER — TREATMENT (OUTPATIENT)
Dept: PHYSICAL THERAPY | Facility: CLINIC | Age: 47
End: 2022-05-23

## 2022-05-23 DIAGNOSIS — M54.50 LUMBAR BACK PAIN: ICD-10-CM

## 2022-05-23 DIAGNOSIS — G89.29 CHRONIC LEFT-SIDED THORACIC BACK PAIN: ICD-10-CM

## 2022-05-23 DIAGNOSIS — M54.6 CHRONIC LEFT-SIDED THORACIC BACK PAIN: ICD-10-CM

## 2022-05-23 DIAGNOSIS — M51.9 LUMBAR DISC DISEASE: Primary | ICD-10-CM

## 2022-05-23 PROCEDURE — 97140 MANUAL THERAPY 1/> REGIONS: CPT | Performed by: PHYSICAL THERAPIST

## 2022-05-23 PROCEDURE — 97110 THERAPEUTIC EXERCISES: CPT | Performed by: PHYSICAL THERAPIST

## 2022-05-23 NOTE — PROGRESS NOTES
Physical Therapy Treatment Note    Patient: Jose Pace                                                                     Visit Date: 2022  :     1975    Referring practitioner:    ABIMBOLA Campoverde  Date of Initial Visit:          Type: THERAPY  Noted: 2022    Patient seen for 6 sessions    Visit Diagnoses:    ICD-10-CM ICD-9-CM   1. Lumbar disc disease  M51.9 722.93   2. Lumbar back pain  M54.50 724.2   3. Chronic left-sided thoracic back pain  M54.6 724.1    G89.29 338.29     SUBJECTIVE     Subjective He states he thinks the lung pain is from COPD.  He is started to feel better, he is able to stretch and do his exercises with less pain.  He is not taking the muscle relax ors anymore, he states PT helps him for the rest of the day and work is easier. He is working on exercises at the gym but not doing exercises that are painful.     PAIN: 4-5/10 L side of the low back         OBJECTIVE     Objective      Manual Therapy     73047  Comments   STM B lower lumbar region prone   Thoracic extension mobilizations Grade 2 prone   Stretched hamstrings and piriformis            Timed Minutes 35        Therapeutic Exercises    02692 Comments   Self latissimus stretch X 3 each, added to HEP   Cat/cow X 10   Prayer stretch X 3           Timed Minutes 10       Therapy Education/Self Care 72617   Details: Continue to work on HEP and when working out at the gym be aware of posture and make sure not to do exercises that cause increase pain. Stretch latissimus.   Date last updated:    Given postural retraining   Progress: Reinforced and new   Education provided to:  Patient   Level of understanding Verbalized   Timed Minutes      Access Code: [email or web address]  URL: [email or web address]  Date: 2022  Prepared by: Berenice Garcia     Program Notes  Sit it a straight back chair with a towel roll along spine.        Exercises  Supine Posterior  Pelvic Tilt - 2 x daily - 7 x weekly - 1 sets - 10 reps  Bent Knee Fallouts with Alternating Legs - 2 x daily - 7 x weekly - 3 sets - 10 reps  Correct Standing Posture - 7 x weekly  Supine Piriformis Stretch with Foot on Ground - 2 x daily - 7 x weekly - 1 sets - 5 reps  Seated Table Hamstring Stretch - 2 x daily - 7 x weekly - 1 sets - 5 reps  Beginner Clam - 2 x daily - 7 x weekly - 1 sets - 10 reps  Child's Pose Stretch - 2 x daily - 7 x weekly - 1 sets - 3 reps - 15 second hold        Total Timed Treatment:     45   mins  Total Time of Visit:             45   mins         ASSESSMENT/PLAN     GOALS  Goals                                                  Progress Note: 5/26/22                                                               Re-Cert due: 7/24/22     STG by: 3 weeks Comments Status Date   Pt to demonstrate decreased lumbar paraspinal muscle guarding upon palpation.  Still has muscle guarding, especially after lying prone today.  5/23/22 Ongoing                       LTG by: 6 weeks         Pt to demonstrate independence with comprehensive HEP. reinforced 5/20/22 Ongoing   Pt to demonstrate B hip abductor strength of 4+/5.         Pt to demonstrate B glute max strength of 4+/5.         Pt demonstrates pain free lumbar ROM in all planes.          Pt to score < 5/50 on the SUPRIYA.             Assessment/Plan     ASSESSMENT: Overall he is feeling some better, he is able to go from sit to stand without as much guarding.  He does have increase guarding after lying prone for the STM today.  He is making an effort to make sure the does not do exercises at the gym that cause increase pain.  He will work on stretching each latissimus.     PLAN: Continue to progress as symptoms allow.    SIGNATURE: Berenice Garcia PTA, [facility] License #: [id number]  Electronically Signed on 5/23/2022        [address] [place] [address]. [address]  [phone]

## 2022-05-24 ENCOUNTER — TELEPHONE (OUTPATIENT)
Dept: INTERNAL MEDICINE | Facility: CLINIC | Age: 47
End: 2022-05-24

## 2022-05-24 NOTE — TELEPHONE ENCOUNTER
Caller: Jose Pace    Relationship: Self    What orders are you requesting (i.e. lab or imaging): Needs New Nebulizer Hoses    Where will you receive your lab/imaging services: [facility] [place]

## 2022-05-25 ENCOUNTER — TREATMENT (OUTPATIENT)
Dept: PHYSICAL THERAPY | Facility: CLINIC | Age: 47
End: 2022-05-25

## 2022-05-25 DIAGNOSIS — G89.29 CHRONIC LEFT-SIDED THORACIC BACK PAIN: ICD-10-CM

## 2022-05-25 DIAGNOSIS — M54.50 LUMBAR BACK PAIN: ICD-10-CM

## 2022-05-25 DIAGNOSIS — M54.6 CHRONIC LEFT-SIDED THORACIC BACK PAIN: ICD-10-CM

## 2022-05-25 DIAGNOSIS — M51.9 LUMBAR DISC DISEASE: Primary | ICD-10-CM

## 2022-05-25 PROCEDURE — 97140 MANUAL THERAPY 1/> REGIONS: CPT

## 2022-05-25 PROCEDURE — 97110 THERAPEUTIC EXERCISES: CPT

## 2022-05-25 NOTE — PROGRESS NOTES
Physical Therapy Treatment Note and 30 Day Progress Note    Patient: Jose Pace                                                                     Visit Date: 2022  :     1975    Referring practitioner:    ABIMBOLA Campoverde  Date of Initial Visit:          Type: THERAPY  Noted: 2022    Patient seen for 7 sessions    Visit Diagnoses:    ICD-10-CM ICD-9-CM   1. Lumbar disc disease  M51.9 722.93   2. Lumbar back pain  M54.50 724.2   3. Chronic left-sided thoracic back pain  M54.6 724.1    G89.29 338.29     SUBJECTIVE     Subjective He overall feels about the same since starting therapy, he is tired of his back hurting. He feels very tight this morning.     PAIN: 4/10    PT G-Codes  Outcome Measure Options: Modified Oswestry  Modified Oswestry Score/Comments:     OBJECTIVE     Objective      Manual Therapy     87897  Comments   STM B lower lumbar region, with massage cream  Prone, L paraspinals moderately guarded and tender    Thoracic extension mobilizations Grade 2 prone               Timed Minutes 30        Therapeutic Exercises    94963 Comments   B hip MMT    Lumbar AROM     Prone rectus femoris stretch, passive Increased pain in lumbar spine            Timed Minutes 10       Therapy Education/Self Care 79152   Details: Continue to work on HEP and when working out at the gym be aware of posture and make sure not to do exercises that cause increase pain. Stretch latissimus.   Date last updated:    Given postural retraining   Progress: Reinforced and new   Education provided to:  Patient   Level of understanding Verbalized   Timed Minutes      Access Code: [email or web address]  URL: [email or web address]  Date: 2022  Prepared by: Berenice Garcia     Program Notes  Sit it a straight back chair with a towel roll along spine.        Exercises  Supine Posterior Pelvic Tilt - 2 x daily - 7 x weekly - 1 sets - 10 reps  Bent Knee  Fallouts with Alternating Legs - 2 x daily - 7 x weekly - 3 sets - 10 reps  Correct Standing Posture - 7 x weekly  Supine Piriformis Stretch with Foot on Ground - 2 x daily - 7 x weekly - 1 sets - 5 reps  Seated Table Hamstring Stretch - 2 x daily - 7 x weekly - 1 sets - 5 reps  Beginner Clam - 2 x daily - 7 x weekly - 1 sets - 10 reps  Child's Pose Stretch - 2 x daily - 7 x weekly - 1 sets - 3 reps - 15 second hold        Total Timed Treatment:     40   mins  Total Time of Visit:             40   mins         ASSESSMENT/PLAN     GOALS  Goals                                                  Progress Note: 5/26/22                                                               Re-Cert due: 7/24/22     STG by: 3 weeks Comments Status Date   Pt to demonstrate decreased lumbar paraspinal muscle guarding upon palpation.  Moderate muscle guarding along L lumbar paraspinals  5/25 Ongoing                       LTG by: 6 weeks         Pt to demonstrate independence with comprehensive HEP. He has been compliant, progressing as appropriate  5/25 Ongoing   Pt to demonstrate B hip abductor strength of 4+/5.  L 4/5  R 4+/5 5/25 Partially met    Pt to demonstrate B glute max strength of 4+/5.  L 4/5  R 4+/5 5/25 Partially met    Pt demonstrates pain free lumbar ROM in all planes.   Very painful along L side during forward flexion and L lateral flexion  5/25 ongoing    Pt to score < 5/50 on the SUPRIYA.  12/50 today   14/50 on eval  5/25 Ongoing        Assessment & Plan     Assessment  Impairments: abnormal or restricted ROM, activity intolerance, impaired physical strength, lacks appropriate home exercise program and pain with function  Functional Limitations: lifting, walking, pulling, pushing, uncomfortable because of pain, standing, stooping and unable to perform repetitive tasksPrognosis: good    Plan  Therapy options: will be seen for skilled therapy services  Planned modality interventions: dry needling, TENS and low level laser  therapy  Planned therapy interventions: manual therapy, motor coordination training, neuromuscular re-education, balance/weight-bearing training, body mechanics training, postural training, soft tissue mobilization, spinal/joint mobilization, flexibility, functional ROM exercises, strengthening, stretching, gait training, home exercise program, therapeutic activities and joint mobilization  Frequency: 2x week  Duration in weeks: 6  Treatment plan discussed with: patient  Plan details:             ASSESSMENT: Mr. Pace has partially met two goals indicating improved hip strength. His L hip is still notably weak, which is like contributing to the ongoing low back pain and muscle tightness due to overcompensating. He responded well to STM today, however reports no lasting relief. Kevin Jack performed dry needling trial, please refer to his note for specifics. Given Mr. Pace' chronic low back pain and muscle spasms, it can be expected that it would take time for him to notice lasting improvements. We will continue to address his muscle imbalances.     PLAN: Consider implementing core and hip stability exercises to reduce strain on lumbar spine.     SIGNATURE: Jimena Masterson PT DPT, [place] License #:[id number]  Electronically Signed on 5/25/2022        [place]pta. [id number]  [phone]

## 2022-05-25 NOTE — PROGRESS NOTES
"                                                                        Physical Therapy Dry Needle Treatment Note    Patient: Jose Pace                                                                                Today's Date: 2022    : 1975  Date of Initial Visit: Type: THERAPY  Noted: 2022  Patient seen for 7 sessions    Visit Diagnoses:    ICD-10-CM ICD-9-CM   1. Lumbar disc disease  M51.9 722.93   2. Lumbar back pain  M54.50 724.2   3. Chronic left-sided thoracic back pain  M54.6 724.1    G89.29 338.29       SUBJECTIVE   Subjective  SUBJECTIVE: See Jimena Masterson PT about subjective and pain.      OBJECTIVE   Objective    Dry Needle Location [ (1-2 muscles)/ (3 or more muscles)]   Location Depth (\") Comment   B L3-L5 2    B superior cluneals  2    B gluteals 2      TRIAL   Time 10       Total Timed Treatment:     0   mins  Total Visit Time:     10   mins    Education: Patient instructed on the expected effects and possible reactions of dry needling. The patient reported understanding of this.     ASSESSMENT/PLAN     Assessment/Plan    ASSESSMENT: Trial of dry needling performed this date on the lumbar spine and gluteals. He had a couple tender spots on the gluteals bilaterally. Skin intact no adverse reactions.     PLAN: Assess response and continue with Dry Needling as requested.      SIGNATURE: Kevin Santiago PT DPT, [place] License #: [id number]  Electronically Signed on 2022      "

## 2022-05-31 ENCOUNTER — TREATMENT (OUTPATIENT)
Dept: PHYSICAL THERAPY | Facility: CLINIC | Age: 47
End: 2022-05-31

## 2022-05-31 DIAGNOSIS — G89.29 CHRONIC LEFT-SIDED THORACIC BACK PAIN: ICD-10-CM

## 2022-05-31 DIAGNOSIS — M51.9 LUMBAR DISC DISEASE: Primary | ICD-10-CM

## 2022-05-31 DIAGNOSIS — M54.6 CHRONIC LEFT-SIDED THORACIC BACK PAIN: ICD-10-CM

## 2022-05-31 DIAGNOSIS — M54.50 LUMBAR BACK PAIN: ICD-10-CM

## 2022-05-31 PROCEDURE — 97110 THERAPEUTIC EXERCISES: CPT

## 2022-05-31 PROCEDURE — 97140 MANUAL THERAPY 1/> REGIONS: CPT

## 2022-05-31 NOTE — PROGRESS NOTES
Physical Therapy Treatment Note    Patient: Jose Pace                                                                     Visit Date: 2022  :     1975    Referring practitioner:    ABIMBOLA Campoverde  Date of Initial Visit:          Type: THERAPY  Noted: 2022    Patient seen for 8 sessions    Visit Diagnoses:    ICD-10-CM ICD-9-CM   1. Lumbar disc disease  M51.9 722.93   2. Lumbar back pain  M54.50 724.2   3. Chronic left-sided thoracic back pain  M54.6 724.1    G89.29 338.29     SUBJECTIVE     Subjective He felt really good for the rest of the day following the DN. He mowed his yard and then afterwards had intense back muscle tightness and pain. He even noted that his tailbone was painful to sit down. Feeling a little better now.     PAIN: 3/10         OBJECTIVE     Objective      Manual Therapy     [id number]  Comments   STM B lower lumbar region, with massage cream  Prone, L paraspinals moderately guarded and tender   Pillow under stomach    Obturator externus STM, bilateral Prone    Prone B hip IR/ER mobs     Sacral mobs, rotation  Prone        Timed Minutes 30        Therapeutic Exercises    87848 Comments   Prone rectus femoris stretch, passive Increased pain in lumbar spine    LTR with 45 cm SB 5 sec hold x 10 each side    PPT, HL 2 x 10 with 5 sec hold    Seated forward flexion thoracolumbar stretch with 55 cm SB    Seated lateral flexion thoracolumbar stretch with 55 cm SB    Seated piriformis stretch     Timed Minutes 15       Therapy Education/Self Care 18796   Details: Continue to work on HEP and when working out at the gym be aware of posture and make sure not to do exercises that cause increase pain. Stretch latissimus.   Date last updated:    Given postural retraining   Progress: Reinforced and new   Education provided to:  Patient   Level of understanding Verbalized   Timed Minutes      Access Code: [email or web address]  URL:  [email or web address]  Date: 05/11/2022  Prepared by: Berenice Garcia     Program Notes  Sit it a straight back chair with a towel roll along spine.        Exercises  Supine Posterior Pelvic Tilt - 2 x daily - 7 x weekly - 1 sets - 10 reps  Bent Knee Fallouts with Alternating Legs - 2 x daily - 7 x weekly - 3 sets - 10 reps  Correct Standing Posture - 7 x weekly  Supine Piriformis Stretch with Foot on Ground - 2 x daily - 7 x weekly - 1 sets - 5 reps  Seated Table Hamstring Stretch - 2 x daily - 7 x weekly - 1 sets - 5 reps  Beginner Clam - 2 x daily - 7 x weekly - 1 sets - 10 reps  Child's Pose Stretch - 2 x daily - 7 x weekly - 1 sets - 3 reps - 15 second hold        Total Timed Treatment:     45   mins  Total Time of Visit:             45   mins         ASSESSMENT/PLAN     GOALS  Goals                                                  Progress Note: 6/24/2022                                                               Re-Cert due: 7/24/22     STG by: 3 weeks Comments Status Date   Pt to demonstrate decreased lumbar paraspinal muscle guarding upon palpation.  Moderate muscle guarding along L lumbar paraspinals  5/25 Ongoing                       LTG by: 6 weeks         Pt to demonstrate independence with comprehensive HEP. He has been compliant, progressing as appropriate  5/25 Ongoing   Pt to demonstrate B hip abductor strength of 4+/5.  L 4/5  R 4+/5 5/25 Partially met    Pt to demonstrate B glute max strength of 4+/5.  L 4/5  R 4+/5 5/25 Partially met    Pt demonstrates pain free lumbar ROM in all planes.  Ongoing pain with mobility activities  5/31 ongoing    Pt to score < 5/50 on the SUPRIYA.  12/50 today   14/50 on eval  5/25 Ongoing        Assessment/Plan     ASSESSMENT: Unfortunately his low back pain on the L side was flared by end of session. He did respond well to gentle stretches and reported some relief. He described symptoms of tightness surrounding his tailbone along with difficulty urinating for a  period of time. No current numbness or tingling, his symptoms seem consistent with pelvic floor spams.     PLAN: Consider address for pelvic floor involvement as appropriate. Review stretches and exercises for HEP.      SIGNATURE: Jimena Masterson PT DPT, [place] License #:[id number]  Electronically Signed on 5/31/2022        [address] [place] [address], [place] [address]  [phone]

## 2022-06-03 ENCOUNTER — TREATMENT (OUTPATIENT)
Dept: PHYSICAL THERAPY | Facility: CLINIC | Age: 47
End: 2022-06-03

## 2022-06-03 DIAGNOSIS — M54.6 CHRONIC LEFT-SIDED THORACIC BACK PAIN: ICD-10-CM

## 2022-06-03 DIAGNOSIS — M54.50 LUMBAR BACK PAIN: ICD-10-CM

## 2022-06-03 DIAGNOSIS — M51.9 LUMBAR DISC DISEASE: Primary | ICD-10-CM

## 2022-06-03 DIAGNOSIS — G89.29 CHRONIC LEFT-SIDED THORACIC BACK PAIN: ICD-10-CM

## 2022-06-03 PROCEDURE — 97110 THERAPEUTIC EXERCISES: CPT

## 2022-06-03 PROCEDURE — 97140 MANUAL THERAPY 1/> REGIONS: CPT

## 2022-06-03 NOTE — PROGRESS NOTES
Physical Therapy Treatment Note    Patient: Jose Pace                                                                     Visit Date: 6/3/2022  :     1975    Referring practitioner:    ABIMBOLA Campoverde  Date of Initial Visit:          Type: THERAPY  Noted: 2022    Patient seen for 9 sessions    Visit Diagnoses:    ICD-10-CM ICD-9-CM   1. Lumbar disc disease  M51.9 722.93   2. Lumbar back pain  M54.50 724.2   3. Chronic left-sided thoracic back pain  M54.6 724.1    G89.29 338.29     SUBJECTIVE     Subjective He did a light workout at the gym this morning, he is now fatigued. He feels his flexibility is improving in back and hips which he is pleased with.     PAIN: 5/10         OBJECTIVE     Objective      Manual Therapy     96818  Comments   STM B lower lumbar region, with massage cream  Prone, L paraspinals moderately guarded and tender   Pillow under stomach    Obturator externus STM, R  Prone    Prone R hip IR/ER mobs     Sacral mobs,  Distraction and rotation  Prone    IASTM with double lacrosse ball L thoracolumbar paraspinals and R gluteal  Prone    Timed Minutes 35        Therapeutic Exercises    57652 Comments   B hip passive stretching into flexion, IR/ER, hamstring and piriformis     MET technique for pelvic alignment Improved alignment, no change in pain                    Timed Minutes 10       Therapy Education/Self Care 80451   Details: Reviewed stretches today, advised to cease performance should he have increased pain.    Date last updated: 6/3/22   Given postural retraining   Progress: Reinforced and new   Education provided to:  Patient   Level of understanding Verbalized   Timed Minutes      Access Code: [email or web address]  URL: [email or web address]  Date: 2022  Prepared by: Berenice Garcia     Program Notes  Sit it a straight back chair with a towel roll along spine.        Exercises  Supine Posterior Pelvic  Tilt - 2 x daily - 7 x weekly - 1 sets - 10 reps  Bent Knee Fallouts with Alternating Legs - 2 x daily - 7 x weekly - 3 sets - 10 reps  Correct Standing Posture - 7 x weekly  Supine Piriformis Stretch with Foot on Ground - 2 x daily - 7 x weekly - 1 sets - 5 reps  Seated Table Hamstring Stretch - 2 x daily - 7 x weekly - 1 sets - 5 reps  Beginner Clam - 2 x daily - 7 x weekly - 1 sets - 10 reps  Child's Pose Stretch - 2 x daily - 7 x weekly - 1 sets - 3 reps - 15 second hold        Total Timed Treatment:     45   mins  Total Time of Visit:             45   mins         ASSESSMENT/PLAN     GOALS  Goals                                                  Progress Note: 6/24/2022                                                               Re-Cert due: 7/24/22     STG by: 3 weeks Comments Status Date   Pt to demonstrate decreased lumbar paraspinal muscle guarding upon palpation.  No trigger points today, L was mildly guarded compared to R.  6/3 Ongoing                       LTG by: 6 weeks         Pt to demonstrate independence with comprehensive HEP. He has been compliant, progressing as appropriate  5/25 Ongoing   Pt to demonstrate B hip abductor strength of 4+/5.  L 4/5  R 4+/5 5/25 Partially met    Pt to demonstrate B glute max strength of 4+/5.  L 4/5  R 4+/5 5/25 Partially met    Pt demonstrates pain free lumbar ROM in all planes.  Ongoing pain with mobility activities  5/31 ongoing    Pt to score < 5/50 on the SUPRIYA.  12/50 today   14/50 on eval  5/25 Ongoing        Assessment/Plan     ASSESSMENT: He appears to be dealing with muscle imbalances, specifically the L lumbar paraspinals and R gluteals demonstrating increased tightness. His R piriformis and obturator externus had multiple muscle spasms during STM, however did improve with gradual progression of applied pressure. He has gotten the most relief from dry needling, therefore would like to try DN again.     PLAN: Utilzie DN as appropriate. Continue addressing  lumbar and gluteal soft tissue restrictions     SIGNATURE: Jimena Masterson, PT DPT, [place] License #:[id number]  Electronically Signed on 6/3/2022        [address] [place] [address]  [phone]

## 2022-06-06 ENCOUNTER — OFFICE VISIT (OUTPATIENT)
Dept: INTERNAL MEDICINE | Facility: CLINIC | Age: 47
End: 2022-06-06

## 2022-06-06 VITALS
OXYGEN SATURATION: 98 % | HEIGHT: 65 IN | HEART RATE: 84 BPM | RESPIRATION RATE: 16 BRPM | BODY MASS INDEX: 22.63 KG/M2 | SYSTOLIC BLOOD PRESSURE: 126 MMHG | DIASTOLIC BLOOD PRESSURE: 82 MMHG | TEMPERATURE: 97.8 F | WEIGHT: 135.8 LBS

## 2022-06-06 DIAGNOSIS — J45.909 ASTHMA DUE TO ENVIRONMENTAL ALLERGIES: Chronic | ICD-10-CM

## 2022-06-06 DIAGNOSIS — J30.2 SEASONAL ALLERGIES: ICD-10-CM

## 2022-06-06 DIAGNOSIS — J45.31 MILD PERSISTENT ASTHMA WITH EXACERBATION: Primary | ICD-10-CM

## 2022-06-06 PROCEDURE — 99214 OFFICE O/P EST MOD 30 MIN: CPT | Performed by: INTERNAL MEDICINE

## 2022-06-06 RX ORDER — PREDNISONE 20 MG/1
TABLET ORAL
Qty: 9 TABLET | Refills: 0 | Status: SHIPPED | OUTPATIENT
Start: 2022-06-06 | End: 2022-06-12

## 2022-06-06 RX ORDER — BECLOMETHASONE DIPROPIONATE HFA 80 UG/1
2 AEROSOL, METERED RESPIRATORY (INHALATION) 2 TIMES DAILY
Qty: 10.6 G | Refills: 11 | Status: SHIPPED | OUTPATIENT
Start: 2022-06-06 | End: 2022-10-14

## 2022-06-06 RX ORDER — LORATADINE 10 MG/1
10 TABLET ORAL DAILY
Qty: 30 TABLET | Refills: 11 | Status: SHIPPED | OUTPATIENT
Start: 2022-06-06

## 2022-06-06 NOTE — PROGRESS NOTES
CC: asthma flare    History:  Jose Pace is a 47 y.o. male   He notes he has been doing reasonably well, but has been having an asthma flare over the last few days.  He took 1 dose of prednisone that he had leftover, which helped him somewhat.  He has not been able to get Qvar from his pharmacy, but has been using his breathing treatments and Spiriva.       ROS:  Review of Systems   Constitutional: Negative for fatigue.   Respiratory: Positive for cough, shortness of breath and wheezing.    Cardiovascular: Negative for chest pain.        reports that he quit smoking about 13 years ago. His smoking use included cigarettes. He has a 1.25 pack-year smoking history. He has never used smokeless tobacco. He reports current alcohol use. He reports current drug use. Drug: Marijuana.      Current Outpatient Medications:   •  albuterol (PROVENTIL) (2.5 MG/3ML) 0.083% nebulizer solution, Take 2.5 mg by nebulization Every 4 (Four) Hours As Needed for Wheezing. (Patient taking differently: Take 2.5 mg by nebulization Every 4 (Four) Hours As Needed for Wheezing. Dr Tello), Disp: 30 vial, Rfl: 0  •  albuterol sulfate  (90 Base) MCG/ACT inhaler, Inhale 2 puffs 4 (Four) Times a Day As Needed for Wheezing. (Patient taking differently: Inhale 2 puffs 4 (Four) Times a Day As Needed for Wheezing. Dr Tello), Disp: 18 g, Rfl: 11  •  ALLERGY SERUM INJECTION, Inject  under the skin into the appropriate area as directed 1 (One) Time., Disp: , Rfl:   •  azelastine (ASTELIN) 0.1 % nasal spray, 2 sprays into the nostril(s) as directed by provider 2 (Two) Times a Day. Use in each nostril as directed, Disp: 30 mL, Rfl: 3  •  cyclobenzaprine (FLEXERIL) 10 MG tablet, Take 1 tablet by mouth 3 (Three) Times a Day As Needed for Muscle Spasms., Disp: 30 tablet, Rfl: 0  •  fluticasone (Flonase) 50 MCG/ACT nasal spray, 2 sprays into the nostril(s) as directed by provider Daily. 2 puffs each nostril, Disp: 16 g, Rfl: 3  •  ibuprofen  "(ADVIL,MOTRIN) 600 MG tablet, Take 1 tablet by mouth Every 6 (Six) Hours As Needed for Mild Pain ., Disp: 30 tablet, Rfl: 0  •  ondansetron ODT (Zofran ODT) 4 MG disintegrating tablet, Place 1 tablet on the tongue Every 8 (Eight) Hours As Needed for Nausea or Vomiting., Disp: 20 tablet, Rfl: 0  •  promethazine (PHENERGAN) 12.5 MG tablet, Take 1 tablet by mouth Every 6 (Six) Hours As Needed for Nausea or Vomiting., Disp: 15 tablet, Rfl: 1  •  Qvar RediHaler 80 MCG/ACT inhaler, Inhale 2 puffs 2 (Two) Times a Day., Disp: 10.6 g, Rfl: 11  •  Spiriva HandiHaler 18 MCG per inhalation capsule, Place 1 capsule into inhaler and inhale Daily., Disp: , Rfl:   •  predniSONE (DELTASONE) 20 MG tablet, Take 2 tablets by mouth Daily for 3 days, THEN 1 tablet Daily for 3 days., Disp: 9 tablet, Rfl: 0    OBJECTIVE:  /82 (BP Location: Left arm, Patient Position: Sitting, Cuff Size: Adult)   Pulse 84   Temp 97.8 °F (36.6 °C)   Resp 16   Ht 165.1 cm (65\")   Wt 61.6 kg (135 lb 12.8 oz)   SpO2 98%   BMI 22.60 kg/m²    Physical Exam  Cardiovascular:      Rate and Rhythm: Normal rate and regular rhythm.      Heart sounds: No murmur heard.  Pulmonary:      Effort: Pulmonary effort is normal. No respiratory distress.      Breath sounds: Wheezing and rales present.         Assessment/Plan     Diagnoses and all orders for this visit:    1. Mild persistent asthma with exacerbation (Primary)  2. Asthma due to environmental allergies  -     Qvar RediHaler 80 MCG/ACT inhaler; Inhale 2 puffs 2 (Two) Times a Day.  Dispense: 10.6 g; Refill: 11  -     predniSONE (DELTASONE) 20 MG tablet; Take 2 tablets by mouth Daily for 3 days, THEN 1 tablet Daily for 3 days.  Dispense: 9 tablet; Refill: 0  He needs to get Qvar and will prescribe prednisone to assist with exacerbation.     3. Seasonal allergies  -     loratadine (Claritin) 10 MG tablet; Take 1 tablet by mouth Daily.  Dispense: 30 tablet; Refill: 11  Claritin for allergies as he has not " been able to get consistently.       An After Visit Summary was printed and given to the patient at discharge.  Return for Next scheduled follow up.          Reji Hardy D.O. 6/6/2022   Electronically signed.

## 2022-06-07 ENCOUNTER — TREATMENT (OUTPATIENT)
Dept: PHYSICAL THERAPY | Facility: CLINIC | Age: 47
End: 2022-06-07

## 2022-06-07 DIAGNOSIS — M51.9 LUMBAR DISC DISEASE: Primary | ICD-10-CM

## 2022-06-07 DIAGNOSIS — M54.50 LUMBAR BACK PAIN: ICD-10-CM

## 2022-06-07 DIAGNOSIS — G89.29 CHRONIC LEFT-SIDED THORACIC BACK PAIN: ICD-10-CM

## 2022-06-07 DIAGNOSIS — M54.6 CHRONIC LEFT-SIDED THORACIC BACK PAIN: ICD-10-CM

## 2022-06-07 PROCEDURE — 97014 ELECTRIC STIMULATION THERAPY: CPT | Performed by: PHYSICAL THERAPIST

## 2022-06-07 PROCEDURE — 97110 THERAPEUTIC EXERCISES: CPT

## 2022-06-07 PROCEDURE — 97140 MANUAL THERAPY 1/> REGIONS: CPT

## 2022-06-07 PROCEDURE — 20561 NDL INSJ W/O NJX 3+ MUSC: CPT | Performed by: PHYSICAL THERAPIST

## 2022-06-07 NOTE — PROGRESS NOTES
Physical Therapy Treatment Note    Patient: Jose Pace                                                                     Visit Date: 2022  :     1975    Referring practitioner:    ABIMBOLA Campoverde  Date of Initial Visit:          Type: THERAPY  Noted: 2022    Patient seen for 10 sessions    Visit Diagnoses:    ICD-10-CM ICD-9-CM   1. Lumbar disc disease  M51.9 722.93   2. Lumbar back pain  M54.50 724.2   3. Chronic left-sided thoracic back pain  M54.6 724.1    G89.29 338.29     SUBJECTIVE     Subjective Patient is feeling really good today, pain level has decreased and he relates this to his stretching. He is looking forward to dry needling.     PAIN: 3-4/10         OBJECTIVE     Objective      Manual Therapy     92813  Comments   STM B lower lumbar region and mid thoracic, with massage cream  Prone, L paraspinals moderately guarded and tender   Pillow under stomach    Timed Minutes 20        Therapeutic Exercises    61057 Comments   B hip passive stretching into flexion, IR/ER, hamstring and piriformis         Timed Minutes 10       Therapy Education/Self Care 83538   Details: Reviewed stretches today, advised to cease performance should he have increased pain.    Date last updated: 6/3/22   Given postural retraining   Progress: Reinforced and new   Education provided to:  Patient   Level of understanding Verbalized   Timed Minutes      Access Code: [email or web address]  URL: [email or web address]  Date: 2022  Prepared by: Berenice Garcia     Program Notes  Sit it a straight back chair with a towel roll along spine.        Exercises  Supine Posterior Pelvic Tilt - 2 x daily - 7 x weekly - 1 sets - 10 reps  Bent Knee Fallouts with Alternating Legs - 2 x daily - 7 x weekly - 3 sets - 10 reps  Correct Standing Posture - 7 x weekly  Supine Piriformis Stretch with Foot on Ground - 2 x daily - 7 x weekly - 1 sets - 5 reps  Seated  Table Hamstring Stretch - 2 x daily - 7 x weekly - 1 sets - 5 reps  Beginner Clam - 2 x daily - 7 x weekly - 1 sets - 10 reps  Child's Pose Stretch - 2 x daily - 7 x weekly - 1 sets - 3 reps - 15 second hold        Total Timed Treatment:     30   mins  Total Time of Visit:             55   mins         ASSESSMENT/PLAN     GOALS  Goals                                                  Progress Note: 6/24/2022                                                               Re-Cert due: 7/24/22     STG by: 3 weeks Comments Status Date   Pt to demonstrate decreased lumbar paraspinal muscle guarding upon palpation.  No trigger points today, L was mildly guarded compared to R.  6/3 Ongoing                       LTG by: 6 weeks         Pt to demonstrate independence with comprehensive HEP. He has been compliant, progressing as appropriate  5/25 Ongoing   Pt to demonstrate B hip abductor strength of 4+/5.  L 4/5  R 4+/5 5/25 Partially met    Pt to demonstrate B glute max strength of 4+/5.  L 4/5  R 4+/5 5/25 Partially met    Pt demonstrates pain free lumbar ROM in all planes.  Able to touch touch toes at end of session with no pain  6/7 Ongoing    Pt to score < 5/50 on the SUPRIYA.  12/50 today   14/50 on eval  5/25 Ongoing        Assessment/Plan     ASSESSMENT: Refer to Otilio Hester's assessment for dry needling, which was performed at start of session. Focused on spinal soft tissue restrictions, most notable along B thoracic paraspinals. He reported having an asthma attack and increased cough over the weekend, which could contribute to his now thoracic muscle spasms. Fortunately he responded well to all interventions and demonstrated improved lumbar AROM.     PLAN:  Continue addressing lumbar and gluteal soft tissue restrictions. Consider progressing to standing hip exercises.      SIGNATURE: Jimena Masterson, PT DPT, [place] License #:[id number]  Electronically Signed on 6/7/2022        [address] [place] [address] [place] [address]  [phone]

## 2022-06-07 NOTE — PROGRESS NOTES
"                                                                        Physical Therapy Dry Needle Treatment Note    Patient: Jose Pace                                                                                Today's Date: 2022    : 1975  Date of Initial Visit: Type: THERAPY  Noted: 2022  Patient seen for 10 sessions    Visit Diagnoses:    ICD-10-CM ICD-9-CM   1. Lumbar disc disease  M51.9 722.93   2. Lumbar back pain  M54.50 724.2   3. Chronic left-sided thoracic back pain  M54.6 724.1    G89.29 338.29       SUBJECTIVE   Subjective  SUBJECTIVE: He says he is still having pain but he felt better after his last visit.     Pain: see other note    OBJECTIVE   Objective    Dry Needle Location [ (1-2 muscles)/ (3 or more muscles)]   Location Depth (\") Comment   dafne L2-5 post cut 2    dafne T12-L1 post cut 1    Right sup cluneal 3    Right inf gluteal 3                        Time 20     Modalities Comments   ES-130 estim Ch1,2: dafne T12, L5 post cut  Mode: M, Freq: 10  Intensity: 3-4       Minutes 10         Total Timed Treatment:     0   mins  Total Visit Time:     25   mins    Education: Patient instructed on the expected effects and possible reactions of dry needling. The patient reported understanding of this.     ASSESSMENT/PLAN     Assessment/Plan    ASSESSMENT: He was very guarded. I was going to go higher into his thoracic but he was too painful there to pursue.     PLAN: Continue with Dry Needling as requested.      SIGNATURE: Otilio Hester, PT, [place] License #: [id number]  Electronically Signed on 2022      "

## 2022-06-09 ENCOUNTER — TREATMENT (OUTPATIENT)
Dept: PHYSICAL THERAPY | Facility: CLINIC | Age: 47
End: 2022-06-09

## 2022-06-09 DIAGNOSIS — M51.9 LUMBAR DISC DISEASE: Primary | ICD-10-CM

## 2022-06-09 DIAGNOSIS — M54.50 LUMBAR BACK PAIN: ICD-10-CM

## 2022-06-09 DIAGNOSIS — G89.29 CHRONIC LEFT-SIDED THORACIC BACK PAIN: ICD-10-CM

## 2022-06-09 DIAGNOSIS — M54.6 CHRONIC LEFT-SIDED THORACIC BACK PAIN: ICD-10-CM

## 2022-06-09 PROCEDURE — 97140 MANUAL THERAPY 1/> REGIONS: CPT | Performed by: PHYSICAL THERAPIST

## 2022-06-09 NOTE — PROGRESS NOTES
"                                                                Physical Therapy Treatment Note    Patient: Jose Pace                                                                     Visit Date: 2022  :     1975    Referring practitioner:    ABIMBOLA Campoverde  Date of Initial Visit:          Type: THERAPY  Noted: 2022    Patient seen for 11 sessions    Visit Diagnoses:    ICD-10-CM ICD-9-CM   1. Lumbar disc disease  M51.9 722.93   2. Lumbar back pain  M54.50 724.2   3. Chronic left-sided thoracic back pain  M54.6 724.1    G89.29 338.29     SUBJECTIVE     Subjective Patient reports feeling \"alright\" today, pain level has stayed consistent but reports increased pain in L lower lumbar region.     PAIN: 4/10         OBJECTIVE     Objective      Manual Therapy     [id number]  Comments   STM B lower lumbar region and mid thoracic, with massage cream  Prone, B paraspinals moderately guarded and tender, B rhomboids tender  Pillow under feet   CT distractions  Bilaterally    CPA mobilizations lumbar through mid thoracic Grades 1-3 gradual increase in pressure   Hip distraction  L    Timed Minutes 45        Therapeutic Exercises    [id number] Comments   Cat/Cow    Prayer stretch with lateral bend  Bilaterally    Timed Minutes 5       Therapy Education/Self Care [id number]   Details: Reviewed stretches today, advised to take stretches slow and try to relax instead of trying to create a pop   Educated to try lumbar roll stretch for left lower lumbar.    Date last updated: 22   Given postural retraining   Progress: Reinforced and new   Education provided to:  Patient   Level of understanding Verbalized   Timed Minutes      Access Code: [email or web address]  URL: [email or web address]  Date: 2022  Prepared by: Berenice Garcia     Program Notes  Sit it a straight back chair with a towel roll along spine.        Exercises  Supine Posterior Pelvic Tilt - 2 x daily - 7 x weekly - 1 sets - 10 reps  Bent Knee Fallouts " with Alternating Legs - 2 x daily - 7 x weekly - 3 sets - 10 reps  Correct Standing Posture - 7 x weekly  Supine Piriformis Stretch with Foot on Ground - 2 x daily - 7 x weekly - 1 sets - 5 reps  Seated Table Hamstring Stretch - 2 x daily - 7 x weekly - 1 sets - 5 reps  Beginner Clam - 2 x daily - 7 x weekly - 1 sets - 10 reps  Child's Pose Stretch - 2 x daily - 7 x weekly - 1 sets - 3 reps - 15 second hold        Total Timed Treatment:     50  mins  Total Time of Visit:             50  mins         ASSESSMENT/PLAN     GOALS  Goals                                                  Progress Note: 6/24/2022                                                               Re-Cert due: 7/24/22     STG by: 3 weeks Comments Status Date   Pt to demonstrate decreased lumbar paraspinal muscle guarding upon palpation.  No trigger points today, L was mildly guarded compared to R.  6/3 Ongoing                       LTG by: 6 weeks         Pt to demonstrate independence with comprehensive HEP. He has been compliant, progressing as appropriate  5/25 Ongoing   Pt to demonstrate B hip abductor strength of 4+/5.  L 4/5  R 4+/5 5/25 Partially met    Pt to demonstrate B glute max strength of 4+/5.  L 4/5  R 4+/5 5/25 Partially met    Pt demonstrates pain free lumbar ROM in all planes.  Able to touch touch toes at end of session with no pain  6/7 Ongoing    Pt to score < 5/50 on the SUPRIYA.  12/50 today   14/50 on eval  5/25 Ongoing        Assessment/Plan     ASSESSMENT: Pt tolerated manual therapy well, however reported an increase in pain after standing up. Pt c/o L lumbar pain and was given stretches to do at home.     PLAN:  Continue addressing lumbar and gluteal soft tissue restrictions. Consider progressing to standing hip exercises.      SIGNATURE: Shelley Perez, SPT    The clinical instructor and/or supervising staff, Otilio Hester, PT, was present in clinic guiding the visit by approving, concurring, and confirming the skilled  judgement for all services rendered.    Signature:  Otilio Hester PT, License/Certification#: [id number]  Electronically signed on 6/9/2022    Electronically Signed on 6/9/2022        [address] [place] [address] [place] [address]  [phone]

## 2022-07-11 ENCOUNTER — APPOINTMENT (OUTPATIENT)
Dept: GENERAL RADIOLOGY | Facility: HOSPITAL | Age: 47
End: 2022-07-11

## 2022-07-11 ENCOUNTER — HOSPITAL ENCOUNTER (EMERGENCY)
Facility: HOSPITAL | Age: 47
Discharge: HOME | End: 2022-07-12
Attending: INTERNAL MEDICINE | Admitting: INTERNAL MEDICINE

## 2022-07-11 DIAGNOSIS — J06.9 UPPER RESPIRATORY INFECTION, ACUTE: Primary | ICD-10-CM

## 2022-07-11 PROCEDURE — 71045 X-RAY EXAM CHEST 1 VIEW: CPT

## 2022-07-11 PROCEDURE — 93010 ELECTROCARDIOGRAM REPORT: CPT | Performed by: EMERGENCY MEDICINE

## 2022-07-11 PROCEDURE — 99283 EMERGENCY DEPT VISIT LOW MDM: CPT

## 2022-07-11 PROCEDURE — 93005 ELECTROCARDIOGRAM TRACING: CPT | Performed by: INTERNAL MEDICINE

## 2022-07-12 VITALS
DIASTOLIC BLOOD PRESSURE: 83 MMHG | WEIGHT: 140 LBS | HEART RATE: 70 BPM | SYSTOLIC BLOOD PRESSURE: 125 MMHG | TEMPERATURE: 98.3 F | BODY MASS INDEX: 23.32 KG/M2 | HEIGHT: 65 IN | RESPIRATION RATE: 18 BRPM | OXYGEN SATURATION: 96 %

## 2022-07-12 LAB
ALBUMIN SERPL-MCNC: 5 G/DL (ref 3.5–5.2)
ALBUMIN/GLOB SERPL: 1.6 G/DL
ALP SERPL-CCNC: 89 U/L (ref 39–117)
ALT SERPL W P-5'-P-CCNC: 36 U/L (ref 1–41)
ANION GAP SERPL CALCULATED.3IONS-SCNC: 13 MMOL/L (ref 5–15)
AST SERPL-CCNC: 32 U/L (ref 1–40)
BASOPHILS # BLD AUTO: 0.12 10*3/MM3 (ref 0–0.2)
BASOPHILS NFR BLD AUTO: 0.7 % (ref 0–1.5)
BILIRUB SERPL-MCNC: 1 MG/DL (ref 0–1.2)
BUN SERPL-MCNC: 12 MG/DL (ref 6–20)
BUN/CREAT SERPL: 10.3 (ref 7–25)
CALCIUM SPEC-SCNC: 9.7 MG/DL (ref 8.6–10.5)
CHLORIDE SERPL-SCNC: 101 MMOL/L (ref 98–107)
CO2 SERPL-SCNC: 25 MMOL/L (ref 22–29)
CREAT SERPL-MCNC: 1.16 MG/DL (ref 0.76–1.27)
D DIMER PPP FEU-MCNC: <0.27 MCGFEU/ML (ref 0–0.5)
DEPRECATED RDW RBC AUTO: 38.7 FL (ref 37–54)
EGFRCR SERPLBLD CKD-EPI 2021: 79.2 ML/MIN/1.73
EOSINOPHIL # BLD AUTO: 0.67 10*3/MM3 (ref 0–0.4)
EOSINOPHIL NFR BLD AUTO: 3.8 % (ref 0.3–6.2)
ERYTHROCYTE [DISTWIDTH] IN BLOOD BY AUTOMATED COUNT: 11.9 % (ref 12.3–15.4)
GLOBULIN UR ELPH-MCNC: 3.2 GM/DL
GLUCOSE SERPL-MCNC: 113 MG/DL (ref 65–99)
HCT VFR BLD AUTO: 48.9 % (ref 37.5–51)
HGB BLD-MCNC: 15.5 G/DL (ref 13–17.7)
IMM GRANULOCYTES # BLD AUTO: 0.07 10*3/MM3 (ref 0–0.05)
IMM GRANULOCYTES NFR BLD AUTO: 0.4 % (ref 0–0.5)
LYMPHOCYTES # BLD AUTO: 1.82 10*3/MM3 (ref 0.7–3.1)
LYMPHOCYTES NFR BLD AUTO: 10.4 % (ref 19.6–45.3)
MCH RBC QN AUTO: 28.5 PG (ref 26.6–33)
MCHC RBC AUTO-ENTMCNC: 31.7 G/DL (ref 31.5–35.7)
MCV RBC AUTO: 89.9 FL (ref 79–97)
MONOCYTES # BLD AUTO: 1.19 10*3/MM3 (ref 0.1–0.9)
MONOCYTES NFR BLD AUTO: 6.8 % (ref 5–12)
NEUTROPHILS NFR BLD AUTO: 13.64 10*3/MM3 (ref 1.7–7)
NEUTROPHILS NFR BLD AUTO: 77.9 % (ref 42.7–76)
NRBC BLD AUTO-RTO: 0 /100 WBC (ref 0–0.2)
NT-PROBNP SERPL-MCNC: 9.4 PG/ML (ref 0–450)
PLATELET # BLD AUTO: 280 10*3/MM3 (ref 140–450)
PMV BLD AUTO: 10 FL (ref 6–12)
POTASSIUM SERPL-SCNC: 5 MMOL/L (ref 3.5–5.2)
PROT SERPL-MCNC: 8.2 G/DL (ref 6–8.5)
RBC # BLD AUTO: 5.44 10*6/MM3 (ref 4.14–5.8)
SARS-COV-2 RNA PNL SPEC NAA+PROBE: NOT DETECTED
SODIUM SERPL-SCNC: 139 MMOL/L (ref 136–145)
TROPONIN T SERPL-MCNC: <0.01 NG/ML (ref 0–0.03)
WBC NRBC COR # BLD: 17.51 10*3/MM3 (ref 3.4–10.8)

## 2022-07-12 PROCEDURE — 94640 AIRWAY INHALATION TREATMENT: CPT

## 2022-07-12 PROCEDURE — 85025 COMPLETE CBC W/AUTO DIFF WBC: CPT | Performed by: INTERNAL MEDICINE

## 2022-07-12 PROCEDURE — 94664 DEMO&/EVAL PT USE INHALER: CPT

## 2022-07-12 PROCEDURE — 84484 ASSAY OF TROPONIN QUANT: CPT | Performed by: INTERNAL MEDICINE

## 2022-07-12 PROCEDURE — 96365 THER/PROPH/DIAG IV INF INIT: CPT

## 2022-07-12 PROCEDURE — 25010000002 METHYLPREDNISOLONE PER 125 MG: Performed by: INTERNAL MEDICINE

## 2022-07-12 PROCEDURE — 80053 COMPREHEN METABOLIC PANEL: CPT | Performed by: INTERNAL MEDICINE

## 2022-07-12 PROCEDURE — 87635 SARS-COV-2 COVID-19 AMP PRB: CPT | Performed by: INTERNAL MEDICINE

## 2022-07-12 PROCEDURE — 96375 TX/PRO/DX INJ NEW DRUG ADDON: CPT

## 2022-07-12 PROCEDURE — 94799 UNLISTED PULMONARY SVC/PX: CPT

## 2022-07-12 PROCEDURE — 85379 FIBRIN DEGRADATION QUANT: CPT | Performed by: INTERNAL MEDICINE

## 2022-07-12 PROCEDURE — 25010000002 CEFTRIAXONE PER 250 MG: Performed by: INTERNAL MEDICINE

## 2022-07-12 PROCEDURE — 83880 ASSAY OF NATRIURETIC PEPTIDE: CPT | Performed by: INTERNAL MEDICINE

## 2022-07-12 RX ORDER — PREDNISONE 20 MG/1
20 TABLET ORAL 2 TIMES DAILY
Qty: 10 TABLET | Refills: 0 | Status: SHIPPED | OUTPATIENT
Start: 2022-07-12 | End: 2022-08-22

## 2022-07-12 RX ORDER — CEFDINIR 300 MG/1
300 CAPSULE ORAL 2 TIMES DAILY
Qty: 10 CAPSULE | Refills: 0 | Status: SHIPPED | OUTPATIENT
Start: 2022-07-12 | End: 2022-08-22

## 2022-07-12 RX ORDER — METHYLPREDNISOLONE SODIUM SUCCINATE 125 MG/2ML
125 INJECTION, POWDER, LYOPHILIZED, FOR SOLUTION INTRAMUSCULAR; INTRAVENOUS ONCE
Status: COMPLETED | OUTPATIENT
Start: 2022-07-12 | End: 2022-07-12

## 2022-07-12 RX ORDER — IPRATROPIUM BROMIDE AND ALBUTEROL SULFATE 2.5; .5 MG/3ML; MG/3ML
3 SOLUTION RESPIRATORY (INHALATION) ONCE
Status: COMPLETED | OUTPATIENT
Start: 2022-07-12 | End: 2022-07-12

## 2022-07-12 RX ADMIN — IPRATROPIUM BROMIDE AND ALBUTEROL SULFATE 3 ML: .5; 3 SOLUTION RESPIRATORY (INHALATION) at 00:42

## 2022-07-12 RX ADMIN — METHYLPREDNISOLONE SODIUM SUCCINATE 125 MG: 125 INJECTION, POWDER, FOR SOLUTION INTRAMUSCULAR; INTRAVENOUS at 01:45

## 2022-07-12 RX ADMIN — SODIUM CHLORIDE 1 G: 9 INJECTION, SOLUTION INTRAVENOUS at 01:45

## 2022-07-13 DIAGNOSIS — J45.909 ASTHMA DUE TO ENVIRONMENTAL ALLERGIES: Chronic | ICD-10-CM

## 2022-07-13 DIAGNOSIS — J45.909 ASTHMA DUE TO ENVIRONMENTAL ALLERGIES: ICD-10-CM

## 2022-07-13 DIAGNOSIS — J20.9 ACUTE BRONCHITIS, UNSPECIFIED ORGANISM: ICD-10-CM

## 2022-07-13 LAB
QT INTERVAL: 348 MS
QTC INTERVAL: 383 MS

## 2022-07-13 RX ORDER — ALBUTEROL SULFATE 90 UG/1
2 AEROSOL, METERED RESPIRATORY (INHALATION) 4 TIMES DAILY PRN
Qty: 18 G | Refills: 11 | Status: SHIPPED | OUTPATIENT
Start: 2022-07-13

## 2022-07-13 RX ORDER — ALBUTEROL SULFATE 2.5 MG/3ML
2.5 SOLUTION RESPIRATORY (INHALATION) EVERY 4 HOURS PRN
Qty: 30 EACH | Refills: 5 | Status: SHIPPED | OUTPATIENT
Start: 2022-07-13

## 2022-07-13 RX ORDER — BECLOMETHASONE DIPROPIONATE HFA 80 UG/1
2 AEROSOL, METERED RESPIRATORY (INHALATION) 2 TIMES DAILY
Qty: 10.6 G | Refills: 11 | OUTPATIENT
Start: 2022-07-13

## 2022-07-13 NOTE — TELEPHONE ENCOUNTER
Caller: Jose Pace    Relationship: Self    Best call back number: [phone]    Requested Prescriptions:   Requested Prescriptions     Pending Prescriptions Disp Refills   • albuterol (PROVENTIL) (2.5 MG/3ML) 0.083% nebulizer solution 30 each 0     Sig: Take 2.5 mg by nebulization Every 4 (Four) Hours As Needed for Wheezing.   • albuterol sulfate  (90 Base) MCG/ACT inhaler 18 g 11     Sig: Inhale 2 puffs 4 (Four) Times a Day As Needed for Wheezing.   • Qvar RediHaler 80 MCG/ACT inhaler 10.6 g 11     Sig: Inhale 2 puffs 2 (Two) Times a Day.        Pharmacy where request should be sent: [id number] - [facility] [place] - [address] AT [facility] [address] - [phone] [facility] [phone] FX     Additional details provided by patient:       completely out.     Does the patient have less than a 3 day supply:  [x] Yes  [] No    Ana Blanton Rep   07/13/22 12:34 CDT

## 2022-07-18 NOTE — ED PROVIDER NOTES
Subjective   Patient presents with cough and congestion associated with shortness of breath.          Review of Systems   Constitutional: Negative for chills and fever.   HENT: Positive for congestion. Negative for ear pain and sinus pressure.    Eyes: Negative for photophobia, pain and visual disturbance.   Respiratory: Positive for cough and shortness of breath. Negative for chest tightness and wheezing.    Cardiovascular: Negative for chest pain and palpitations.   Gastrointestinal: Negative for abdominal pain, diarrhea and nausea.   Endocrine: Negative for cold intolerance and heat intolerance.   Genitourinary: Negative for difficulty urinating and urgency.   Musculoskeletal: Negative for arthralgias, joint swelling and myalgias.   Skin: Negative for color change and wound.   Neurological: Negative for dizziness and headaches.   Hematological: Negative for adenopathy. Does not bruise/bleed easily.   Psychiatric/Behavioral: Negative for agitation, behavioral problems, confusion and decreased concentration.       Past Medical History:   Diagnosis Date   • Asthma    • COPD (chronic obstructive pulmonary disease) ([facility])    • COVID-19 virus infection 2021       No Known Allergies    Past Surgical History:   Procedure Laterality Date   • HERNIA REPAIR         Family History   Problem Relation Age of Onset   • Heart murmur Mother    • Stroke Mother    • Hypertension Mother        Social History     Socioeconomic History   • Marital status: Single   Tobacco Use   • Smoking status: Former Smoker     Packs/day: 0.25     Years: 5.00     Pack years: 1.25     Types: Cigarettes     Quit date:      Years since quittin.5   • Smokeless tobacco: Never Used   Vaping Use   • Vaping Use: Never used   Substance and Sexual Activity   • Alcohol use: Yes   • Drug use: Yes     Types: Marijuana     Comment: Occ   • Sexual activity: Not Currently           Objective   Physical Exam  Vitals and nursing note reviewed.    Constitutional:       Appearance: Normal appearance. He is well-developed.   HENT:      Head: Normocephalic and atraumatic.   Eyes:      Extraocular Movements: Extraocular movements intact.      Conjunctiva/sclera: Conjunctivae normal.      Pupils: Pupils are equal, round, and reactive to light.   Cardiovascular:      Rate and Rhythm: Normal rate and regular rhythm.      Heart sounds: Normal heart sounds.   Pulmonary:      Effort: Pulmonary effort is normal.      Breath sounds: Normal breath sounds.   Abdominal:      General: Bowel sounds are normal.      Palpations: Abdomen is soft.      Tenderness: There is no abdominal tenderness.   Musculoskeletal:         General: Normal range of motion.      Cervical back: Normal range of motion and neck supple.   Skin:     General: Skin is warm and dry.      Findings: No rash.   Neurological:      General: No focal deficit present.      Mental Status: He is alert and oriented to person, place, and time.      Cranial Nerves: No cranial nerve deficit.      Deep Tendon Reflexes: Reflexes are normal and symmetric.   Psychiatric:         Behavior: Behavior normal.         Thought Content: Thought content normal.         Procedures           ED Course                                           MDM    Final diagnoses:   Upper respiratory infection, acute       ED Disposition  ED Disposition     ED Disposition   Discharge    Condition   Stable    Comment   --             Reji Hardy,   [address] [facility] [address]  [place] [address]  [phone]    In 3 days           Medication List      New Prescriptions    cefdinir 300 MG capsule  Commonly known as: OMNICEF  Take 1 capsule by mouth 2 (Two) Times a Day.     predniSONE 20 MG tablet  Commonly known as: DELTASONE  Take 1 tablet by mouth 2 (Two) Times a Day.           Where to Get Your Medications      These medications were sent to [facility] #[id number] - [place], [place] - [address] AT [facility] [address] - [phone]  - [phone] FX  [address], [facility] [address]    Phone: [phone]   · cefdinir 300 MG capsule  · predniSONE 20 MG tablet          Eusebio Mathis MD  07/18/22 [id number]

## 2022-08-11 DIAGNOSIS — J45.909 ASTHMA DUE TO ENVIRONMENTAL ALLERGIES: ICD-10-CM

## 2022-08-11 RX ORDER — FLUTICASONE PROPIONATE 50 MCG
2 SPRAY, SUSPENSION (ML) NASAL DAILY
Qty: 16 G | Refills: 3 | Status: SHIPPED | OUTPATIENT
Start: 2022-08-11 | End: 2023-01-26 | Stop reason: SDUPTHER

## 2022-08-22 ENCOUNTER — OFFICE VISIT (OUTPATIENT)
Dept: INTERNAL MEDICINE | Facility: CLINIC | Age: 47
End: 2022-08-22

## 2022-08-22 ENCOUNTER — LAB (OUTPATIENT)
Dept: LAB | Facility: HOSPITAL | Age: 47
End: 2022-08-22

## 2022-08-22 VITALS
HEART RATE: 66 BPM | DIASTOLIC BLOOD PRESSURE: 78 MMHG | HEIGHT: 65 IN | WEIGHT: 144 LBS | SYSTOLIC BLOOD PRESSURE: 118 MMHG | BODY MASS INDEX: 23.99 KG/M2 | OXYGEN SATURATION: 98 %

## 2022-08-22 DIAGNOSIS — M54.6 CHRONIC BILATERAL THORACIC BACK PAIN: ICD-10-CM

## 2022-08-22 DIAGNOSIS — Z00.00 ENCOUNTER FOR ANNUAL PHYSICAL EXAM: Primary | ICD-10-CM

## 2022-08-22 DIAGNOSIS — Z13.6 HYPERTENSION SCREEN: ICD-10-CM

## 2022-08-22 DIAGNOSIS — G89.29 CHRONIC BILATERAL THORACIC BACK PAIN: ICD-10-CM

## 2022-08-22 DIAGNOSIS — J45.909 ASTHMA DUE TO ENVIRONMENTAL ALLERGIES: ICD-10-CM

## 2022-08-22 DIAGNOSIS — N52.9 ERECTILE DYSFUNCTION, UNSPECIFIED ERECTILE DYSFUNCTION TYPE: ICD-10-CM

## 2022-08-22 DIAGNOSIS — Z83.71 FAMILY HISTORY OF COLONIC POLYPS: ICD-10-CM

## 2022-08-22 DIAGNOSIS — Z00.00 ENCOUNTER FOR ANNUAL PHYSICAL EXAM: ICD-10-CM

## 2022-08-22 LAB
BASOPHILS # BLD AUTO: 0.09 10*3/MM3 (ref 0–0.2)
BASOPHILS NFR BLD AUTO: 0.9 % (ref 0–1.5)
CHOLEST SERPL-MCNC: 143 MG/DL (ref 0–200)
DEPRECATED RDW RBC AUTO: 39.4 FL (ref 37–54)
EOSINOPHIL # BLD AUTO: 0.95 10*3/MM3 (ref 0–0.4)
EOSINOPHIL NFR BLD AUTO: 9.1 % (ref 0.3–6.2)
ERYTHROCYTE [DISTWIDTH] IN BLOOD BY AUTOMATED COUNT: 11.9 % (ref 12.3–15.4)
HBA1C MFR BLD: 4.6 % (ref 4.8–5.6)
HCT VFR BLD AUTO: 42.6 % (ref 37.5–51)
HDLC SERPL-MCNC: 57 MG/DL (ref 40–60)
HGB BLD-MCNC: 13.7 G/DL (ref 13–17.7)
IMM GRANULOCYTES # BLD AUTO: 0.04 10*3/MM3 (ref 0–0.05)
IMM GRANULOCYTES NFR BLD AUTO: 0.4 % (ref 0–0.5)
LDLC SERPL CALC-MCNC: 69 MG/DL (ref 0–100)
LDLC/HDLC SERPL: 1.19 {RATIO}
LYMPHOCYTES # BLD AUTO: 2.85 10*3/MM3 (ref 0.7–3.1)
LYMPHOCYTES NFR BLD AUTO: 27.4 % (ref 19.6–45.3)
MCH RBC QN AUTO: 29 PG (ref 26.6–33)
MCHC RBC AUTO-ENTMCNC: 32.2 G/DL (ref 31.5–35.7)
MCV RBC AUTO: 90.1 FL (ref 79–97)
MONOCYTES # BLD AUTO: 0.97 10*3/MM3 (ref 0.1–0.9)
MONOCYTES NFR BLD AUTO: 9.3 % (ref 5–12)
NEUTROPHILS NFR BLD AUTO: 5.51 10*3/MM3 (ref 1.7–7)
NEUTROPHILS NFR BLD AUTO: 52.9 % (ref 42.7–76)
NRBC BLD AUTO-RTO: 0 /100 WBC (ref 0–0.2)
PLATELET # BLD AUTO: 214 10*3/MM3 (ref 140–450)
PMV BLD AUTO: 10.9 FL (ref 6–12)
RBC # BLD AUTO: 4.73 10*6/MM3 (ref 4.14–5.8)
TRIGL SERPL-MCNC: 92 MG/DL (ref 0–150)
VLDLC SERPL-MCNC: 17 MG/DL (ref 5–40)
WBC NRBC COR # BLD: 10.41 10*3/MM3 (ref 3.4–10.8)

## 2022-08-22 PROCEDURE — 36415 COLL VENOUS BLD VENIPUNCTURE: CPT

## 2022-08-22 PROCEDURE — 85025 COMPLETE CBC W/AUTO DIFF WBC: CPT

## 2022-08-22 PROCEDURE — 83036 HEMOGLOBIN GLYCOSYLATED A1C: CPT

## 2022-08-22 PROCEDURE — 80061 LIPID PANEL: CPT

## 2022-08-22 PROCEDURE — 99396 PREV VISIT EST AGE 40-64: CPT | Performed by: NURSE PRACTITIONER

## 2022-08-22 RX ORDER — SILDENAFIL 50 MG/1
50 TABLET, FILM COATED ORAL DAILY PRN
Qty: 5 TABLET | Refills: 0 | Status: SHIPPED | OUTPATIENT
Start: 2022-08-22

## 2022-08-22 NOTE — PROGRESS NOTES
Chief Complaint   Patient presents with   • Annual Exam         History:  Jose Pace is a 47 y.o. male who presents today for evaluation of the above problems.      Annual exam.    Had Covid earlier in the year. Does not want vaccine.  Defers pneumovax, though discussed due to asthma.    No dental issues.  Wears glasses. Has regular eye exams.    Mother had colon polyps. He is not having any colon issues right now.    Continues to have some thoracic back pain at times, intermittent sharp pains.  He does a lot of heavy physical work in the [employer] and [employer].    Some issue with erectile function since several months ago when he was having intercourse and felt like he was injured.  Defers seeing a urologist, but would try Viagra. No bleeding or discharge, is still able to have an erection, just doesn't feel things are the same.     Doing well with anxiety right now, no depressive symptoms.      Social History     Socioeconomic History   • Marital status: Single   Tobacco Use   • Smoking status: Former Smoker     Packs/day: 0.25     Years: 5.00     Pack years: 1.25     Types: Cigarettes     Quit date:      Years since quittin.6   • Smokeless tobacco: Never Used   Vaping Use   • Vaping Use: Never used   Substance and Sexual Activity   • Alcohol use: Yes   • Drug use: Yes     Types: Marijuana     Comment: Occ   • Sexual activity: Not Currently       PHQ-9 Depression Screening  Little interest or pleasure in doing things? 1-->several days   Feeling down, depressed, or hopeless? 0-->not at all   Trouble falling or staying asleep, or sleeping too much?     Feeling tired or having little energy?     Poor appetite or overeating?     Feeling bad about yourself - or that you are a failure or have let yourself or your family down?     Trouble concentrating on things, such as reading the newspaper or watching television?     Moving or speaking so slowly that other people could have noticed? Or the opposite -  being so fidgety or restless that you have been moving around a lot more than usual?     Thoughts that you would be better off dead, or of hurting yourself in some way?     PHQ-9 Total Score 1   If you checked off any problems, how difficult have these problems made it for you to do your work, take care of things at home, or get along with other people?         PHQ-9 Total Score: 1    ROS:  Review of Systems   Constitutional: Negative for activity change, fever and unexpected weight change.   Eyes: Positive for visual disturbance (bifocals).   Respiratory: Negative for cough and shortness of breath.    Cardiovascular: Negative for chest pain.   Gastrointestinal: Negative for blood in stool, constipation, diarrhea, nausea and vomiting.   Genitourinary: Negative for difficulty urinating, dysuria and penile pain.   Musculoskeletal: Positive for back pain.   Allergic/Immunologic: Negative for food allergies and immunocompromised state.   Psychiatric/Behavioral: Negative for dysphoric mood and suicidal ideas. The patient is not nervous/anxious.          Current Outpatient Medications:   •  albuterol (PROVENTIL) (2.5 MG/3ML) 0.083% nebulizer solution, Take 2.5 mg by nebulization Every 4 (Four) Hours As Needed for Wheezing., Disp: 30 each, Rfl: 5  •  albuterol sulfate  (90 Base) MCG/ACT inhaler, Inhale 2 puffs 4 (Four) Times a Day As Needed for Wheezing., Disp: 18 g, Rfl: 11  •  azelastine (ASTELIN) 0.1 % nasal spray, 2 sprays into the nostril(s) as directed by provider 2 (Two) Times a Day. Use in each nostril as directed, Disp: 30 mL, Rfl: 3  •  fluticasone (Flonase) 50 MCG/ACT nasal spray, 2 sprays into the nostril(s) as directed by provider Daily. 2 puffs each nostril, Disp: 16 g, Rfl: 3  •  loratadine (Claritin) 10 MG tablet, Take 1 tablet by mouth Daily., Disp: 30 tablet, Rfl: 11  •  Qvar RediHaler 80 MCG/ACT inhaler, Inhale 2 puffs 2 (Two) Times a Day., Disp: 10.6 g, Rfl: 11  •  Spiriva HandiHaler 18 MCG per  inhalation capsule, Place 1 capsule into inhaler and inhale Daily., Disp: , Rfl:   •  cyclobenzaprine (FLEXERIL) 10 MG tablet, Take 1 tablet by mouth 3 (Three) Times a Day As Needed for Muscle Spasms., Disp: 30 tablet, Rfl: 0  •  ibuprofen (ADVIL,MOTRIN) 600 MG tablet, Take 1 tablet by mouth Every 6 (Six) Hours As Needed for Mild Pain ., Disp: 30 tablet, Rfl: 0  •  sildenafil (Viagra) 50 MG tablet, Take 1 tablet by mouth Daily As Needed for Erectile Dysfunction., Disp: 5 tablet, Rfl: 0    Lab Results   Component Value Date    GLUCOSE 113 (H) 07/12/2022    BUN 12 07/12/2022    CREATININE 1.16 07/12/2022    EGFRIFAFRI 84 07/21/2021    BCR 10.3 07/12/2022    K 5.0 07/12/2022    CO2 25.0 07/12/2022    CALCIUM 9.7 07/12/2022    ALBUMIN 5.00 07/12/2022    AST 32 07/12/2022    ALT 36 07/12/2022       WBC   Date Value Ref Range Status   08/22/2022 10.41 3.40 - 10.80 10*3/mm3 Final     RBC   Date Value Ref Range Status   08/22/2022 4.73 4.14 - 5.80 10*6/mm3 Final     Hemoglobin   Date Value Ref Range Status   08/22/2022 13.7 13.0 - 17.7 g/dL Final     Hematocrit   Date Value Ref Range Status   08/22/2022 42.6 37.5 - 51.0 % Final     MCV   Date Value Ref Range Status   08/22/2022 90.1 79.0 - 97.0 fL Final     MCH   Date Value Ref Range Status   08/22/2022 29.0 26.6 - 33.0 pg Final     MCHC   Date Value Ref Range Status   08/22/2022 32.2 31.5 - 35.7 g/dL Final     RDW   Date Value Ref Range Status   08/22/2022 11.9 (L) 12.3 - 15.4 % Final     RDW-SD   Date Value Ref Range Status   08/22/2022 39.4 37.0 - 54.0 fl Final     MPV   Date Value Ref Range Status   08/22/2022 10.9 6.0 - 12.0 fL Final     Platelets   Date Value Ref Range Status   08/22/2022 214 140 - 450 10*3/mm3 Final     Neutrophil %   Date Value Ref Range Status   08/22/2022 52.9 42.7 - 76.0 % Final     Lymphocyte %   Date Value Ref Range Status   08/22/2022 27.4 19.6 - 45.3 % Final     Monocyte %   Date Value Ref Range Status   08/22/2022 9.3 5.0 - 12.0 % Final  "    Eosinophil %   Date Value Ref Range Status   08/22/2022 9.1 (H) 0.3 - 6.2 % Final     Basophil %   Date Value Ref Range Status   08/22/2022 0.9 0.0 - 1.5 % Final     Immature Grans %   Date Value Ref Range Status   08/22/2022 0.4 0.0 - 0.5 % Final     Neutrophils, Absolute   Date Value Ref Range Status   08/22/2022 5.51 1.70 - 7.00 10*3/mm3 Final     Lymphocytes, Absolute   Date Value Ref Range Status   08/22/2022 2.85 0.70 - 3.10 10*3/mm3 Final     Monocytes, Absolute   Date Value Ref Range Status   08/22/2022 0.97 (H) 0.10 - 0.90 10*3/mm3 Final     Eosinophils, Absolute   Date Value Ref Range Status   08/22/2022 0.95 (H) 0.00 - 0.40 10*3/mm3 Final     Basophils, Absolute   Date Value Ref Range Status   08/22/2022 0.09 0.00 - 0.20 10*3/mm3 Final     Immature Grans, Absolute   Date Value Ref Range Status   08/22/2022 0.04 0.00 - 0.05 10*3/mm3 Final     nRBC   Date Value Ref Range Status   08/22/2022 0.0 0.0 - 0.2 /100 WBC Final         OBJECTIVE:  Visit Vitals  /78 (BP Location: Right arm, Patient Position: Sitting, Cuff Size: Adult)   Pulse 66   Ht 165.1 cm (65\")   Wt 65.3 kg (144 lb)   SpO2 98%   BMI 23.96 kg/m²      Physical Exam  Vitals and nursing note reviewed.   Constitutional:       General: He is not in acute distress.     Appearance: Normal appearance. He is not ill-appearing, toxic-appearing or diaphoretic.   HENT:      Head: Normocephalic and atraumatic.      Right Ear: Tympanic membrane, ear canal and external ear normal. There is no impacted cerumen.      Left Ear: Tympanic membrane, ear canal and external ear normal. There is no impacted cerumen.      Nose: Nose normal. No congestion or rhinorrhea.      Mouth/Throat:      Mouth: Mucous membranes are moist.      Pharynx: No oropharyngeal exudate or posterior oropharyngeal erythema.   Eyes:      General: No scleral icterus.        Right eye: No discharge.         Left eye: No discharge.      Conjunctiva/sclera: Conjunctivae normal.      " Pupils: Pupils are equal, round, and reactive to light.   Neck:      Vascular: No carotid bruit.   Cardiovascular:      Rate and Rhythm: Normal rate and regular rhythm.      Heart sounds: Normal heart sounds.   Pulmonary:      Effort: Pulmonary effort is normal.      Breath sounds: Normal breath sounds.   Abdominal:      General: There is no distension.      Palpations: Abdomen is soft.      Tenderness: There is no abdominal tenderness.   Musculoskeletal:         General: Tenderness present.        Arms:       Cervical back: Neck supple. No rigidity or tenderness.      Right lower leg: No edema.      Left lower leg: No edema.      Comments: Tenderness to palpation mid-T-spine with spasm bilaterally.   Lymphadenopathy:      Cervical: No cervical adenopathy.   Skin:     General: Skin is warm and dry.   Neurological:      Mental Status: He is alert and oriented to person, place, and time.   Psychiatric:         Mood and Affect: Mood normal.         Behavior: Behavior normal.         Thought Content: Thought content normal.         Judgment: Judgment normal.         Assessment/Plan    Diagnoses and all orders for this visit:    1. Encounter for annual physical exam (Primary)  -     Lipid panel; Future  -     Hemoglobin A1c; Future  -     CBC w AUTO Differential; Future    2. Asthma due to environmental allergies    3. Family history of colonic polyps  -     Ambulatory Referral to Gastroenterology    4. Erectile dysfunction, unspecified erectile dysfunction type  -     sildenafil (Viagra) 50 MG tablet; Take 1 tablet by mouth Daily As Needed for Erectile Dysfunction.  Dispense: 5 tablet; Refill: 0    5. Hypertension screen    6. Chronic bilateral thoracic back pain    Labs as above. He had normal CMP within the year, and I do not think this needs to be repeated.       Counseling/Anticipatory Guidance Discussed: nutrition, physical activity, healthy weight, injury prevention, misuse of tobacco, alcohol and drugs, sexual  behavior and STDs, dental health, mental health and immunizations     Asthma stable, and he defers pneumovax.    REfer to GI due to age with family history colon polyps (mother.)    Defers urology evaluation.  Will try Viagra and may call for refill if needed.    Blood pressure screen negative.    He has ibuprofen and Flexeril for back pain. I reviewed plain films from earlier this year and CT from 2020.  He defers PT at this time.    BMI is within normal parameters. No other follow-up for BMI required.      Return in about 1 year (around 8/22/2023) for Annual physical with Dr. Hardy.    Patient was given instructions and counseling regarding his/her condition or for health maintenance advice. Please see specific information pulled into the AVS if appropriate.     Lachelle Ortega, ABIMBOLA  15:18 CDT  8/22/2022

## 2022-10-13 ENCOUNTER — TELEPHONE (OUTPATIENT)
Dept: INTERNAL MEDICINE | Facility: CLINIC | Age: 47
End: 2022-10-13

## 2022-10-13 NOTE — TELEPHONE ENCOUNTER
Has he been taking anything OTC in additoin to his breathing treatments? Has he taken a COVID test at home?

## 2022-10-13 NOTE — TELEPHONE ENCOUNTER
PATIENT HAS BEEN CALLED, GIVEN MESSAGE AND STATED THAT HAS BEEN TAKING ALLERGY MEDICATION, CLARITIN ANDD MUCINEX.  HE HAS ALSO BEEN USING HIS INHALER.  PATIENT HAS NOT TAKEN A COVID TEST.  HE FEELS LIKE HE DOES NOT HAVE COVID.

## 2022-10-13 NOTE — TELEPHONE ENCOUNTER
Unfortunately, COVID is not a feeling. He would need to perform at least a home test, but is probably best to have an appointment here or at Urgent Care for further evaluation where proper testing could be done.

## 2022-10-13 NOTE — TELEPHONE ENCOUNTER
Caller: Jose Pace    Relationship: Self    Best call back number:[phone]    What is the best time to reach you: ANYTIME     Who are you requesting to speak with (clinical staff, provider,  specific staff member): CLINICAL     What was the call regarding: PATIENT IS HAVING ISSUES WITH HIS ASTHMA. INHALERS AND BREATHING TREATMENTS ARE NOT HELPING LIKE NORMAL. PATIENT HAS A LOT OF CONGESTION AND WILL NOT BREAK UP    Do you require a callback: YES

## 2022-10-13 NOTE — TELEPHONE ENCOUNTER
PATIENT HAS BEEN CALLED, GIVEN MESSAGE AND UNDERSTANDING VOICED.  PATIENT IS SCHEDULED FOR APPOINTMENT ON 10/14/2022 AT 8am

## 2022-10-14 ENCOUNTER — HOSPITAL ENCOUNTER (OUTPATIENT)
Dept: GENERAL RADIOLOGY | Facility: HOSPITAL | Age: 47
Discharge: HOME | End: 2022-10-14
Admitting: NURSE PRACTITIONER

## 2022-10-14 ENCOUNTER — OFFICE VISIT (OUTPATIENT)
Dept: INTERNAL MEDICINE | Facility: CLINIC | Age: 47
End: 2022-10-14

## 2022-10-14 VITALS
SYSTOLIC BLOOD PRESSURE: 118 MMHG | RESPIRATION RATE: 17 BRPM | HEART RATE: 98 BPM | TEMPERATURE: 97.2 F | DIASTOLIC BLOOD PRESSURE: 70 MMHG | BODY MASS INDEX: 24.32 KG/M2 | WEIGHT: 146 LBS | OXYGEN SATURATION: 96 % | HEIGHT: 65 IN

## 2022-10-14 DIAGNOSIS — R21 RASH: ICD-10-CM

## 2022-10-14 DIAGNOSIS — R06.02 SHORTNESS OF BREATH: ICD-10-CM

## 2022-10-14 DIAGNOSIS — R06.02 SHORTNESS OF BREATH: Primary | ICD-10-CM

## 2022-10-14 DIAGNOSIS — J45.31 MILD PERSISTENT ASTHMA WITH EXACERBATION: ICD-10-CM

## 2022-10-14 DIAGNOSIS — J45.909 ASTHMA DUE TO ENVIRONMENTAL ALLERGIES: ICD-10-CM

## 2022-10-14 LAB — SARS-COV-2 RNA RESP QL NAA+PROBE: NOT DETECTED

## 2022-10-14 PROCEDURE — 99213 OFFICE O/P EST LOW 20 MIN: CPT | Performed by: NURSE PRACTITIONER

## 2022-10-14 PROCEDURE — U0003 INFECTIOUS AGENT DETECTION BY NUCLEIC ACID (DNA OR RNA); SEVERE ACUTE RESPIRATORY SYNDROME CORONAVIRUS 2 (SARS-COV-2) (CORONAVIRUS DISEASE [COVID-19]), AMPLIFIED PROBE TECHNIQUE, MAKING USE OF HIGH THROUGHPUT TECHNOLOGIES AS DESCRIBED BY CMS-2020-01-R: HCPCS

## 2022-10-14 PROCEDURE — C9803 HOPD COVID-19 SPEC COLLECT: HCPCS

## 2022-10-14 PROCEDURE — 71046 X-RAY EXAM CHEST 2 VIEWS: CPT

## 2022-10-14 RX ORDER — FLUTICASONE PROPIONATE 110 UG/1
1 AEROSOL, METERED RESPIRATORY (INHALATION)
Qty: 1 EACH | Refills: 3 | Status: SHIPPED | OUTPATIENT
Start: 2022-10-14 | End: 2023-03-01

## 2022-10-14 RX ORDER — PREDNISONE 20 MG/1
40 TABLET ORAL DAILY
Qty: 10 TABLET | Refills: 0 | Status: SHIPPED | OUTPATIENT
Start: 2022-10-14 | End: 2022-10-19

## 2022-10-14 NOTE — PROGRESS NOTES
CC: shortness of breath and congestion    HPI     Jose Pace is a 47 y.o. male presents for the above problem. He has been having symptoms since Tuesday. 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 He was working on a duck blind this week and said he was around more dust than usual due to dry weather. He has a history of asthma and feels short of breath. He does have some mucous in his chest and has taken Mucinex with some improvement. He continues Spiriva and using albuterol inhaler frequently. He also mentions his home was recently treated for black mold. He does not smoke cigarettes but does occasionally smoke THC.          ROS:  Review of Systems   Constitutional: Negative for chills and fever.   HENT: Positive for congestion. Negative for sinus pressure, sinus pain, sneezing and sore throat.    Respiratory: Positive for cough, shortness of breath and wheezing.    Cardiovascular: Negative for chest pain, palpitations and leg swelling.   Skin: Positive for rash.          reports that he quit smoking about 13 years ago. His smoking use included cigarettes. He has a 1.25 pack-year smoking history. He has never used smokeless tobacco. He reports current alcohol use. He reports current drug use. Drug: Marijuana.    Current Outpatient Medications:   •  albuterol (PROVENTIL) (2.5 MG/3ML) 0.083% nebulizer solution, Take 2.5 mg by nebulization Every 4 (Four) Hours As Needed for Wheezing., Disp: 30 each, Rfl: 5  •  albuterol sulfate  (90 Base) MCG/ACT inhaler, Inhale 2 puffs 4 (Four) Times a Day As Needed for Wheezing., Disp: 18 g, Rfl: 11  •  azelastine (ASTELIN) 0.1 % nasal spray, 2 sprays into the nostril(s) as directed by provider 2 (Two) Times a Day. Use in each nostril as directed, Disp: 30 mL, Rfl: 3  •  cyclobenzaprine (FLEXERIL) 10 MG tablet, Take 1 tablet by mouth 3 (Three) Times a Day As Needed for Muscle Spasms., Disp: 30 tablet, Rfl: 0  •  fluticasone (Flonase) 50 MCG/ACT nasal spray, 2 sprays into the  "nostril(s) as directed by provider Daily. 2 puffs each nostril, Disp: 16 g, Rfl: 3  •  ibuprofen (ADVIL,MOTRIN) 600 MG tablet, Take 1 tablet by mouth Every 6 (Six) Hours As Needed for Mild Pain ., Disp: 30 tablet, Rfl: 0  •  loratadine (Claritin) 10 MG tablet, Take 1 tablet by mouth Daily., Disp: 30 tablet, Rfl: 11  •  sildenafil (Viagra) 50 MG tablet, Take 1 tablet by mouth Daily As Needed for Erectile Dysfunction., Disp: 5 tablet, Rfl: 0  •  Spiriva HandiHaler 18 MCG per inhalation capsule, Place 1 capsule into inhaler and inhale Daily., Disp: , Rfl:   •  fluticasone (Flovent HFA) 110 MCG/ACT inhaler, Inhale 1 puff 2 (Two) Times a Day for 30 days., Disp: 1 each, Rfl: 3  •  predniSONE (DELTASONE) 20 MG tablet, Take 2 tablets by mouth Daily for 5 days., Disp: 10 tablet, Rfl: 0  •  triamcinolone (KENALOG) 0.1 % ointment, Apply 1 application topically to the appropriate area as directed 2 (Two) Times a Day for 10 days., Disp: 80 g, Rfl: 1    OBJECTIVE:  /70 (BP Location: Left arm, Patient Position: Sitting, Cuff Size: Adult)   Pulse 98   Temp 97.2 °F (36.2 °C) (Oral)   Resp 17   Ht 165.1 cm (65\")   Wt 66.2 kg (146 lb)   SpO2 96%   BMI 24.30 kg/m²    Physical Exam  Constitutional:       Appearance: He is ill-appearing.   HENT:      Right Ear: Tympanic membrane normal.      Left Ear: Tympanic membrane normal.      Nose: Nose normal.      Mouth/Throat:      Mouth: Mucous membranes are moist.   Cardiovascular:      Rate and Rhythm: Normal rate and regular rhythm.      Heart sounds: Normal heart sounds.   Pulmonary:      Effort: Respiratory distress present.      Breath sounds: Wheezing and rhonchi present.      Comments: Short of breath with talking  Skin:     Findings: Rash (abdomen and chest) present.         ASSESSMENT/PLAN:     Diagnoses and all orders for this visit:    1. Shortness of breath (Primary)  2. Asthma due to environmental allergies  3. Mild persistent asthma with exacerbation  -     XR Chest " 2 View; Future  -     COVID-19,CEPHEID/STAR,COR/PERLA/PAD/ANNE IN-HOUSE(OR EMERGENT/ADD-ON),NP SWAB IN TRANSPORT MEDIA 3-4 HR TAT, RT-PCR - Swab, Nasopharynx; Future  -     fluticasone (Flovent HFA) 110 MCG/ACT inhaler; Inhale 1 puff 2 (Two) Times a Day for 30 days.  Dispense: 1 each; Refill: 3  -     predniSONE (DELTASONE) 20 MG tablet; Take 2 tablets by mouth Daily for 5 days.  Dispense: 10 tablet; Refill: 0  He has not used Qvar inhaler in some time and does not have this at home due to issues with insurance coverage. Will prescribe Flovent to help with symptoms.     4. Rash  -     triamcinolone (KENALOG) 0.1 % ointment; Apply 1 application topically to the appropriate area as directed 2 (Two) Times a Day for 10 days.  Dispense: 80 g; Refill: 1  Rash to abdomen for 2 weeks. He says he changed soaps recently. He has taken benadryl and using hydrocortisone cream with some improvement.      Advised to present to ED if shortness of breath worsens and to call with if symptoms do not improve.     BMI is within normal parameters. No other follow-up for BMI required.       An After Visit Summary was printed and given to the patient at discharge.  Return if symptoms worsen or fail to improve.          ABIMBOLA Song 10/14/2022   Electronically signed.

## 2022-10-18 ENCOUNTER — TRANSCRIBE ORDERS (OUTPATIENT)
Dept: ADMINISTRATIVE | Facility: HOSPITAL | Age: 47
End: 2022-10-18

## 2022-10-18 DIAGNOSIS — J45.50 SEVERE PERSISTENT ASTHMA, UNCOMPLICATED: Primary | ICD-10-CM

## 2022-11-02 ENCOUNTER — HOSPITAL ENCOUNTER (OUTPATIENT)
Dept: PULMONOLOGY | Facility: HOSPITAL | Age: 47
Discharge: HOME | End: 2022-11-02
Admitting: ALLERGY & IMMUNOLOGY

## 2022-11-02 DIAGNOSIS — J45.50 SEVERE PERSISTENT ASTHMA, UNCOMPLICATED: ICD-10-CM

## 2022-11-02 PROCEDURE — 94060 EVALUATION OF WHEEZING: CPT

## 2022-11-02 PROCEDURE — 94726 PLETHYSMOGRAPHY LUNG VOLUMES: CPT | Performed by: INTERNAL MEDICINE

## 2022-11-02 PROCEDURE — 94726 PLETHYSMOGRAPHY LUNG VOLUMES: CPT

## 2022-11-02 PROCEDURE — 94729 DIFFUSING CAPACITY: CPT | Performed by: INTERNAL MEDICINE

## 2022-11-02 PROCEDURE — 94060 EVALUATION OF WHEEZING: CPT | Performed by: INTERNAL MEDICINE

## 2022-11-02 PROCEDURE — 94729 DIFFUSING CAPACITY: CPT

## 2022-11-02 RX ORDER — ALBUTEROL SULFATE 2.5 MG/3ML
2.5 SOLUTION RESPIRATORY (INHALATION) ONCE
Status: COMPLETED | OUTPATIENT
Start: 2022-11-02 | End: 2022-11-02

## 2022-11-02 RX ADMIN — ALBUTEROL SULFATE 2.5 MG: 2.5 SOLUTION RESPIRATORY (INHALATION) at 15:28

## 2023-01-26 DIAGNOSIS — J45.909 ASTHMA DUE TO ENVIRONMENTAL ALLERGIES: ICD-10-CM

## 2023-01-26 RX ORDER — FLUTICASONE PROPIONATE 50 MCG
2 SPRAY, SUSPENSION (ML) NASAL DAILY
Qty: 16 G | Refills: 3 | Status: SHIPPED | OUTPATIENT
Start: 2023-01-26

## 2023-03-01 ENCOUNTER — OFFICE VISIT (OUTPATIENT)
Dept: PULMONOLOGY | Facility: CLINIC | Age: 48
End: 2023-03-01
Payer: COMMERCIAL

## 2023-03-01 VITALS
DIASTOLIC BLOOD PRESSURE: 70 MMHG | HEART RATE: 60 BPM | SYSTOLIC BLOOD PRESSURE: 116 MMHG | HEIGHT: 64 IN | WEIGHT: 143 LBS | BODY MASS INDEX: 24.41 KG/M2 | OXYGEN SATURATION: 99 %

## 2023-03-01 DIAGNOSIS — J45.50 SEVERE PERSISTENT ASTHMA, UNCOMPLICATED: Primary | Chronic | ICD-10-CM
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
## 2023-03-01 DIAGNOSIS — F12.20 MODERATE TETRAHYDROCANNABINOL (THC) DEPENDENCE: Chronic | ICD-10-CM

## 2023-03-01 PROCEDURE — 99213 OFFICE O/P EST LOW 20 MIN: CPT | Performed by: NURSE PRACTITIONER

## 2023-03-01 PROCEDURE — 94010 BREATHING CAPACITY TEST: CPT | Performed by: NURSE PRACTITIONER

## 2023-03-01 RX ORDER — BUDESONIDE AND FORMOTEROL FUMARATE DIHYDRATE 160; 4.5 UG/1; UG/1
2 AEROSOL RESPIRATORY (INHALATION) 2 TIMES DAILY
COMMUNITY
Start: 2023-02-19

## 2023-03-01 NOTE — PROGRESS NOTES
"Chief Complaint  Asthma    Subjective    History of Present Illness      Jose Pace presents to [facility] for:    History of Present Illness   Management of asthma.  He smokes THC daily.  He has shortness of breath with exertional activities and is not able to exercise as vigorously as he would like.  He reports that he becomes winded walking uphill or running.  He had updated PFTs today that shows stability.  He had a normal chest x-ray in October.  He is now on Symbicort instead of Qvar.  He has been getting allergy injections at Dr. Tello's office.  He has a rescue inhaler that he uses as needed.  He is working as a  and is very concerned about lung scarring.  He is requesting a CT scan.  A high-res CT for lung scarring evaluation has been ordered.  He does not take vaccines.  Objective   Vital Signs:   /70   Pulse 60   Ht 161.3 cm (63.5\")   Wt 64.9 kg (143 lb)   SpO2 99% Comment: RA  BMI 24.93 kg/m²     Physical Exam  Vitals reviewed.   Constitutional:       General: He is not in acute distress.     Appearance: Normal appearance.   HENT:      Head: Normocephalic and atraumatic.      Comments: Wearing a mask  Cardiovascular:      Rate and Rhythm: Normal rate and regular rhythm.   Pulmonary:      Effort: Pulmonary effort is normal.      Breath sounds: Normal breath sounds.   Musculoskeletal:      Cervical back: Normal range of motion.   Skin:     General: Skin is warm and dry.   Neurological:      Mental Status: He is alert and oriented to person, place, and time.   Psychiatric:         Mood and Affect: Mood normal.         Behavior: Behavior normal.        Result Review :   The following data was reviewed by: ABIMBOLA Jackson on 03/01/2023:  XR Chest 2 View (10/14/2022 09:18)    Results for orders placed in visit on 03/01/23    Pulmonary Function Test    Narrative  Pulmonary Function Test  Performed by: Kellen Lehman " R, RRT  Authorized by: Mike Forbes APRN    Pre Drug % Predicted  FVC: 92%  FEV1: 86%  FEF 25-75%: 69%  FEV1/FVC: 76%    Interpretation  Spirometry  Spirometry shows mild obstruction. There is reduced midflow suggesting small airway/airflow obstruction.      Results for orders placed during the hospital encounter of 11/02/22    Full Pulmonary Function Test With Bronchodilator & ABG    Narrative  Psychiatric - Pulmonary Function Test    [facility]  [id number]  [phone]    Patient : Jose Pace  MRN : [id number]  CSN : [id number]  Pulmonologist : Stephane Hester MD  Date : 11/2/2022    ______________________________________________________________________    Interpretation :  1.  Spirometry is consistent with a moderate obstructive ventilatory defect although the decrease in the patient's FEV1 is just into the moderate range at 76% of predicted.  2.  There is significant improvement in spirometry postbronchodilator.  Postbronchodilator spirometry just reveals a mild decrease in peak expiratory flow and otherwise is within normal limits.  3.  Lung volumes reveal an isolated decrease in expiratory reserve volume, and otherwise are within normal limits.  4.  There is a mild diffusion impairment which when corrected for alveolar volume is normalized.  5.  When current studies are compared to [facility], the patient's current prebronchodilator spirometry reveals improvement in his FVC and a slight drop in his FEV1 compared to previous baseline spirometry.  His current postbronchodilator spirometry reveals improvement in both his FVC and FEV1 compared to previous baseline spirometry.      Stephane Hester MD      Results for orders placed in visit on 02/27/20    Pulmonary Function Test    Narrative  Pulmonary Function Test  Performed by: Mike Forbes APRN  Authorized by: Mike Forbes  APRN    Pre Drug  FVC: 97%  FEV1: 89%  FEF 25-75%: 59%  FEV1/FVC: 76.17%               Assessment and Plan      Diagnoses and all orders for this visit:    1. Severe persistent asthma, uncomplicated (Primary)  Comments:  Currently stable.  Continue Symbicort twice daily and albuterol as needed.  Flow-volume loop on return.  Orders:  -     Pulmonary Function Test    2. Moderate tetrahydrocannabinol (THC) dependence (HCC)  Comments:  Daily use.  Annual chest scans.    3. 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  Obtain high-res CT for evaluation.  Orders:  -     CT Chest Hi Resolution Diagnostic; Future        Mike Forbes, APRN  3/1/2023  17:33 CST    Follow Up   Return in about 1 year (around 3/1/2024) for HR CT scan and FVL.    Patient was given instructions and counseling regarding his condition or for health maintenance advice. Please see specific information pulled into the AVS if appropriate.

## 2023-03-01 NOTE — PROCEDURES
Pulmonary Function Test  Performed by: Kellen Lehman, RRT  Authorized by: Mike Forbes APRN      Pre Drug % Predicted    FVC: 92%   FEV1: 86%   FEF 25-75%: 69%   FEV1/FVC: 76%    Interpretation   Spirometry   Spirometry shows mild obstruction. There is reduced midflow suggesting small airway/airflow obstruction.
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
## 2023-03-13 ENCOUNTER — HOSPITAL ENCOUNTER (OUTPATIENT)
Dept: GENERAL RADIOLOGY | Facility: HOSPITAL | Age: 48
Discharge: HOME | End: 2023-03-13
Admitting: NURSE PRACTITIONER
Payer: COMMERCIAL
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
## 2023-03-13 PROCEDURE — 71046 X-RAY EXAM CHEST 2 VIEWS: CPT

## 2023-08-25 ENCOUNTER — OFFICE VISIT (OUTPATIENT)
Dept: INTERNAL MEDICINE | Facility: CLINIC | Age: 48
End: 2023-08-25
Payer: COMMERCIAL

## 2023-08-25 VITALS
WEIGHT: 145.5 LBS | HEART RATE: 77 BPM | RESPIRATION RATE: 16 BRPM | HEIGHT: 64 IN | SYSTOLIC BLOOD PRESSURE: 127 MMHG | DIASTOLIC BLOOD PRESSURE: 83 MMHG | BODY MASS INDEX: 24.84 KG/M2 | OXYGEN SATURATION: 98 %

## 2023-08-25 DIAGNOSIS — E66.3 OVERWEIGHT (BMI 25.0-29.9): ICD-10-CM

## 2023-08-25 DIAGNOSIS — Z00.01 ANNUAL VISIT FOR GENERAL ADULT MEDICAL EXAMINATION WITH ABNORMAL FINDINGS: Primary | ICD-10-CM

## 2023-08-25 DIAGNOSIS — Z12.12 SCREENING FOR COLORECTAL CANCER: ICD-10-CM

## 2023-08-25 DIAGNOSIS — Z12.11 SCREENING FOR COLORECTAL CANCER: ICD-10-CM

## 2023-08-25 DIAGNOSIS — J45.909 ASTHMA DUE TO ENVIRONMENTAL ALLERGIES: ICD-10-CM

## 2023-08-25 RX ORDER — ALBUTEROL SULFATE 90 UG/1
2 AEROSOL, METERED RESPIRATORY (INHALATION) 4 TIMES DAILY PRN
Qty: 18 G | Refills: 11 | Status: SHIPPED | OUTPATIENT
Start: 2023-08-25

## 2023-08-27 NOTE — PROGRESS NOTES
CC: f/u preventive health    History:  Jose Pace is a 48 y.o. male who presents today for evaluation of the above problems.  He notes he has been doing well and has not had any uncontrolled illness. He continues to use inhaler for asthma, but notes no recent flares.     ROS:  Review of Systems   Constitutional:  Negative for chills and fever.   HENT:  Negative for congestion and sore throat.    Eyes:  Negative for visual disturbance.   Respiratory:  Negative for cough and shortness of breath.    Cardiovascular:  Negative for chest pain and palpitations.   Gastrointestinal:  Negative for abdominal pain, constipation and nausea.   Endocrine: Negative for cold intolerance and heat intolerance.   Genitourinary:  Negative for difficulty urinating, dysuria and frequency.   Musculoskeletal:  Negative for arthralgias and back pain.   Skin:  Negative for rash.   Neurological:  Negative for dizziness and headaches.   Psychiatric/Behavioral:  Negative for dysphoric mood. The patient is not nervous/anxious.      No Known Allergies  Past Medical History:   Diagnosis Date    Asthma     COPD (chronic obstructive pulmonary disease)     COVID-19 virus infection 12/30/2021     Past Surgical History:   Procedure Laterality Date    HERNIA REPAIR       Family History   Problem Relation Age of Onset    Heart murmur Mother     Stroke Mother     Hypertension Mother       reports that he quit smoking about 14 years ago. His smoking use included cigarettes. He has a 1.25 pack-year smoking history. He has been exposed to tobacco smoke. He has never used smokeless tobacco. He reports current alcohol use. He reports current drug use. Drug: Marijuana.      Current Outpatient Medications:     albuterol (PROVENTIL) (2.5 MG/3ML) 0.083% nebulizer solution, Take 2.5 mg by nebulization Every 4 (Four) Hours As Needed for Wheezing., Disp: 30 each, Rfl: 5    albuterol sulfate  (90 Base) MCG/ACT inhaler, Inhale 2 puffs 4 (Four) Times a Day As  "Needed for Wheezing., Disp: 18 g, Rfl: 11    NON FORMULARY, Inject 1 mL under the skin into the appropriate area as directed Every 7 (Seven) Days. Isela Liu, Disp: , Rfl:     Symbicort 160-4.5 MCG/ACT inhaler, Inhale 2 puffs 2 (Two) Times a Day., Disp: , Rfl:     fluticasone (Flovent HFA) 110 MCG/ACT inhaler, Inhale 1 puff 2 (Two) Times a Day for 30 days., Disp: 1 each, Rfl: 3    OBJECTIVE:  /83 (BP Location: Left arm, Patient Position: Sitting, Cuff Size: Adult)   Pulse 77   Resp 16   Ht 161.3 cm (63.5\")   Wt 66 kg (145 lb 8 oz)   SpO2 98%   BMI 25.37 kg/mý    Physical Exam  Constitutional:       General: He is not in acute distress.     Appearance: Normal appearance.   HENT:      Head: Normocephalic and atraumatic.      Right Ear: External ear normal.      Left Ear: External ear normal.   Eyes:      General: No scleral icterus.     Extraocular Movements: Extraocular movements intact.   Cardiovascular:      Rate and Rhythm: Normal rate and regular rhythm.      Heart sounds: Normal heart sounds. No murmur heard.  Pulmonary:      Effort: Pulmonary effort is normal. No respiratory distress.      Breath sounds: Normal breath sounds. No wheezing.   Abdominal:      General: There is no distension.      Palpations: Abdomen is soft.      Tenderness: There is no abdominal tenderness.   Musculoskeletal:         General: Normal range of motion.      Cervical back: Normal range of motion and neck supple.      Right lower leg: No edema.      Left lower leg: No edema.   Skin:     General: Skin is warm and dry.   Neurological:      Mental Status: He is alert and oriented to person, place, and time.      Gait: Gait normal.   Psychiatric:         Mood and Affect: Mood normal.         Behavior: Behavior normal.       Assessment/Plan    Diagnoses and all orders for this visit:    1. Annual visit for general adult medical examination with abnormal findings (Primary)  -     Comprehensive Metabolic Panel; Future  -     " Hemoglobin A1c; Future  -     Lipid Panel; Future  -     CBC (No Diff); Future  Immunizations:      - Tetanus: Received in 2020      - Influenza: Recommend yearly.      - Prevnar: Due, but refused.      - Shingrix: Series after 50      - COVID: Declines primary vaccination.   Colonoscopy: Colonoscopy referral placed  Prostate: Discuss at 57 or on symptoms.  Well controlled and BP goal for age is <140/90 per JNC 8 guidelines    2. Asthma due to environmental allergies  -     albuterol sulfate  (90 Base) MCG/ACT inhaler; Inhale 2 puffs 4 (Four) Times a Day As Needed for Wheezing.  Dispense: 18 g; Refill: 11  Well controlled without exacerbation.     3. Overweight (BMI 25.0-29.9)  BMI is >= 25 and <30. (Overweight) The following options were offered after discussion;: exercise counseling/recommendations and nutrition counseling/recommendations    4. Screening for colorectal cancer  -     Ambulatory Referral For Screening Colonoscopy        An After Visit Summary was printed and given to the patient at discharge.  Return in about 1 year (around 8/25/2024) for Annual physical.         Reji Hardy D.O. 8/26/2023   Electronically signed.

## 2023-09-27 DIAGNOSIS — J45.909 ASTHMA DUE TO ENVIRONMENTAL ALLERGIES: Chronic | ICD-10-CM

## 2023-09-28 DIAGNOSIS — R06.02 SHORTNESS OF BREATH: ICD-10-CM

## 2023-09-28 RX ORDER — FLUTICASONE PROPIONATE 110 UG/1
1 AEROSOL, METERED RESPIRATORY (INHALATION)
Qty: 1 EACH | Refills: 3 | Status: CANCELLED | OUTPATIENT
Start: 2023-09-28 | End: 2023-10-28

## 2023-09-28 RX ORDER — BECLOMETHASONE DIPROPIONATE HFA 80 UG/1
AEROSOL, METERED RESPIRATORY (INHALATION)
Qty: 10.6 G | Refills: 11 | OUTPATIENT
Start: 2023-09-28

## 2023-09-28 NOTE — TELEPHONE ENCOUNTER
Rx Refill Note  Requested Prescriptions     Pending Prescriptions Disp Refills    Qvar RediHaler 80 MCG/ACT inhaler [Pharmacy Med Name: QVAR REDIHALER 80MCG ORALINH (120)] 10.6 g 11     Sig: INHALE 2 PUFFS BY MOUTH TWICE DAILY      Last office visit with prescribing clinician: 8/25/2023   Last telemedicine visit with prescribing clinician: Visit date not found   Next office visit with prescribing clinician: 8/26/2024                         Would you like a call back once the refill request has been completed: [] Yes [] No    If the office needs to give you a call back, can they leave a voicemail: [] Yes [] No    Eric Falcon MA  09/28/23, 08:05 CDT

## 2023-09-28 NOTE — TELEPHONE ENCOUNTER
He is taking Symbicort, no? If so, no need for additional Flovent as they have the same type of medication in both.

## 2023-10-10 ENCOUNTER — LAB (OUTPATIENT)
Dept: LAB | Facility: HOSPITAL | Age: 48
End: 2023-10-10
Payer: COMMERCIAL

## 2023-10-10 ENCOUNTER — OFFICE VISIT (OUTPATIENT)
Dept: GASTROENTEROLOGY | Facility: CLINIC | Age: 48
End: 2023-10-10
Payer: COMMERCIAL

## 2023-10-10 VITALS
BODY MASS INDEX: 26.49 KG/M2 | SYSTOLIC BLOOD PRESSURE: 100 MMHG | TEMPERATURE: 98.7 F | HEIGHT: 65 IN | WEIGHT: 159 LBS | OXYGEN SATURATION: 98 % | DIASTOLIC BLOOD PRESSURE: 60 MMHG | HEART RATE: 82 BPM

## 2023-10-10 DIAGNOSIS — Z83.719 FAMILY HX COLONIC POLYPS: ICD-10-CM

## 2023-10-10 DIAGNOSIS — Z00.01 ANNUAL VISIT FOR GENERAL ADULT MEDICAL EXAMINATION WITH ABNORMAL FINDINGS: ICD-10-CM

## 2023-10-10 DIAGNOSIS — Z12.11 ENCOUNTER FOR SCREENING FOR MALIGNANT NEOPLASM OF COLON: Primary | ICD-10-CM

## 2023-10-10 LAB
ALBUMIN SERPL-MCNC: 4 G/DL (ref 3.5–5.2)
ALBUMIN/GLOB SERPL: 1.5 G/DL
ALP SERPL-CCNC: 58 U/L (ref 39–117)
ALT SERPL W P-5'-P-CCNC: 39 U/L (ref 1–41)
ANION GAP SERPL CALCULATED.3IONS-SCNC: 9 MMOL/L (ref 5–15)
AST SERPL-CCNC: 45 U/L (ref 1–40)
BILIRUB SERPL-MCNC: 0.6 MG/DL (ref 0–1.2)
BUN SERPL-MCNC: 16 MG/DL (ref 6–20)
BUN/CREAT SERPL: 11.7 (ref 7–25)
CALCIUM SPEC-SCNC: 8.8 MG/DL (ref 8.6–10.5)
CHLORIDE SERPL-SCNC: 101 MMOL/L (ref 98–107)
CHOLEST SERPL-MCNC: 120 MG/DL (ref 0–200)
CO2 SERPL-SCNC: 28 MMOL/L (ref 22–29)
CREAT SERPL-MCNC: 1.37 MG/DL (ref 0.76–1.27)
DEPRECATED RDW RBC AUTO: 44.2 FL (ref 37–54)
EGFRCR SERPLBLD CKD-EPI 2021: 64.4 ML/MIN/1.73
ERYTHROCYTE [DISTWIDTH] IN BLOOD BY AUTOMATED COUNT: 12.7 % (ref 12.3–15.4)
GLOBULIN UR ELPH-MCNC: 2.7 GM/DL
GLUCOSE SERPL-MCNC: 85 MG/DL (ref 65–99)
HBA1C MFR BLD: 4.3 % (ref 4.8–5.6)
HCT VFR BLD AUTO: 48.1 % (ref 37.5–51)
HDLC SERPL-MCNC: 40 MG/DL (ref 40–60)
HGB BLD-MCNC: 14.6 G/DL (ref 13–17.7)
LDLC SERPL CALC-MCNC: 69 MG/DL (ref 0–100)
LDLC/HDLC SERPL: 1.76 {RATIO}
MCH RBC QN AUTO: 28.4 PG (ref 26.6–33)
MCHC RBC AUTO-ENTMCNC: 30.4 G/DL (ref 31.5–35.7)
MCV RBC AUTO: 93.6 FL (ref 79–97)
PLATELET # BLD AUTO: 282 10*3/MM3 (ref 140–450)
PMV BLD AUTO: 10.6 FL (ref 6–12)
POTASSIUM SERPL-SCNC: 4.4 MMOL/L (ref 3.5–5.2)
PROT SERPL-MCNC: 6.7 G/DL (ref 6–8.5)
RBC # BLD AUTO: 5.14 10*6/MM3 (ref 4.14–5.8)
SODIUM SERPL-SCNC: 138 MMOL/L (ref 136–145)
TRIGL SERPL-MCNC: 48 MG/DL (ref 0–150)
VLDLC SERPL-MCNC: 11 MG/DL (ref 5–40)
WBC NRBC COR # BLD: 12.26 10*3/MM3 (ref 3.4–10.8)

## 2023-10-10 PROCEDURE — 80053 COMPREHEN METABOLIC PANEL: CPT

## 2023-10-10 PROCEDURE — 83036 HEMOGLOBIN GLYCOSYLATED A1C: CPT

## 2023-10-10 PROCEDURE — 85027 COMPLETE CBC AUTOMATED: CPT

## 2023-10-10 PROCEDURE — 80061 LIPID PANEL: CPT

## 2023-10-10 PROCEDURE — 36415 COLL VENOUS BLD VENIPUNCTURE: CPT

## 2023-10-10 NOTE — H&P (VIEW-ONLY)
[facility]    Primary Physician Reji Hardy,     10/10/2023    Jose Pace   1975      Chief Complaint   Patient presents with    Colonoscopy       Subjective     HPI    Joes Pace is a 48 y.o. male who presents as a referral for preventative maintenance. He has no complaints of nausea or vomiting. No change in bowels. No wt loss. No BRBPR. No melena. No abdominal pain.       No history of colonoscopy.     Mother had colon polyps, before age 60.     Past Medical History:   Diagnosis Date    Asthma     COPD (chronic obstructive pulmonary disease)     COVID-19 virus infection 2021       Past Surgical History:   Procedure Laterality Date    HERNIA REPAIR         Outpatient Medications Marked as Taking for the 10/10/23 encounter (Office Visit) with Vandana Santiago APRN   Medication Sig Dispense Refill    albuterol (PROVENTIL) (2.5 MG/3ML) 0.083% nebulizer solution Take 2.5 mg by nebulization Every 4 (Four) Hours As Needed for Wheezing. 30 each 5    albuterol sulfate  (90 Base) MCG/ACT inhaler Inhale 2 puffs 4 (Four) Times a Day As Needed for Wheezing. 18 g 11    Symbicort 160-4.5 MCG/ACT inhaler Inhale 2 puffs 2 (Two) Times a Day.         No Known Allergies    Social History     Socioeconomic History    Marital status: Single   Tobacco Use    Smoking status: Every Day     Packs/day: 0.25     Years: 5.00     Additional pack years: 0.00     Total pack years: 1.25     Types: Cigarettes     Last attempt to quit:      Years since quittin.7     Passive exposure: Past    Smokeless tobacco: Never   Vaping Use    Vaping Use: Never used   Substance and Sexual Activity    Alcohol use: Yes     Comment: occ    Drug use: Yes     Types: Marijuana     Comment: Occ    Sexual activity: Not Currently       Family History   Problem Relation Age of Onset    Colon polyps Mother     Heart murmur Mother     Stroke Mother     Hypertension Mother     Colon cancer Neg Hx         Review of Systems   Constitutional:  Negative for chills, fever and unexpected weight change.   Respiratory:  Negative for shortness of breath.    Cardiovascular:  Negative for chest pain.   Gastrointestinal:  Negative for abdominal distention, abdominal pain, anal bleeding, blood in stool, constipation, diarrhea, nausea and vomiting.       Objective     Vitals:    10/10/23 1357   BP: 100/60   Pulse: 82   Temp: 98.7 °F (37.1 °C)   SpO2: 98%         10/10/23  1357   Weight: 72.1 kg (159 lb)     Body mass index is 26.46 kg/m².    Physical Exam  Vitals reviewed.   Constitutional:       General: He is not in acute distress.  Cardiovascular:      Rate and Rhythm: Normal rate and regular rhythm.      Heart sounds: Normal heart sounds.   Pulmonary:      Effort: Pulmonary effort is normal.      Breath sounds: Normal breath sounds.   Abdominal:      General: Bowel sounds are normal. There is no distension.      Palpations: Abdomen is soft.      Tenderness: There is no abdominal tenderness.   Skin:     General: Skin is warm and dry.   Neurological:      Mental Status: He is alert.         Imaging Results (Most Recent)       None            Assessment & Plan     Diagnoses and all orders for this visit:    1. Encounter for screening for malignant neoplasm of colon (Primary)  -     Case Request; Standing  -     Case Request    2. Family hx colonic polyps    Other orders  -     Implement Anesthesia Orders Day of Procedure; Standing  -     Obtain Informed Consent; Standing          Schedule colonoscopy. Use miralax prep.                COLONOSCOPY WITH ANESTHESIA (N/A)  All risks, benefits, alternatives, and indications of colonoscopy procedure have been discussed with the patient. Risks to include perforation of the colon requiring possible surgery or colostomy, risk of bleeding from biopsies or removal of colon tissue, possibility of missing a colon polyp or cancer, or adverse drug reaction.  Benefits to include the  diagnosis and management of disease of the colon and rectum. Alternatives to include barium enema, radiographic evaluation, lab testing or no intervention. Pt verbalizes understanding and agrees.     There are no Patient Instructions on file for this visit.    Vandana Santiago, APRN

## 2023-10-10 NOTE — PROGRESS NOTES
[facility]    Primary Physician Reji Hardy,     10/10/2023    Jose Pace   1975      Chief Complaint   Patient presents with    Colonoscopy       Subjective     HPI    Jose Pace is a 48 y.o. male who presents as a referral for preventative maintenance. He has no complaints of nausea or vomiting. No change in bowels. No wt loss. No BRBPR. No melena. No abdominal pain.       No history of colonoscopy.     Mother had colon polyps, before age 60.     Past Medical History:   Diagnosis Date    Asthma     COPD (chronic obstructive pulmonary disease)     COVID-19 virus infection 2021       Past Surgical History:   Procedure Laterality Date    HERNIA REPAIR         Outpatient Medications Marked as Taking for the 10/10/23 encounter (Office Visit) with Vandana Santiago APRN   Medication Sig Dispense Refill    albuterol (PROVENTIL) (2.5 MG/3ML) 0.083% nebulizer solution Take 2.5 mg by nebulization Every 4 (Four) Hours As Needed for Wheezing. 30 each 5    albuterol sulfate  (90 Base) MCG/ACT inhaler Inhale 2 puffs 4 (Four) Times a Day As Needed for Wheezing. 18 g 11    Symbicort 160-4.5 MCG/ACT inhaler Inhale 2 puffs 2 (Two) Times a Day.         No Known Allergies    Social History     Socioeconomic History    Marital status: Single   Tobacco Use    Smoking status: Every Day     Packs/day: 0.25     Years: 5.00     Additional pack years: 0.00     Total pack years: 1.25     Types: Cigarettes     Last attempt to quit:      Years since quittin.7     Passive exposure: Past    Smokeless tobacco: Never   Vaping Use    Vaping Use: Never used   Substance and Sexual Activity    Alcohol use: Yes     Comment: occ    Drug use: Yes     Types: Marijuana     Comment: Occ    Sexual activity: Not Currently       Family History   Problem Relation Age of Onset    Colon polyps Mother     Heart murmur Mother     Stroke Mother     Hypertension Mother     Colon cancer Neg Hx         Review of Systems   Constitutional:  Negative for chills, fever and unexpected weight change.   Respiratory:  Negative for shortness of breath.    Cardiovascular:  Negative for chest pain.   Gastrointestinal:  Negative for abdominal distention, abdominal pain, anal bleeding, blood in stool, constipation, diarrhea, nausea and vomiting.       Objective     Vitals:    10/10/23 1357   BP: 100/60   Pulse: 82   Temp: 98.7 øF (37.1 øC)   SpO2: 98%         10/10/23  1357   Weight: 72.1 kg (159 lb)     Body mass index is 26.46 kg/mý.    Physical Exam  Vitals reviewed.   Constitutional:       General: He is not in acute distress.  Cardiovascular:      Rate and Rhythm: Normal rate and regular rhythm.      Heart sounds: Normal heart sounds.   Pulmonary:      Effort: Pulmonary effort is normal.      Breath sounds: Normal breath sounds.   Abdominal:      General: Bowel sounds are normal. There is no distension.      Palpations: Abdomen is soft.      Tenderness: There is no abdominal tenderness.   Skin:     General: Skin is warm and dry.   Neurological:      Mental Status: He is alert.         Imaging Results (Most Recent)       None            Assessment & Plan     Diagnoses and all orders for this visit:    1. Encounter for screening for malignant neoplasm of colon (Primary)  -     Case Request; Standing  -     Case Request    2. Family hx colonic polyps    Other orders  -     Implement Anesthesia Orders Day of Procedure; Standing  -     Obtain Informed Consent; Standing          Schedule colonoscopy. Use miralax prep.                COLONOSCOPY WITH ANESTHESIA (N/A)  All risks, benefits, alternatives, and indications of colonoscopy procedure have been discussed with the patient. Risks to include perforation of the colon requiring possible surgery or colostomy, risk of bleeding from biopsies or removal of colon tissue, possibility of missing a colon polyp or cancer, or adverse drug reaction.  Benefits to include the  diagnosis and management of disease of the colon and rectum. Alternatives to include barium enema, radiographic evaluation, lab testing or no intervention. Pt verbalizes understanding and agrees.     There are no Patient Instructions on file for this visit.    Vandana Santiago, APRN

## 2023-10-24 ENCOUNTER — ANESTHESIA EVENT (OUTPATIENT)
Dept: GASTROENTEROLOGY | Facility: HOSPITAL | Age: 48
End: 2023-10-24
Payer: COMMERCIAL

## 2023-10-24 ENCOUNTER — HOSPITAL ENCOUNTER (OUTPATIENT)
Facility: HOSPITAL | Age: 48
Setting detail: HOSPITAL OUTPATIENT SURGERY
Discharge: HOME | End: 2023-10-24
Attending: INTERNAL MEDICINE | Admitting: INTERNAL MEDICINE
Payer: COMMERCIAL

## 2023-10-24 ENCOUNTER — ANESTHESIA (OUTPATIENT)
Dept: GASTROENTEROLOGY | Facility: HOSPITAL | Age: 48
End: 2023-10-24
Payer: COMMERCIAL

## 2023-10-24 ENCOUNTER — TELEPHONE (OUTPATIENT)
Dept: GASTROENTEROLOGY | Facility: CLINIC | Age: 48
End: 2023-10-24
Payer: COMMERCIAL

## 2023-10-24 VITALS
WEIGHT: 148 LBS | OXYGEN SATURATION: 95 % | HEART RATE: 76 BPM | BODY MASS INDEX: 25.27 KG/M2 | RESPIRATION RATE: 23 BRPM | SYSTOLIC BLOOD PRESSURE: 107 MMHG | TEMPERATURE: 98.6 F | HEIGHT: 64 IN | DIASTOLIC BLOOD PRESSURE: 68 MMHG

## 2023-10-24 PROCEDURE — 25810000003 SODIUM CHLORIDE 0.9 % SOLUTION: Performed by: ANESTHESIOLOGY

## 2023-10-24 PROCEDURE — 25010000002 PROPOFOL 10 MG/ML EMULSION

## 2023-10-24 PROCEDURE — 45378 DIAGNOSTIC COLONOSCOPY: CPT | Performed by: INTERNAL MEDICINE

## 2023-10-24 RX ORDER — PROPOFOL 10 MG/ML
VIAL (ML) INTRAVENOUS AS NEEDED
Status: DISCONTINUED | OUTPATIENT
Start: 2023-10-24 | End: 2023-10-24 | Stop reason: SURG

## 2023-10-24 RX ORDER — ONDANSETRON 2 MG/ML
4 INJECTION INTRAMUSCULAR; INTRAVENOUS ONCE AS NEEDED
Status: DISCONTINUED | OUTPATIENT
Start: 2023-10-24 | End: 2023-10-24 | Stop reason: HOSPADM

## 2023-10-24 RX ORDER — LIDOCAINE HYDROCHLORIDE 20 MG/ML
INJECTION, SOLUTION EPIDURAL; INFILTRATION; INTRACAUDAL; PERINEURAL AS NEEDED
Status: DISCONTINUED | OUTPATIENT
Start: 2023-10-24 | End: 2023-10-24 | Stop reason: SURG

## 2023-10-24 RX ORDER — SODIUM CHLORIDE 9 MG/ML
500 INJECTION, SOLUTION INTRAVENOUS CONTINUOUS PRN
Status: DISCONTINUED | OUTPATIENT
Start: 2023-10-24 | End: 2023-10-24 | Stop reason: HOSPADM

## 2023-10-24 RX ORDER — SODIUM CHLORIDE 0.9 % (FLUSH) 0.9 %
10 SYRINGE (ML) INJECTION AS NEEDED
Status: DISCONTINUED | OUTPATIENT
Start: 2023-10-24 | End: 2023-10-24 | Stop reason: HOSPADM

## 2023-10-24 RX ORDER — LIDOCAINE HYDROCHLORIDE 10 MG/ML
0.5 INJECTION, SOLUTION EPIDURAL; INFILTRATION; INTRACAUDAL; PERINEURAL ONCE AS NEEDED
Status: CANCELLED | OUTPATIENT
Start: 2023-10-24

## 2023-10-24 RX ADMIN — PROPOFOL INJECTABLE EMULSION 300 MG: 10 INJECTION, EMULSION INTRAVENOUS at 14:21

## 2023-10-24 RX ADMIN — LIDOCAINE HYDROCHLORIDE 100 MG: 20 INJECTION, SOLUTION EPIDURAL; INFILTRATION; INTRACAUDAL; PERINEURAL at 14:21

## 2023-10-24 RX ADMIN — SODIUM CHLORIDE 500 ML: 9 INJECTION, SOLUTION INTRAVENOUS at 13:39

## 2023-10-24 NOTE — ANESTHESIA POSTPROCEDURE EVALUATION
Patient: Jose Pace    Procedure Summary       Date: 10/24/23 Room / Location:  [facility] /  [facility]    Anesthesia Start: 1416 Anesthesia Stop: 1437    Procedure: COLONOSCOPY WITH ANESTHESIA Diagnosis:       Encounter for screening for malignant neoplasm of colon      (Encounter for screening for malignant neoplasm of colon [Z12.11])    Surgeons: Sanket Wong MD Provider: Meek Aquino CRNA    Anesthesia Type: MAC ASA Status: 2            Anesthesia Type: MAC    Vitals  Vitals Value Taken Time   BP     Temp     Pulse 80 10/24/23 1437   Resp     SpO2 95 % 10/24/23 1437   Vitals shown include unfiled device data.        Post Anesthesia Care and Evaluation    Patient location during evaluation: PHASE II  Patient participation: complete - patient participated  Level of consciousness: awake and alert  Pain score: 0    Airway patency: patent  Anesthetic complications: No anesthetic complications  PONV Status: none  Cardiovascular status: acceptable  Respiratory status: acceptable  Hydration status: acceptable  No anesthesia care post op

## 2023-10-24 NOTE — ANESTHESIA PREPROCEDURE EVALUATION
Anesthesia Evaluation     Patient summary reviewed   no history of anesthetic complications:   NPO Solid Status: > 8 hours  NPO Liquid Status: > 2 hours           Airway   Mallampati: II  TM distance: >3 FB  Neck ROM: full  No difficulty expected  Dental - normal exam     Pulmonary    (+) a smoker Current, asthma,  (-) sleep apnea  Cardiovascular   Exercise tolerance: excellent (>7 METS)    (-) hypertension, hyperlipidemia      Neuro/Psych  (-) seizures, TIA, CVA  GI/Hepatic/Renal/Endo    (-) liver disease, no renal disease, diabetes    Musculoskeletal     Abdominal    Substance History   (+) drug use (THC)     OB/GYN          Other                      Anesthesia Plan    ASA 2     MAC       Anesthetic plan, risks, benefits, and alternatives have been provided, discussed and informed consent has been obtained with: patient.    CODE STATUS:

## 2023-12-29 ENCOUNTER — APPOINTMENT (OUTPATIENT)
Dept: GENERAL RADIOLOGY | Facility: HOSPITAL | Age: 48
End: 2023-12-29
Payer: COMMERCIAL

## 2023-12-29 PROCEDURE — 87636 SARSCOV2 & INF A&B AMP PRB: CPT | Performed by: PHYSICIAN ASSISTANT

## 2023-12-29 PROCEDURE — 71046 X-RAY EXAM CHEST 2 VIEWS: CPT

## 2023-12-29 PROCEDURE — 87081 CULTURE SCREEN ONLY: CPT | Performed by: PHYSICIAN ASSISTANT

## 2024-03-06 ENCOUNTER — OFFICE VISIT (OUTPATIENT)
Dept: PULMONOLOGY | Facility: CLINIC | Age: 49
End: 2024-03-06
Payer: COMMERCIAL

## 2024-03-06 VITALS
DIASTOLIC BLOOD PRESSURE: 80 MMHG | OXYGEN SATURATION: 97 % | BODY MASS INDEX: 24.93 KG/M2 | HEART RATE: 76 BPM | SYSTOLIC BLOOD PRESSURE: 108 MMHG | WEIGHT: 149.6 LBS | HEIGHT: 65 IN
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
## 2024-03-06 DIAGNOSIS — F12.20 MODERATE TETRAHYDROCANNABINOL (THC) DEPENDENCE: Chronic | ICD-10-CM

## 2024-03-06 DIAGNOSIS — J45.50 SEVERE PERSISTENT ASTHMA, UNCOMPLICATED: Primary | Chronic | ICD-10-CM

## 2024-03-06 RX ORDER — ALBUTEROL SULFATE AND BUDESONIDE 90; 80 UG/1; UG/1
2 AEROSOL, METERED RESPIRATORY (INHALATION)
Qty: 10.7 G | Refills: 6 | Status: SHIPPED | OUTPATIENT
Start: 2024-03-06

## 2024-03-06 RX ORDER — BUDESONIDE, GLYCOPYRROLATE, AND FORMOTEROL FUMARATE 160; 9; 4.8 UG/1; UG/1; UG/1
2 AEROSOL, METERED RESPIRATORY (INHALATION) 2 TIMES DAILY
Qty: 1 EACH | Refills: 6 | Status: SHIPPED | OUTPATIENT
Start: 2024-03-06

## 2024-03-06 NOTE — PROGRESS NOTES
"Chief Complaint  Asthma    Subjective    History of Present Illness      Jose Pace presents to [facility] for:    History of Present Illness   Annual appointment for management of asthma.  He is still seeing Dr. Tello and getting allergy shots.  He has had a significant drop in lung function over the past year.  He is still working as a .  He had a normal chest x-ray at the end of December.  He has had a lot of personal issues going on over the last few months and he thinks this has contributed to a decrease in his overall health. He continues to smoke marijuana daily.  He has been out of his Symbicort for a couple of days.  He is agreeable to trial Breztri and Airsupra.  Proper demonstration of those devices were shown to him and he was provided a sample of each and prescriptions sent to pharmacy.  He does not take vaccines.  Objective   Vital Signs:   /80   Pulse 76   Ht 165.1 cm (65\")   Wt 67.9 kg (149 lb 9.6 oz)   SpO2 97% Comment: RA  BMI 24.89 kg/m²     Physical Exam  Vitals reviewed.   Constitutional:       General: He is not in acute distress.     Appearance: Normal appearance.   HENT:      Head: Normocephalic and atraumatic.   Cardiovascular:      Rate and Rhythm: Normal rate and regular rhythm.   Pulmonary:      Effort: Pulmonary effort is normal.      Breath sounds: Normal breath sounds.   Musculoskeletal:      Cervical back: Normal range of motion.   Skin:     General: Skin is warm and dry.   Neurological:      Mental Status: He is alert and oriented to person, place, and time.   Psychiatric:         Mood and Affect: Mood normal.         Behavior: Behavior normal.        Result Review :   The following data was reviewed by: ABIMBOLA Jackson on 03/06/2024:  XR Chest 2 View (12/29/2023 11:32)   Results for orders placed in visit on 03/06/24    Spirometry    Narrative  Spirometry    Performed by: Kellen Lehman, " RRT  Authorized by: Mike Forbes APRN  Pre Drug % Predicted  FVC: 85%  FEV1: 66%  FEF 25-75%: 50%  FEV1/FVC: 63%    Interpretation  Spirometry  Spirometry shows moderate obstruction. There is reduced midflow suggesting small airway/airflow obstruction.      Results for orders placed in visit on 03/01/23    Pulmonary Function Test    Narrative  Pulmonary Function Test  Performed by: Kellen Lehman, RRT  Authorized by: Mike Forbes APRN    Pre Drug % Predicted  FVC: 92%  FEV1: 86%  FEF 25-75%: 69%  FEV1/FVC: 76%    Interpretation  Spirometry  Spirometry shows mild obstruction. There is reduced midflow suggesting small airway/airflow obstruction.      Results for orders placed during the hospital encounter of 11/02/22    Full Pulmonary Function Test With Bronchodilator & ABG    Narrative  [facility] - Pulmonary Function Test    [address] [place] [address]  [id number]  [phone]    Patient : Jose Pace  MRN : [id number]  CSN : [id number]  Pulmonologist : Stephane Hester MD  Date : 11/2/2022    ______________________________________________________________________    Interpretation :  1.  Spirometry is consistent with a moderate obstructive ventilatory defect although the decrease in the patient's FEV1 is just into the moderate range at 76% of predicted.  2.  There is significant improvement in spirometry postbronchodilator.  Postbronchodilator spirometry just reveals a mild decrease in peak expiratory flow and otherwise is within normal limits.  3.  Lung volumes reveal an isolated decrease in expiratory reserve volume, and otherwise are within normal limits.  4.  There is a mild diffusion impairment which when corrected for alveolar volume is normalized.  5.  When current studies are compared to [facility], the patient's current prebronchodilator spirometry reveals improvement in his FVC and a slight drop  in his FEV1 compared to previous baseline spirometry.  His current postbronchodilator spirometry reveals improvement in both his FVC and FEV1 compared to previous baseline spirometry.      Stephane Hester MD               Assessment and Plan      Diagnoses and all orders for this visit:    1. Severe persistent asthma, uncomplicated (Primary)  Comments:  With worsening FEV1.  Transition from Symbicort to Breztri.  Add Airsupra.  Recheck lung function in 3 months.  Orders:  -     Spirometry  -     Budeson-Glycopyrrol-Formoterol (Breztri Aerosphere) 160-9-4.8 MCG/ACT aerosol inhaler; Inhale 2 puffs 2 (Two) Times a Day.  Dispense: 1 each; Refill: 6  -     Albuterol-Budesonide (Airsupra) 90-80 MCG/ACT aerosol; Inhale 2 puffs 6 (Six) Times a Day.  Dispense: 10.7 g; Refill: 6    2. Moderate tetrahydrocannabinol (THC) dependence  Comments:  Smoking cessation recommended.    3. 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        Mike Forbes, APRN  3/6/2024  16:54 CST    Follow Up   Return in about 3 months (around 6/6/2024) for FVL.    Patient was given instructions and counseling regarding his condition or for health maintenance advice. Please see specific information pulled into the AVS if appropriate.

## 2024-03-06 NOTE — PROCEDURES
Spirometry    Performed by: Kellen Lehman, RRT  Authorized by: Mike Forbes APRN     Pre Drug % Predicted    FVC: 85%   FEV1: 66%   FEF 25-75%: 50%   FEV1/FVC: 63%    Interpretation   Spirometry   Spirometry shows moderate obstruction. There is reduced midflow suggesting small airway/airflow obstruction.

## 2024-06-12 ENCOUNTER — OFFICE VISIT (OUTPATIENT)
Dept: PULMONOLOGY | Facility: CLINIC | Age: 49
End: 2024-06-12
Payer: COMMERCIAL

## 2024-06-12 VITALS
DIASTOLIC BLOOD PRESSURE: 78 MMHG | SYSTOLIC BLOOD PRESSURE: 122 MMHG | OXYGEN SATURATION: 97 % | BODY MASS INDEX: 26.29 KG/M2 | WEIGHT: 154 LBS | HEART RATE: 87 BPM | HEIGHT: 64 IN

## 2024-06-12 DIAGNOSIS — F12.20 MODERATE TETRAHYDROCANNABINOL (THC) DEPENDENCE: Chronic | ICD-10-CM

## 2024-06-12 DIAGNOSIS — J45.909 ASTHMA, UNSPECIFIED ASTHMA SEVERITY, UNSPECIFIED WHETHER COMPLICATED, UNSPECIFIED WHETHER PERSISTENT: Primary | Chronic | ICD-10-CM

## 2024-06-12 PROCEDURE — 99213 OFFICE O/P EST LOW 20 MIN: CPT | Performed by: NURSE PRACTITIONER

## 2024-06-12 PROCEDURE — 1159F MED LIST DOCD IN RCRD: CPT | Performed by: NURSE PRACTITIONER

## 2024-06-12 PROCEDURE — 94375 RESPIRATORY FLOW VOLUME LOOP: CPT | Performed by: NURSE PRACTITIONER

## 2024-06-12 PROCEDURE — 1160F RVW MEDS BY RX/DR IN RCRD: CPT | Performed by: NURSE PRACTITIONER

## 2024-06-12 RX ORDER — ALBUTEROL SULFATE AND BUDESONIDE 90; 80 UG/1; UG/1
2 AEROSOL, METERED RESPIRATORY (INHALATION)
Qty: 10.7 G | Refills: 6 | Status: SHIPPED | OUTPATIENT
Start: 2024-06-12

## 2024-06-12 NOTE — PROGRESS NOTES
"Chief Complaint  Severe persistent asthma, uncomplicated    Subjective    History of Present Illness      Jose Pace presents to [facility] for:    History of Present Illness   Follow-up for management of asthma after having a tremendous drop in lung function in March.  He was changed from Symbicort to Breztri and put on Airsupra.  He returns today and his lung function has tremendously improved.  I told him he can stop taking the Breztri.  He can continue on Airsupra.  He has been doing much more physical activity over the past few months.  He does not take vaccines.  Objective   Vital Signs:   /78   Pulse 87   Ht 161.3 cm (63.5\")   Wt 69.9 kg (154 lb)   SpO2 97% Comment: RA  BMI 26.85 kg/m²     Physical Exam  Vitals reviewed.   Constitutional:       General: He is not in acute distress.     Appearance: Normal appearance.   HENT:      Head: Normocephalic and atraumatic.   Cardiovascular:      Rate and Rhythm: Normal rate and regular rhythm.   Pulmonary:      Effort: Pulmonary effort is normal.      Breath sounds: Normal breath sounds.   Musculoskeletal:      Cervical back: Normal range of motion.   Skin:     General: Skin is warm and dry.   Neurological:      Mental Status: He is alert and oriented to person, place, and time.   Psychiatric:         Mood and Affect: Mood normal.         Behavior: Behavior normal.        Result Review :       Results for orders placed in visit on 06/12/24    Spirometry    Narrative  Spirometry    Performed by: Rafa Willoughby CMA  Authorized by: Mike Forbes APRN  Pre Drug % Predicted  FVC: 105%  FEV1: 102%  FEF 25-75%: 75%  FEV1/FVC: 79.66%    Interpretation  Spirometry  Spirometry shows normal results. There is reduced midflow suggesting small airway/airflow obstruction.      Results for orders placed in visit on 03/06/24    Spirometry    Narrative  Spirometry    Performed by: Kellen Lehman, " RRT  Authorized by: Mike Forbes APRN  Pre Drug % Predicted  FVC: 85%  FEV1: 66%  FEF 25-75%: 50%  FEV1/FVC: 63%    Interpretation  Spirometry  Spirometry shows moderate obstruction. There is reduced midflow suggesting small airway/airflow obstruction.      Results for orders placed in visit on 03/01/23    Pulmonary Function Test    Narrative  Pulmonary Function Test  Performed by: Kellen Lehman, RRT  Authorized by: Mkie Forbes APRN    Pre Drug % Predicted  FVC: 92%  FEV1: 86%  FEF 25-75%: 69%  FEV1/FVC: 76%    Interpretation  Spirometry  Spirometry shows mild obstruction. There is reduced midflow suggesting small airway/airflow obstruction.             Assessment and Plan      Diagnoses and all orders for this visit:    1. Asthma, unspecified asthma severity, unspecified whether complicated, unspecified whether persistent (Primary)  Comments:  Improved FEV1 over the past 3 months.  Continue Airsupra.  Resume Symbicort as needed.  Okay to stop Breztri.  Flow-volume loop on return.  Orders:  -     Spirometry  -     Albuterol-Budesonide (Airsupra) 90-80 MCG/ACT aerosol; Inhale 2 puffs 6 (Six) Times a Day.  Dispense: 10.7 g; Refill: 6    2. Moderate tetrahydrocannabinol (THC) dependence  Comments:  Continue annual chest x-rays.        ABIMBOLA Jackson  6/12/2024  16:50 CDT    Follow Up   Return in about 1 year (around 6/12/2025) for FVL, CXR.    Patient was given instructions and counseling regarding his condition or for health maintenance advice. Please see specific information pulled into the AVS if appropriate.

## 2024-06-12 NOTE — PROCEDURES
Spirometry    Performed by: Rafa Willoughby CMA  Authorized by: Mike Forbes APRN     Pre Drug % Predicted    FVC: 105%   FEV1: 102%   FEF 25-75%: 75%   FEV1/FVC: 79.66%    Interpretation   Spirometry   Spirometry shows normal results. There is reduced midflow suggesting small airway/airflow obstruction.

## 2024-07-29 ENCOUNTER — PATIENT ROUNDING (BHMG ONLY) (OUTPATIENT)
Dept: URGENT CARE | Facility: CLINIC | Age: 49
End: 2024-07-29
Payer: COMMERCIAL

## 2024-07-31 ENCOUNTER — APPOINTMENT (OUTPATIENT)
Dept: GENERAL RADIOLOGY | Facility: HOSPITAL | Age: 49
End: 2024-07-31
Payer: COMMERCIAL

## 2024-07-31 PROCEDURE — 71101 X-RAY EXAM UNILAT RIBS/CHEST: CPT

## 2024-07-31 PROCEDURE — 73060 X-RAY EXAM OF HUMERUS: CPT

## 2024-08-02 NOTE — ED NOTES
Thank you for letting us care for you in your recent visit to our urgent care center. We would love to hear about your experience with us. Was this the first time you have visited our location?    We’re always looking for ways to make our patients’ experiences even better. Do you have any recommendations on ways we may improve?     I appreciate you taking the time to respond. Please be on the lookout for a survey about your recent visit from [employer] via text or email. We would greatly appreciate if you could fill that out and turn it back in. We want your voice to be heard and we value your feedback.   Thank you for choosing [facility] for your healthcare needs.

## 2024-08-08 ENCOUNTER — HOSPITAL ENCOUNTER (OUTPATIENT)
Dept: MRI IMAGING | Age: 49
Discharge: HOME | End: 2024-08-08
Payer: MEDICAID

## 2024-08-08 DIAGNOSIS — S29.011A RUPTURE OF PECTORALIS MAJOR MUSCLE, INITIAL ENCOUNTER: ICD-10-CM

## 2024-08-08 PROCEDURE — 71550 MRI CHEST W/O DYE: CPT

## 2024-08-26 ENCOUNTER — OFFICE VISIT (OUTPATIENT)
Dept: INTERNAL MEDICINE | Facility: CLINIC | Age: 49
End: 2024-08-26
Payer: COMMERCIAL

## 2024-08-26 VITALS
HEART RATE: 79 BPM | RESPIRATION RATE: 16 BRPM | SYSTOLIC BLOOD PRESSURE: 114 MMHG | BODY MASS INDEX: 25.58 KG/M2 | HEIGHT: 65 IN | WEIGHT: 153.5 LBS | OXYGEN SATURATION: 98 % | DIASTOLIC BLOOD PRESSURE: 75 MMHG

## 2024-08-26 DIAGNOSIS — Z01.818 PREOP EXAM FOR INTERNAL MEDICINE: ICD-10-CM

## 2024-08-26 DIAGNOSIS — E66.3 OVERWEIGHT (BMI 25.0-29.9): ICD-10-CM

## 2024-08-26 DIAGNOSIS — R39.198 DECREASED URINE STREAM: ICD-10-CM

## 2024-08-26 DIAGNOSIS — Z00.01 ANNUAL VISIT FOR GENERAL ADULT MEDICAL EXAMINATION WITH ABNORMAL FINDINGS: Primary | ICD-10-CM

## 2024-08-26 DIAGNOSIS — J45.909 ASTHMA DUE TO ENVIRONMENTAL ALLERGIES: ICD-10-CM

## 2024-08-26 DIAGNOSIS — S29.011A RUPTURE OF PECTORALIS MAJOR MUSCLE, INITIAL ENCOUNTER: ICD-10-CM

## 2024-08-26 PROBLEM — Z12.11 ENCOUNTER FOR SCREENING FOR MALIGNANT NEOPLASM OF COLON: Status: RESOLVED | Noted: 2023-10-10 | Resolved: 2024-08-26

## 2024-08-26 PROCEDURE — 99214 OFFICE O/P EST MOD 30 MIN: CPT | Performed by: INTERNAL MEDICINE

## 2024-08-26 PROCEDURE — 1159F MED LIST DOCD IN RCRD: CPT | Performed by: INTERNAL MEDICINE

## 2024-08-26 PROCEDURE — 1160F RVW MEDS BY RX/DR IN RCRD: CPT | Performed by: INTERNAL MEDICINE

## 2024-08-26 NOTE — PROGRESS NOTES
CC: f/u preventive health and left pec tear    History:  Jose Pace is a 49 y.o. male who presents today for evaluation of the above problems.        History of Present Illness  The patient presents for evaluation of multiple medical concerns.    He experienced a fall at work, landing on his back and causing pain in the left chest. An MRI was performed, and he has been under the care of an orthopedic group. He was shown to have a high-grade partial-thickness to full-thickness tear of the left pectoralis major at the musculotendinous junction of the sternal head. He has been managing the pain with stretching, massage, and ice application. Despite the injury, he is able to continue working and lifting up to a certain limit. He reports no chest pain or shortness of breath during exercise. He does not experience any heart palpitations or irregularities. He has no new swelling or shortness of breath and is able to climb 2 flights of stairs without difficulty.    He had a brief episode of rectal bleeding, which has since resolved. He does not report any rectal itching, burning, or lumps.    He has been experiencing insomnia but is not taking any medication for it.    ROS:  Review of Systems   Constitutional:  Negative for chills and fever.   HENT:  Negative for congestion and sore throat.    Eyes:  Negative for visual disturbance.   Respiratory:  Negative for cough and shortness of breath.    Cardiovascular:  Negative for chest pain and palpitations.   Gastrointestinal:  Positive for blood in stool. Negative for abdominal pain, constipation and nausea.   Endocrine: Negative for cold intolerance and heat intolerance.   Genitourinary:  Negative for difficulty urinating and frequency.   Musculoskeletal:  Positive for myalgias. Negative for arthralgias and back pain.   Skin:  Negative for rash.   Neurological:  Negative for dizziness and headaches.   Psychiatric/Behavioral:  Negative for dysphoric mood. The patient is  "not nervous/anxious.        No Known Allergies  Past Medical History:   Diagnosis Date    Asthma     COPD (chronic obstructive pulmonary disease)     COVID-19 virus infection 12/30/2021     Past Surgical History:   Procedure Laterality Date    COLONOSCOPY N/A 10/24/2023    Procedure: COLONOSCOPY WITH ANESTHESIA;  Surgeon: Sanket Wong MD;  Location: [facility] ENDOSCOPY;  Service: Gastroenterology;  Laterality: N/A;  preop: screening  post op: normal   PCP:Reji Hardy, DO    HERNIA REPAIR       Family History   Problem Relation Age of Onset    Colon polyps Mother     Heart murmur Mother     Stroke Mother     Hypertension Mother     Colon cancer Neg Hx       reports that he quit smoking about 15 years ago. His smoking use included cigarettes. He started smoking about 20 years ago. He has a 1.3 pack-year smoking history. He has been exposed to tobacco smoke. He has never used smokeless tobacco. He reports current alcohol use. He reports current drug use. Drug: Marijuana.      Current Outpatient Medications:     albuterol (PROVENTIL) (2.5 MG/3ML) 0.083% nebulizer solution, Take 2.5 mg by nebulization Every 4 (Four) Hours As Needed for Wheezing., Disp: 30 each, Rfl: 5    Albuterol-Budesonide (Airsupra) 90-80 MCG/ACT aerosol, Inhale 2 puffs 6 (Six) Times a Day., Disp: 10.7 g, Rfl: 6    ipratropium-albuterol (DUO-NEB) 0.5-2.5 mg/3 ml nebulizer, Take 3 mL by nebulization Every 4 (Four) Hours As Needed for Wheezing or Shortness of Air for up to 20 doses., Disp: 60 mL, Rfl: 0    OBJECTIVE:  /75 (BP Location: Left arm, Patient Position: Sitting, Cuff Size: Adult)   Pulse 79   Resp 16   Ht 165.1 cm (65\")   Wt 69.6 kg (153 lb 8 oz)   SpO2 98%   BMI 25.54 kg/m²    Physical Exam  Constitutional:       General: He is not in acute distress.     Appearance: Normal appearance.   HENT:      Head: Normocephalic and atraumatic.      Right Ear: External ear normal.      Left Ear: External ear normal.   Eyes:      " General: No scleral icterus.     Extraocular Movements: Extraocular movements intact.   Cardiovascular:      Rate and Rhythm: Normal rate and regular rhythm.      Heart sounds: Normal heart sounds. No murmur heard.  Pulmonary:      Effort: Pulmonary effort is normal. No respiratory distress.      Breath sounds: Normal breath sounds. No wheezing.   Abdominal:      General: There is no distension.      Palpations: Abdomen is soft.      Tenderness: There is no abdominal tenderness.   Musculoskeletal:         General: Normal range of motion.      Cervical back: Normal range of motion and neck supple.      Right lower leg: No edema.      Left lower leg: No edema.   Skin:     General: Skin is warm and dry.   Neurological:      Mental Status: He is alert and oriented to person, place, and time.      Gait: Gait normal.   Psychiatric:         Mood and Affect: Mood normal.         Behavior: Behavior normal.             Assessment/Plan    Diagnoses and all orders for this visit:    1. Annual visit for general adult medical examination with abnormal findings (Primary)  -     Comprehensive Metabolic Panel; Future  -     Hemoglobin A1c; Future  -     Lipid Panel; Future  Immunizations:      - Tetanus: Received in 2020      - Influenza: Recommend yearly.      - Prevnar: Once after age 65      - Shingrix: Series after 50      - COVID: Consider vaccination.   Colonoscopy: Colonoscopy done 1 years ago with 5 year recall.  Prostate: Discuss at 57 or on symptoms.    2. Rupture of pectoralis major muscle, initial encounter  3. Preop exam for internal medicine  -     CBC (No Diff); Future  -     Protime-INR; Future  MRI as summarized above is reviewed. Labs for regular screening in addition to pre-op evaluation. He is unsure who his surgeon is, but they are recommending intervention. RCRI risk is 0. Patient is able to perform ? 4 METS without cardiopulmonary difficulty. Previous EKG in 7/2022 showed NSR and he has no new symptoms. This  patient is considered low risk for this moderate risk procedure and may proceed without any further cardiac testing.     4. Decreased urine stream  -     Urinalysis With Culture If Indicated - Urine, Clean Catch; Future  UA for evaluation.     5. Asthma due to environmental allergies  Good control without exacerbation.     6. Overweight (BMI 25.0-29.9)  BMI is >= 25 and <30. (Overweight) The following options were offered after discussion;: exercise counseling/recommendations and nutrition counseling/recommendations          An After Visit Summary was printed and given to the patient at discharge.  Return in about 1 year (around 8/26/2025) for Annual physical.       Patient or patient representative verbalized consent for the use of Ambient Listening during the visit with  Reji Hardy DO for chart documentation. 8/26/2024  16:46 CDT    Reji Hardy D.O. 8/26/2024   Electronically signed.

## 2024-09-04 ENCOUNTER — LAB (OUTPATIENT)
Dept: LAB | Facility: HOSPITAL | Age: 49
End: 2024-09-04
Payer: COMMERCIAL

## 2024-09-04 DIAGNOSIS — R39.198 DECREASED URINE STREAM: ICD-10-CM

## 2024-09-04 DIAGNOSIS — Z01.818 PREOP EXAM FOR INTERNAL MEDICINE: ICD-10-CM

## 2024-09-04 DIAGNOSIS — Z00.01 ANNUAL VISIT FOR GENERAL ADULT MEDICAL EXAMINATION WITH ABNORMAL FINDINGS: ICD-10-CM

## 2024-09-04 LAB
ALBUMIN SERPL-MCNC: 4.5 G/DL (ref 3.5–5.2)
ALBUMIN/GLOB SERPL: 1.4 G/DL
ALP SERPL-CCNC: 68 U/L (ref 39–117)
ALT SERPL W P-5'-P-CCNC: 32 U/L (ref 1–41)
ANION GAP SERPL CALCULATED.3IONS-SCNC: 10 MMOL/L (ref 5–15)
AST SERPL-CCNC: 44 U/L (ref 1–40)
BILIRUB SERPL-MCNC: 0.8 MG/DL (ref 0–1.2)
BILIRUB UR QL STRIP: NEGATIVE
BUN SERPL-MCNC: 22 MG/DL (ref 6–20)
BUN/CREAT SERPL: 14.4 (ref 7–25)
CALCIUM SPEC-SCNC: 9.3 MG/DL (ref 8.6–10.5)
CHLORIDE SERPL-SCNC: 102 MMOL/L (ref 98–107)
CHOLEST SERPL-MCNC: 147 MG/DL (ref 0–200)
CLARITY UR: CLEAR
CO2 SERPL-SCNC: 29 MMOL/L (ref 22–29)
COLOR UR: YELLOW
CREAT SERPL-MCNC: 1.53 MG/DL (ref 0.76–1.27)
DEPRECATED RDW RBC AUTO: 38.5 FL (ref 37–54)
EGFRCR SERPLBLD CKD-EPI 2021: 56.1 ML/MIN/1.73
ERYTHROCYTE [DISTWIDTH] IN BLOOD BY AUTOMATED COUNT: 11.9 % (ref 12.3–15.4)
GLOBULIN UR ELPH-MCNC: 3.3 GM/DL
GLUCOSE SERPL-MCNC: 91 MG/DL (ref 65–99)
GLUCOSE UR STRIP-MCNC: NEGATIVE MG/DL
HBA1C MFR BLD: 4.8 % (ref 4.8–5.6)
HCT VFR BLD AUTO: 51.7 % (ref 37.5–51)
HDLC SERPL-MCNC: 53 MG/DL (ref 40–60)
HGB BLD-MCNC: 16.6 G/DL (ref 13–17.7)
HGB UR QL STRIP.AUTO: NEGATIVE
INR PPP: 1.02 (ref 0.91–1.09)
KETONES UR QL STRIP: NEGATIVE
LDLC SERPL CALC-MCNC: 82 MG/DL (ref 0–100)
LDLC/HDLC SERPL: 1.55 {RATIO}
LEUKOCYTE ESTERASE UR QL STRIP.AUTO: NEGATIVE
MCH RBC QN AUTO: 28.1 PG (ref 26.6–33)
MCHC RBC AUTO-ENTMCNC: 32.1 G/DL (ref 31.5–35.7)
MCV RBC AUTO: 87.5 FL (ref 79–97)
NITRITE UR QL STRIP: NEGATIVE
PH UR STRIP.AUTO: 7 [PH] (ref 5–8)
PLATELET # BLD AUTO: 274 10*3/MM3 (ref 140–450)
PMV BLD AUTO: 10.7 FL (ref 6–12)
POTASSIUM SERPL-SCNC: 4.7 MMOL/L (ref 3.5–5.2)
PROT SERPL-MCNC: 7.8 G/DL (ref 6–8.5)
PROT UR QL STRIP: NEGATIVE
PROTHROMBIN TIME: 13.8 SECONDS (ref 11.8–14.8)
RBC # BLD AUTO: 5.91 10*6/MM3 (ref 4.14–5.8)
SODIUM SERPL-SCNC: 141 MMOL/L (ref 136–145)
SP GR UR STRIP: 1.02 (ref 1–1.03)
TRIGL SERPL-MCNC: 58 MG/DL (ref 0–150)
UROBILINOGEN UR QL STRIP: NORMAL
VLDLC SERPL-MCNC: 12 MG/DL (ref 5–40)
WBC NRBC COR # BLD AUTO: 12.01 10*3/MM3 (ref 3.4–10.8)

## 2024-09-04 PROCEDURE — 80053 COMPREHEN METABOLIC PANEL: CPT

## 2024-09-04 PROCEDURE — 85610 PROTHROMBIN TIME: CPT

## 2024-09-04 PROCEDURE — 80061 LIPID PANEL: CPT

## 2024-09-04 PROCEDURE — 85027 COMPLETE CBC AUTOMATED: CPT

## 2024-09-04 PROCEDURE — 81003 URINALYSIS AUTO W/O SCOPE: CPT

## 2024-09-04 PROCEDURE — 36415 COLL VENOUS BLD VENIPUNCTURE: CPT

## 2024-09-04 PROCEDURE — 83036 HEMOGLOBIN GLYCOSYLATED A1C: CPT

## 2025-02-05 ENCOUNTER — HOSPITAL ENCOUNTER (EMERGENCY)
Facility: HOSPITAL | Age: 50
Discharge: HOME | End: 2025-02-05
Attending: FAMILY MEDICINE
Payer: OTHER MISCELLANEOUS

## 2025-02-05 ENCOUNTER — APPOINTMENT (OUTPATIENT)
Dept: MRI IMAGING | Facility: HOSPITAL | Age: 50
End: 2025-02-05
Payer: OTHER MISCELLANEOUS

## 2025-02-05 ENCOUNTER — APPOINTMENT (OUTPATIENT)
Dept: GENERAL RADIOLOGY | Facility: HOSPITAL | Age: 50
End: 2025-02-05
Payer: OTHER MISCELLANEOUS

## 2025-02-05 VITALS
DIASTOLIC BLOOD PRESSURE: 63 MMHG | WEIGHT: 150 LBS | SYSTOLIC BLOOD PRESSURE: 118 MMHG | HEART RATE: 63 BPM | BODY MASS INDEX: 24.99 KG/M2 | HEIGHT: 65 IN | OXYGEN SATURATION: 98 % | RESPIRATION RATE: 16 BRPM | TEMPERATURE: 98.5 F

## 2025-02-05 DIAGNOSIS — S86.111A GASTROCNEMIUS TEAR, RIGHT, INITIAL ENCOUNTER: ICD-10-CM

## 2025-02-05 DIAGNOSIS — S96.811A RUPTURE OF PLANTARIS TENDON, RIGHT, INITIAL ENCOUNTER: Primary | ICD-10-CM

## 2025-02-05 PROCEDURE — 73590 X-RAY EXAM OF LOWER LEG: CPT

## 2025-02-05 PROCEDURE — 96372 THER/PROPH/DIAG INJ SC/IM: CPT

## 2025-02-05 PROCEDURE — 63710000001 ONDANSETRON ODT 4 MG TABLET DISPERSIBLE: Performed by: FAMILY MEDICINE

## 2025-02-05 PROCEDURE — 25010000002 MORPHINE PER 10 MG: Performed by: FAMILY MEDICINE

## 2025-02-05 PROCEDURE — 73718 MRI LOWER EXTREMITY W/O DYE: CPT

## 2025-02-05 PROCEDURE — 25010000002 KETOROLAC TROMETHAMINE PER 15 MG: Performed by: FAMILY MEDICINE

## 2025-02-05 PROCEDURE — 99284 EMERGENCY DEPT VISIT MOD MDM: CPT

## 2025-02-05 PROCEDURE — 25010000002 DEXAMETHASONE PER 1 MG: Performed by: FAMILY MEDICINE

## 2025-02-05 RX ORDER — ONDANSETRON 4 MG/1
4 TABLET, ORALLY DISINTEGRATING ORAL ONCE
Status: COMPLETED | OUTPATIENT
Start: 2025-02-05 | End: 2025-02-05

## 2025-02-05 RX ORDER — KETOROLAC TROMETHAMINE 30 MG/ML
60 INJECTION, SOLUTION INTRAMUSCULAR; INTRAVENOUS ONCE
Status: COMPLETED | OUTPATIENT
Start: 2025-02-05 | End: 2025-02-05

## 2025-02-05 RX ORDER — DICLOFENAC SODIUM 75 MG/1
75 TABLET, DELAYED RELEASE ORAL 2 TIMES DAILY PRN
Qty: 60 TABLET | Refills: 0 | Status: SHIPPED | OUTPATIENT
Start: 2025-02-05 | End: 2025-03-07

## 2025-02-05 RX ORDER — HYDROCODONE BITARTRATE AND ACETAMINOPHEN 10; 325 MG/1; MG/1
1 TABLET ORAL ONCE
Status: COMPLETED | OUTPATIENT
Start: 2025-02-05 | End: 2025-02-05

## 2025-02-05 RX ORDER — DEXAMETHASONE SODIUM PHOSPHATE 10 MG/ML
10 INJECTION INTRAMUSCULAR; INTRAVENOUS ONCE
Status: COMPLETED | OUTPATIENT
Start: 2025-02-05 | End: 2025-02-05

## 2025-02-05 RX ORDER — HYDROCODONE BITARTRATE AND ACETAMINOPHEN 7.5; 325 MG/1; MG/1
1 TABLET ORAL EVERY 8 HOURS PRN
Qty: 12 TABLET | Refills: 0 | Status: SHIPPED | OUTPATIENT
Start: 2025-02-05 | End: 2025-02-09

## 2025-02-05 RX ADMIN — DEXAMETHASONE SODIUM PHOSPHATE 10 MG: 10 INJECTION INTRAMUSCULAR; INTRAVENOUS at 12:58

## 2025-02-05 RX ADMIN — MORPHINE SULFATE 4 MG: 4 INJECTION, SOLUTION INTRAMUSCULAR; INTRAVENOUS at 12:59

## 2025-02-05 RX ADMIN — KETOROLAC TROMETHAMINE 60 MG: 30 INJECTION, SOLUTION INTRAMUSCULAR; INTRAVENOUS at 15:48

## 2025-02-05 RX ADMIN — HYDROCODONE BITARTRATE AND ACETAMINOPHEN 1 TABLET: 10; 325 TABLET ORAL at 15:51

## 2025-02-05 RX ADMIN — ONDANSETRON 4 MG: 4 TABLET, ORALLY DISINTEGRATING ORAL at 12:58

## 2025-02-05 NOTE — Clinical Note
[facility] EMERGENCY DEPARTMENT  [address]  [facility] [address]  Phone: [phone]    Jose Pace was seen and treated in our emergency department on 2/5/2025.  He may return to work on 02/10/2025.         Thank you for choosing [facility].    George Jones Jr., MD

## 2025-02-05 NOTE — ED PROVIDER NOTES
HPI:    Patient is a 50-year-old -American man who presents to the emergency room with a sudden onset of a sharp painful pop feeling in his calf muscle.  Since that time he has been unable to bear weight on the right leg due to the pain in his calf that radiates to behind his knee.  There was no fall injury.  This has not happened before in the past.  He rates the pain 9 out of 10.    REVIEW OF SYSTEMS  CONSTITUTIONAL:  No complaints of fever, chills,or weakness  EYES:  No complaints of discharge   ENT: No complaints of sore throat or ear pain  CARDIOVASCULAR:  No complaints of chest pain, palpitations, or swelling  RESPIRATORY:  No complaints of cough or shortness of breath  GI:  No complaints of abdominal pain, nausea, vomiting, or diarrhea  MUSCULOSKELETAL: Positive for pain and swelling in the calf with sudden onset   SKIN:  No complaints of rash  NEUROLOGIC:  No complaints of headache, focal weakness, or sensory changes  ENDOCRINE:  No complaints of polyuria or polydipsia  LYMPHATIC:  No complaints of swollen glands  GENITOURINARY: No complaints of urinary frequency or hematuria        PAST MEDICAL HISTORY  Past Medical History:   Diagnosis Date    Asthma     COPD (chronic obstructive pulmonary disease)     COVID-19 virus infection 2021       FAMILY HISTORY  Family History   Problem Relation Age of Onset    Colon polyps Mother     Heart murmur Mother     Stroke Mother     Hypertension Mother     Colon cancer Neg Hx        SOCIAL HISTORY  Social History     Socioeconomic History    Marital status: Single   Tobacco Use    Smoking status: Former     Current packs/day: 0.00     Average packs/day: 0.3 packs/day for 5.0 years (1.3 ttl pk-yrs)     Types: Cigarettes     Start date:      Quit date:      Years since quittin.1     Passive exposure: Past    Smokeless tobacco: Never   Vaping Use    Vaping status: Never Used   Substance and Sexual Activity    Alcohol use: Yes     Comment: occ     "Drug use: Yes     Types: Marijuana     Comment: Occ    Sexual activity: Defer       IMMUNIZATION HISTORY  Deferred to primary care physician.    SURGICAL HISTORY  Past Surgical History:   Procedure Laterality Date    COLONOSCOPY N/A 10/24/2023    Procedure: COLONOSCOPY WITH ANESTHESIA;  Surgeon: Sanket Wong MD;  Location: [facility] ENDOSCOPY;  Service: Gastroenterology;  Laterality: N/A;  preop: screening  post op: normal   PCP:Reji Hardy,     HERNIA REPAIR         CURRENT MEDICATIONS  No current facility-administered medications for this encounter.    Current Outpatient Medications:     albuterol (PROVENTIL) (2.5 MG/3ML) 0.083% nebulizer solution, Take 2.5 mg by nebulization Every 4 (Four) Hours As Needed for Wheezing., Disp: 30 each, Rfl: 5    Albuterol-Budesonide (Airsupra) 90-80 MCG/ACT aerosol, Inhale 2 puffs 6 (Six) Times a Day., Disp: 10.7 g, Rfl: 6    ipratropium-albuterol (DUO-NEB) 0.5-2.5 mg/3 ml nebulizer, Take 3 mL by nebulization Every 4 (Four) Hours As Needed for Wheezing or Shortness of Air for up to 20 doses., Disp: 60 mL, Rfl: 0    ALLERGIES  No Known Allergies    Musculoskeletal exam    VITAL SIGNS:   /80   Pulse 68   Temp 98.2 °F (36.8 °C)   Resp 17   Ht 165.1 cm (65\")   Wt 68 kg (150 lb)   SpO2 100%   BMI 24.96 kg/m²     Constitutional: Patient is alert and in no distress.  Patient with moderate right calf discomfort.    Cardiovascular: S1-S2 regular rate and rhythm. No murmurs, rubs or gallops are noted.    Respiratory: Patient is clear to auscultation bilaterally with no wheezing or rhonchi.  Chest wall is tender.  There are no external lesions on the chest.  There is no crepitance    Abdomen: Soft, nontender. Bowel sounds are normal in all 4 quadrants. There is no rebound or guarding noted.  There is no abdominal distention or hepatosplenomegaly.    Musculoskeletal: Right calf: There is tenderness palpation of the body of the calf.  There is swelling along the calf " and the soleus muscle.  Worsening pain with dorsiflexion of the right foot.  There is no tenderness over the insertion of the Achilles at the foot.    Integumentary: No acute changes noted    Genitourinary: Patient is voiding appropriately.    Psychiatric: Normal affect and mood      RADIOLOGY/PROCEDURES        MRI Tibia Fibula Right Without Contrast   Final Result       Abnormal edema/fluid extending along the expected course of the   plantaris muscle, which could suggest a plantaris tendon injury. This is   also suggestive of a fascial injury between the medial head   gastrocnemius and soleus muscle with additional low-grade interstitial   tearing involving the inferior aspect of the medial head gastrocnemius   muscle belly.       This report was signed and finalized on 2/5/2025 3:49 PM by Oscar Qureshi.          XR Tibia Fibula 2 View Right   Final Result   1. No acute fracture of the tibia or fibula.           This report was signed and finalized on 2/5/2025 1:10 PM by Dr. Devin Howard MD.                 FUTURE APPOINTMENTS     Future Appointments   Date Time Provider Department Center   6/12/2025  3:45 PM Mike Forbes APRN [facility]   8/28/2025  4:00 PM Reji Hardy DO [facility]            COURSE & MEDICAL DECISION MAKING    Patient's partial differential diagnosis can include:    Plantaris injury, Plantaris tear,gastrocnemius injury, gastrocnemius tear, soleus injury, other musculotendinous tear or other.       MRI shows a gastrocnemius and plantaris injury.  Will place the patient on crutches to lighten the weightbearing and refer him to orthopedics.      Patient's level of risk: Moderate      CRITICAL CARE    CRITICAL CARE: No    CRITICAL CARE TIME: None      No results found for this or any previous visit (from the past 24 hours).        Also  Old charts were reviewed per [facility] EMR.  Pertinent details are summarized above.  All laboratory, radiologic, and EKG  "studies that were performed in the Emergency Department were a necessary part of the evaluation needed to exclude unstable or  emergent medical conditions.     Patient was hemodynamically and neurologically stable in the ED.   Pertinent studies were reviewed as above.     The patient received:  Medications   morphine injection 4 mg (4 mg Intramuscular Given 2/5/25 1259)   dexAMETHasone (DECADRON) injection 10 mg (10 mg Intramuscular Given 2/5/25 1258)   ondansetron ODT (ZOFRAN-ODT) disintegrating tablet 4 mg (4 mg Oral Given 2/5/25 1258)   ketorolac (TORADOL) injection 60 mg (60 mg Intramuscular Given 2/5/25 1548)   HYDROcodone-acetaminophen (NORCO)  MG per tablet 1 tablet (1 tablet Oral Given 2/5/25 1551)            ED Disposition       ED Disposition   Discharge    Condition   Stable    Comment   --                 Dragon disclaimer:  Part of this note may be an electronic transcription/translation of spoken language to printed text using the Dragon Dictation System.     I have reviewed the patient’s prescription history via a prescription monitoring program.  This information is consistent with my knowledge of the patient’s controlled substance use history.    Patient evaluated during Coronavirus Pandemic. Isolation practices followed according to [facility] policy.    FINAL IMPRESSION   Diagnosis Plan   1. Rupture of plantaris tendon, right, initial encounter  [facility].  Crutches; Right; Adult (5'2\" - 5'10\")      2. Gastrocnemius tear, right, initial encounter  [facility].  Crutches; Right; Adult (5'2\" - 5'10\")            MD Robert Araya Jr, Thomas Mark Jr., MD  02/05/25 5462    "

## 2025-02-06 ENCOUNTER — TELEPHONE (OUTPATIENT)
Age: 50
End: 2025-02-06

## 2025-02-06 ENCOUNTER — OFFICE VISIT (OUTPATIENT)
Age: 50
End: 2025-02-06

## 2025-02-06 VITALS — HEIGHT: 65 IN | BODY MASS INDEX: 23.32 KG/M2 | WEIGHT: 140 LBS

## 2025-02-06 DIAGNOSIS — S86.111A GASTROCNEMIUS STRAIN, RIGHT, INITIAL ENCOUNTER: Primary | ICD-10-CM

## 2025-02-06 RX ORDER — DICLOFENAC SODIUM 75 MG/1
75 TABLET, DELAYED RELEASE ORAL 2 TIMES DAILY PRN
COMMUNITY
Start: 2025-02-05 | End: 2025-03-08

## 2025-02-06 RX ORDER — HYDROCODONE BITARTRATE AND ACETAMINOPHEN 7.5; 325 MG/1; MG/1
1 TABLET ORAL EVERY 8 HOURS PRN
COMMUNITY
Start: 2025-02-05 | End: 2025-02-10

## 2025-02-06 ASSESSMENT — ENCOUNTER SYMPTOMS: SHORTNESS OF BREATH: 0

## 2025-02-06 NOTE — TELEPHONE ENCOUNTER
Favian Blakely   1975  [employer] 2/5  Tear in Calf Muscle per pt  DOI 2/5  Yes Xrays & MRI  WC   [phone]

## 2025-02-06 NOTE — PROGRESS NOTES
[facility]  [facility]  [address] [place] [address]  Dept: [phone]  Dept Fax: [phone]  Loc: [phone]     Favian Blakely (:  1975) is a 50 y.o. male,Established patient, here for evaluation of the following:    Chief Complaint   Patient presents with    Leg Pain     R leg/calf  DOI: 25           Subjective   Patient is a 50-year-old -American male presented to clinic with a right calf injury.  On 2025 he was at work and he missed a step.  He states he heard a pop in his calf.  He was seen at the ER and had an x-ray and an MRI at [facility].  MRI shows a plantaris injury along with fascial tearing of the medial gastroc head and the soleus.  Patient is on crutches today.  Has been taking diclofenac.  Has been using ice.  He reports it is very swollen.  He still has the ability to move his ankle but painful.  He just cannot bear weight on his foot.    Leg Pain         No Known Allergies  Past Surgical History:   Procedure Laterality Date    HERNIA REPAIR       Social History     Tobacco Use    Smoking status: Never    Smokeless tobacco: Never          Review of Systems   Constitutional:  Negative for fatigue and fever.   Respiratory:  Negative for shortness of breath.    Cardiovascular:  Negative for chest pain.   Musculoskeletal:  Positive for myalgias. Negative for arthralgias.   Skin:  Negative for rash and wound.   Neurological:  Negative for weakness.   All other systems reviewed and are negative.         Objective   Physical Exam  Constitutional:       General: He is not in acute distress.     Appearance: Normal appearance.   HENT:      Head: Normocephalic.   Pulmonary:      Effort: Pulmonary effort is normal.   Musculoskeletal:      Comments: Upon inspection of the right calf there is no erythema, ecchymosis, deformity.  There is effusion through the calf.  Tenderness with palpation on the medial

## 2025-03-20 ENCOUNTER — OFFICE VISIT (OUTPATIENT)
Age: 50
End: 2025-03-20

## 2025-03-20 VITALS — HEIGHT: 65 IN | WEIGHT: 138 LBS | BODY MASS INDEX: 22.99 KG/M2

## 2025-03-20 DIAGNOSIS — S86.111D GASTROCNEMIUS TEAR, RIGHT, SUBSEQUENT ENCOUNTER: ICD-10-CM

## 2025-03-20 DIAGNOSIS — S86.801A: Primary | ICD-10-CM

## 2025-03-20 ASSESSMENT — ENCOUNTER SYMPTOMS: SHORTNESS OF BREATH: 0

## 2025-03-20 NOTE — PROGRESS NOTES
[facility]  [facility]  [address] [facility] [place] [address]  Dept: [phone]  Dept Fax: [phone]  Loc: [phone]     Favian Blakely (:  1975) is a 50 y.o. male,Established patient, here for evaluation of the following:    Chief Complaint   Patient presents with    Follow-up     R Leg           Subjective   Patient is a 50-year-old -American male presented to clinic for a follow-up of his right calf tear.  He injured himself at work on 2025.  This is a Workmen's Compensation case.  He was last in office on 2025.  He has been doing physical therapy since that visit.  He goes 3 times a week.  He reports he is getting some better.  He is walking without crutches today.  He still has constant pain and a sharp throbbing in his calf.  He is also experiencing some muscle spasms.  He is taking his diclofenac on occasion.  He reports that for his work he will need to be able to walk up an incline, run, squat and lift things.  He reports he is not able to do that at this time.        No Known Allergies  Past Surgical History:   Procedure Laterality Date    HERNIA REPAIR       Social History     Tobacco Use    Smoking status: Never    Smokeless tobacco: Never          Review of Systems   Constitutional:  Negative for fatigue and fever.   Respiratory:  Negative for shortness of breath.    Cardiovascular:  Negative for chest pain.   Musculoskeletal:  Positive for arthralgias and myalgias.   Skin:  Negative for rash and wound.   Neurological:  Negative for weakness.   All other systems reviewed and are negative.         Objective   Physical Exam  Constitutional:       General: He is not in acute distress.     Appearance: Normal appearance.   HENT:      Head: Normocephalic.   Pulmonary:      Effort: Pulmonary effort is normal.   Musculoskeletal:      Comments: Upon inspection of the right calf there is no erythema, ecchymosis,

## 2025-03-26 ENCOUNTER — HOSPITAL ENCOUNTER (EMERGENCY)
Age: 50
Discharge: HOME | End: 2025-03-26
Payer: OTHER MISCELLANEOUS

## 2025-03-26 ENCOUNTER — APPOINTMENT (OUTPATIENT)
Dept: GENERAL RADIOLOGY | Age: 50
End: 2025-03-26
Payer: OTHER MISCELLANEOUS

## 2025-03-26 ENCOUNTER — APPOINTMENT (OUTPATIENT)
Dept: CT IMAGING | Age: 50
End: 2025-03-26
Payer: OTHER MISCELLANEOUS

## 2025-03-26 VITALS
DIASTOLIC BLOOD PRESSURE: 102 MMHG | HEART RATE: 64 BPM | OXYGEN SATURATION: 99 % | SYSTOLIC BLOOD PRESSURE: 124 MMHG | RESPIRATION RATE: 18 BRPM | TEMPERATURE: 97.2 F

## 2025-03-26 DIAGNOSIS — M47.812 CERVICAL ARTHRITIS: Primary | ICD-10-CM
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
## 2025-03-26 DIAGNOSIS — S46.912A STRAIN OF LEFT SHOULDER, INITIAL ENCOUNTER: ICD-10-CM

## 2025-03-26 DIAGNOSIS — S16.1XXA STRAIN OF NECK MUSCLE, INITIAL ENCOUNTER: ICD-10-CM

## 2025-03-26 PROCEDURE — 72125 CT NECK SPINE W/O DYE: CPT

## 2025-03-26 PROCEDURE — 72040 X-RAY EXAM NECK SPINE 2-3 VW: CPT

## 2025-03-26 PROCEDURE — 6370000000 HC RX 637 (ALT 250 FOR IP): Performed by: NURSE PRACTITIONER

## 2025-03-26 PROCEDURE — 99284 EMERGENCY DEPT VISIT MOD MDM: CPT

## 2025-03-26 PROCEDURE — 73030 X-RAY EXAM OF SHOULDER: CPT

## 2025-03-26 RX ORDER — TRAMADOL HYDROCHLORIDE 50 MG/1
50 TABLET ORAL EVERY 6 HOURS PRN
Qty: 12 TABLET | Refills: 0 | Status: SHIPPED | OUTPATIENT
Start: 2025-03-26 | End: 2025-03-29

## 2025-03-26 RX ORDER — OXYCODONE AND ACETAMINOPHEN 5; 325 MG/1; MG/1
1 TABLET ORAL ONCE
Refills: 0 | Status: COMPLETED | OUTPATIENT
Start: 2025-03-26 | End: 2025-03-26

## 2025-03-26 RX ADMIN — OXYCODONE HYDROCHLORIDE AND ACETAMINOPHEN 1 TABLET: 5; 325 TABLET ORAL at 21:34

## 2025-03-26 ASSESSMENT — PAIN SCALES - GENERAL: PAINLEVEL_OUTOF10: 8

## 2025-03-26 ASSESSMENT — ENCOUNTER SYMPTOMS
COLOR CHANGE: 0
RESPIRATORY NEGATIVE: 1

## 2025-03-26 ASSESSMENT — PAIN - FUNCTIONAL ASSESSMENT: PAIN_FUNCTIONAL_ASSESSMENT: 0-10
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
## 2025-03-27 NOTE — DISCHARGE INSTRUCTIONS
Pain medication as needed  Follow-up with your doctor if symptoms persist.  Be sure you are drinking plenty of water  Return to ER for any new, worsening, or change in condition.

## 2025-04-17 ENCOUNTER — OFFICE VISIT (OUTPATIENT)
Age: 50
End: 2025-04-17

## 2025-04-17 VITALS — HEIGHT: 65 IN | WEIGHT: 143 LBS | BODY MASS INDEX: 23.82 KG/M2

## 2025-04-17 DIAGNOSIS — S86.111D GASTROCNEMIUS TEAR, RIGHT, SUBSEQUENT ENCOUNTER: Primary | ICD-10-CM

## 2025-04-17 ASSESSMENT — ENCOUNTER SYMPTOMS: SHORTNESS OF BREATH: 0

## 2025-04-17 NOTE — PROGRESS NOTES
[facility]  [facility]  [address] [facility] [place] [address]  Dept: [phone]  Dept Fax: [phone]  Loc: [phone]     Favian Blakely (:  1975) is a 50 y.o. male,Established patient, here for evaluation of the following:    Chief Complaint   Patient presents with    Follow-up     RLE pain  DOI: 25           Subjective   Patient is a 50-year-old -American male presents to clinic for follow-up of his right calf tear.  He injured himself at work on 2025.  This is a Workmen's Compensation case.  He had missed a step and strained his calf.  He has been doing physical therapy recently.  He is doing a lot better.  He thinks he is able to return to work and perform full duty.        No Known Allergies  Past Surgical History:   Procedure Laterality Date    HERNIA REPAIR       Social History     Tobacco Use    Smoking status: Never    Smokeless tobacco: Never          Review of Systems   Constitutional:  Negative for fatigue and fever.   Respiratory:  Negative for shortness of breath.    Cardiovascular:  Negative for chest pain.   Musculoskeletal:  Positive for myalgias. Negative for arthralgias.   Skin:  Negative for rash and wound.   Neurological:  Negative for weakness.   All other systems reviewed and are negative.         Objective   Physical Exam  Constitutional:       General: He is not in acute distress.     Appearance: Normal appearance.   HENT:      Head: Normocephalic.   Pulmonary:      Effort: Pulmonary effort is normal.   Musculoskeletal:      Comments: Upon inspection of the right calf there is no erythema, ecchymosis, deformity.  There is no effusion through the calf.  No tenderness with palpation on the medial aspect of the gastrocnemius.  There is a palpable knot on the medial gastrocnemius head.  Such as a muscle spasm.  Patient can perform active flexion and dorsiflexion of the foot.  Plantarflexion does not

## 2025-06-19 ENCOUNTER — TELEPHONE (OUTPATIENT)
Dept: PULMONOLOGY | Facility: CLINIC | Age: 50
End: 2025-06-19
Payer: OTHER MISCELLANEOUS

## 2025-06-19 NOTE — TELEPHONE ENCOUNTER
Called patient to reschedule their no show appointment that was originally scheduled on 06/12/2025 .      Patient did not want to reschedule at this time.  [facility] No Show Policy was discussed and understood per patient.     Patient states he lost his insurance and can not come financially.

## 2025-08-07 DIAGNOSIS — J45.909 ASTHMA, UNSPECIFIED ASTHMA SEVERITY, UNSPECIFIED WHETHER COMPLICATED, UNSPECIFIED WHETHER PERSISTENT: Chronic | ICD-10-CM

## 2025-08-07 RX ORDER — ALBUTEROL SULFATE AND BUDESONIDE 90; 80 UG/1; UG/1
AEROSOL, METERED RESPIRATORY (INHALATION)
Refills: 5 | OUTPATIENT
Start: 2025-08-07

## 2025-08-29 ENCOUNTER — TELEPHONE (OUTPATIENT)
Dept: INTERNAL MEDICINE | Facility: CLINIC | Age: 50
End: 2025-08-29
Payer: OTHER MISCELLANEOUS

## (undated) DEVICE — Device: Brand: DEFENDO AIR/WATER/SUCTION AND BIOPSY VALVE

## (undated) DEVICE — MASK,OXYGEN,MED CONC,ADLT,7' TUB, UC: Brand: PENDING

## (undated) DEVICE — SENSR O2 OXIMAX FNGR A/ 18IN NONSTR

## (undated) DEVICE — THE CHANNEL CLEANING BRUSH IS A NYLON FLEXI BRUSH ATTACHED TO A FLEXIBLE PLASTIC SHEATH DESIGNED TO SAFELY REMOVE DEBRIS FROM FLEXIBLE ENDOSCOPES.

## (undated) DEVICE — YANKAUER,BULB TIP WITH VENT: Brand: ARGYLE

## (undated) DEVICE — CUFF,BP,DISP,1 TUBE,ADULT,HP: Brand: MEDLINE